# Patient Record
Sex: FEMALE | Race: WHITE | NOT HISPANIC OR LATINO | Employment: OTHER | URBAN - NONMETROPOLITAN AREA
[De-identification: names, ages, dates, MRNs, and addresses within clinical notes are randomized per-mention and may not be internally consistent; named-entity substitution may affect disease eponyms.]

---

## 2017-04-19 ENCOUNTER — TRANSCRIBE ORDERS (OUTPATIENT)
Dept: ADMINISTRATIVE | Facility: HOSPITAL | Age: 72
End: 2017-04-19

## 2017-04-19 DIAGNOSIS — J32.0 CHRONIC MAXILLARY SINUSITIS: Primary | ICD-10-CM

## 2017-04-19 DIAGNOSIS — J30.1 ALLERGIC RHINITIS DUE TO POLLEN, UNSPECIFIED RHINITIS SEASONALITY: ICD-10-CM

## 2017-04-24 ENCOUNTER — HOSPITAL ENCOUNTER (OUTPATIENT)
Dept: CT IMAGING | Facility: HOSPITAL | Age: 72
Discharge: HOME | End: 2017-04-24
Attending: OTOLARYNGOLOGY | Admitting: OTOLARYNGOLOGY

## 2017-04-24 DIAGNOSIS — J30.1 ALLERGIC RHINITIS DUE TO POLLEN, UNSPECIFIED RHINITIS SEASONALITY: ICD-10-CM

## 2017-04-24 DIAGNOSIS — J32.0 CHRONIC MAXILLARY SINUSITIS: ICD-10-CM

## 2017-04-24 PROCEDURE — 70486 CT MAXILLOFACIAL W/O DYE: CPT | Performed by: RADIOLOGY

## 2017-04-24 PROCEDURE — 70486 CT MAXILLOFACIAL W/O DYE: CPT

## 2018-02-01 ENCOUNTER — TRANSCRIBE ORDERS (OUTPATIENT)
Dept: ADMINISTRATIVE | Facility: HOSPITAL | Age: 73
End: 2018-02-01

## 2018-02-01 DIAGNOSIS — M54.5 LOW BACK PAIN, UNSPECIFIED BACK PAIN LATERALITY, UNSPECIFIED CHRONICITY, WITH SCIATICA PRESENCE UNSPECIFIED: Primary | ICD-10-CM

## 2018-02-16 ENCOUNTER — APPOINTMENT (OUTPATIENT)
Dept: MRI IMAGING | Facility: HOSPITAL | Age: 73
End: 2018-02-16

## 2018-02-22 ENCOUNTER — HOSPITAL ENCOUNTER (OUTPATIENT)
Dept: MRI IMAGING | Facility: HOSPITAL | Age: 73
Discharge: HOME | End: 2018-02-22
Admitting: PHYSICIAN ASSISTANT

## 2018-02-22 DIAGNOSIS — M54.5 LOW BACK PAIN, UNSPECIFIED BACK PAIN LATERALITY, UNSPECIFIED CHRONICITY, WITH SCIATICA PRESENCE UNSPECIFIED: ICD-10-CM

## 2018-02-22 PROCEDURE — 72148 MRI LUMBAR SPINE W/O DYE: CPT

## 2018-02-22 PROCEDURE — 72148 MRI LUMBAR SPINE W/O DYE: CPT | Performed by: RADIOLOGY

## 2018-05-17 ENCOUNTER — OFFICE VISIT (OUTPATIENT)
Dept: NEUROSURGERY | Facility: CLINIC | Age: 73
End: 2018-05-17

## 2018-05-17 VITALS
HEART RATE: 82 BPM | TEMPERATURE: 98.5 F | HEIGHT: 66 IN | BODY MASS INDEX: 23.78 KG/M2 | DIASTOLIC BLOOD PRESSURE: 61 MMHG | OXYGEN SATURATION: 98 % | SYSTOLIC BLOOD PRESSURE: 87 MMHG | RESPIRATION RATE: 16 BRPM | WEIGHT: 148 LBS

## 2018-05-17 DIAGNOSIS — M43.16 SPONDYLOLISTHESIS OF LUMBAR REGION: ICD-10-CM

## 2018-05-17 DIAGNOSIS — M50.30 DEGENERATIVE DISC DISEASE, CERVICAL: ICD-10-CM

## 2018-05-17 DIAGNOSIS — M51.36 DEGENERATIVE DISC DISEASE, LUMBAR: Primary | ICD-10-CM

## 2018-05-17 PROCEDURE — 99203 OFFICE O/P NEW LOW 30 MIN: CPT | Performed by: NEUROLOGICAL SURGERY

## 2018-05-17 RX ORDER — LEVOTHYROXINE SODIUM 0.07 MG/1
75 TABLET ORAL DAILY
COMMUNITY

## 2018-05-17 RX ORDER — RANITIDINE 150 MG/1
150 TABLET ORAL 2 TIMES DAILY
COMMUNITY

## 2018-05-17 RX ORDER — METOPROLOL SUCCINATE 25 MG/1
25 TABLET, EXTENDED RELEASE ORAL DAILY
COMMUNITY

## 2018-05-17 RX ORDER — METHOCARBAMOL 750 MG/1
750 TABLET, FILM COATED ORAL NIGHTLY
Qty: 30 TABLET | Refills: 0 | Status: SHIPPED | OUTPATIENT
Start: 2018-05-17 | End: 2018-07-26 | Stop reason: SDUPTHER

## 2018-05-17 RX ORDER — NABUMETONE 750 MG/1
750 TABLET, FILM COATED ORAL 2 TIMES DAILY
Qty: 30 TABLET | Refills: 0 | Status: SHIPPED | OUTPATIENT
Start: 2018-05-17 | End: 2018-07-26 | Stop reason: SDUPTHER

## 2018-05-17 RX ORDER — DULOXETIN HYDROCHLORIDE 60 MG/1
60 CAPSULE, DELAYED RELEASE ORAL DAILY
COMMUNITY

## 2018-05-17 RX ORDER — LEVOCETIRIZINE DIHYDROCHLORIDE 5 MG/1
5 TABLET, FILM COATED ORAL EVERY EVENING
COMMUNITY

## 2018-05-17 RX ORDER — TRAZODONE HYDROCHLORIDE 100 MG/1
100 TABLET ORAL NIGHTLY
COMMUNITY

## 2018-05-17 RX ORDER — MONTELUKAST SODIUM 10 MG/1
10 TABLET ORAL NIGHTLY
COMMUNITY

## 2018-05-17 NOTE — PROGRESS NOTES
Conchita Ross  1945  [id number]      Chief Complaint   Patient presents with   • Back Pain   • Leg Pain       HISTORY OF PRESENT ILLNESS:   This is a 72-year-old female who I saw several years ago with symptoms associated with an acquired spondylolisthesis L4-L5.  She was forwarded to physical therapy and did actually well until recently which time she is had a reoccurrence of her symptoms which occur sporadically yet when they do are quite painful.  She has pain in her back which radiates into both lower extremities.  She has not been to physical therapy recently, however.    Lumbar MRI was performed and she is referred for neurosurgical consideration.    Past Medical History:   Diagnosis Date   • Arthritis    • CHF (congestive heart failure)    • Diabetes mellitus    • Headache    • Hypertension    • Low back pain        Past Surgical History:   Procedure Laterality Date   • CHOLECYSTECTOMY  2012   • HYSTERECTOMY  2003   • SINUS SURGERY  2006       Family History   Problem Relation Age of Onset   • COPD Mother    • Hypertension Father    • Heart disease Father        Social History     Social History   • Marital status:      Spouse name: N/A   • Number of children: N/A   • Years of education: N/A     Occupational History   • Not on file.     Social History Main Topics   • Smoking status: Current Every Day Smoker     Packs/day: 0.25   • Smokeless tobacco: Never Used   • Alcohol use No   • Drug use: No   • Sexual activity: Defer     Other Topics Concern   • Not on file     Social History Narrative   • No narrative on file       No Known Allergies      Current Outpatient Prescriptions:   •  DULoxetine (CYMBALTA) 60 MG capsule, Take 60 mg by mouth Daily., Disp: , Rfl:   •  fluticasone (VERAMYST) 27.5 MCG/SPRAY nasal spray, 2 sprays into each nostril Daily., Disp: , Rfl:   •  levocetirizine (XYZAL) 5 MG tablet, Take 5 mg by mouth Every Evening., Disp: , Rfl:   •  levothyroxine (SYNTHROID, LEVOTHROID) 75  "MCG tablet, Take 75 mcg by mouth Daily., Disp: , Rfl:   •  metFORMIN (GLUCOPHAGE) 500 MG tablet, Take 500 mg by mouth 2 (Two) Times a Day With Meals., Disp: , Rfl:   •  metoprolol succinate XL (TOPROL-XL) 25 MG 24 hr tablet, Take 25 mg by mouth Daily., Disp: , Rfl:   •  montelukast (SINGULAIR) 10 MG tablet, Take 10 mg by mouth Every Night., Disp: , Rfl:   •  raNITIdine (ZANTAC) 150 MG tablet, Take 150 mg by mouth 2 (Two) Times a Day., Disp: , Rfl:   •  traZODone (DESYREL) 100 MG tablet, Take 100 mg by mouth Every Night., Disp: , Rfl:     Review of Systems   Constitutional: Positive for fatigue and unexpected weight change.   HENT: Positive for congestion, facial swelling, hearing loss, sinus pressure, sneezing, tinnitus and voice change.    Eyes: Positive for photophobia.   Respiratory: Positive for shortness of breath and wheezing.    Cardiovascular: Positive for palpitations and leg swelling.   Gastrointestinal: Positive for nausea.   Endocrine: Positive for cold intolerance, heat intolerance, polydipsia and polyuria.   Genitourinary: Positive for flank pain, frequency and pelvic pain.   Musculoskeletal: Positive for arthralgias, back pain, gait problem, myalgias, neck pain and neck stiffness.   Allergic/Immunologic: Positive for environmental allergies.   Neurological: Positive for dizziness, weakness, light-headedness, numbness and headaches.   Hematological: Bruises/bleeds easily.   Psychiatric/Behavioral: Positive for sleep disturbance.   All other systems reviewed and are negative.      Vitals:    05/17/18 0823   BP: (!) 87/61   BP Location: Left arm   Patient Position: Sitting   Pulse: 82   Resp: 16   Temp: 98.5 °F (36.9 °C)   SpO2: 98%   Weight: 67.1 kg (148 lb)   Height: 167.6 cm (66\")       Neurological Examination:  Mental status/speech: The patient is alert and oriented.  Speech is clear without aphysia or dysarthria.  No overt cognitive deficits.    Cranial nerve examination:    Olfaction: Smell is " intact.  Vision: Vision is intact without visual field abnormalities.  Funduscopic examination is normal.  No pupillary irregularity.  Ocular motor examination: The extraocular muscles are intact.  There is no diplopia.  The pupil is round and reactive to both light and accommodation.  There is no nystagmus.  Facial movement/sensation: There is no facial weakness.  Sensation is intact in the first, second, and third divisions of the trigeminal nerve.  The corneal reflex is intact.  Auditory: Hearing is intact to finger rub bilaterally.  Cranial nerves IX, X, XI, XII: Phonation is normal.  No dysphagia.  Tongue is protruded in the midline without atrophy.  The gag reflex is intact.  Shoulder shrug is normal.    Musculoligamentous ligamentous examination: She demonstrates limitation of the range of motion of the lumbar spine without weakness, sensory loss or reflex asymmetry.  Her gait is normal.  There is no Babinski, Christi or clonus.  There is no straight leg raising positive.  Her gait is normal.             Medical Decision Making:     Diagnostic Data Set:  The lumbar MRI data set shows an acquired spondylolisthesis at L4-L5 of grade 1.      Assessment:  Grade 1 spondylolisthesis L4-L5          Recommendations:  I would hope we can manage this conservatively with physical therapy.  I've also provided a prescription of Relafen 750 mg twice a day and Robaxin 750 at night.  She will return to see me in 4 weeks.  At that time, if she is not better PLIF at L4-L5 would be warranted.        I greatly appreciate the opportunity to see and evaluate this individual.  If you have questions or concerns regarding issues that I may have overlooked please call me at any time: [phone].  Jabier Mace M.D.  [facility]  [address] [place] [address].  Formerly [facility]  [id number]

## 2018-07-26 DIAGNOSIS — M50.30 DEGENERATIVE DISC DISEASE, CERVICAL: ICD-10-CM

## 2018-07-26 DIAGNOSIS — M43.16 SPONDYLOLISTHESIS OF LUMBAR REGION: ICD-10-CM

## 2018-07-26 DIAGNOSIS — M51.36 DEGENERATIVE DISC DISEASE, LUMBAR: ICD-10-CM

## 2018-07-26 RX ORDER — METHOCARBAMOL 750 MG/1
TABLET, FILM COATED ORAL
Qty: 30 TABLET | Refills: 0 | Status: SHIPPED | OUTPATIENT
Start: 2018-07-26

## 2018-07-26 RX ORDER — NABUMETONE 750 MG/1
TABLET, FILM COATED ORAL
Qty: 30 TABLET | Refills: 0 | Status: SHIPPED | OUTPATIENT
Start: 2018-07-26 | End: 2018-08-02 | Stop reason: SDUPTHER

## 2018-07-26 NOTE — TELEPHONE ENCOUNTER
Provider:  Rodri  Pharmacy: [facility]  Last visit:   05/17/18  Next visit: 08/02/18    Reason for call:       Automated refill request from patient's pharmacy

## 2018-08-02 ENCOUNTER — OFFICE VISIT (OUTPATIENT)
Dept: NEUROSURGERY | Facility: CLINIC | Age: 73
End: 2018-08-02

## 2018-08-02 VITALS
OXYGEN SATURATION: 100 % | WEIGHT: 150 LBS | HEART RATE: 72 BPM | RESPIRATION RATE: 18 BRPM | HEIGHT: 66 IN | DIASTOLIC BLOOD PRESSURE: 62 MMHG | SYSTOLIC BLOOD PRESSURE: 89 MMHG | BODY MASS INDEX: 24.11 KG/M2 | TEMPERATURE: 98.6 F

## 2018-08-02 DIAGNOSIS — M51.36 DEGENERATIVE DISC DISEASE, LUMBAR: Primary | ICD-10-CM

## 2018-08-02 DIAGNOSIS — M50.30 DEGENERATIVE DISC DISEASE, CERVICAL: ICD-10-CM

## 2018-08-02 DIAGNOSIS — M43.16 SPONDYLOLISTHESIS OF LUMBAR REGION: ICD-10-CM

## 2018-08-02 PROCEDURE — 99213 OFFICE O/P EST LOW 20 MIN: CPT | Performed by: NEUROLOGICAL SURGERY

## 2018-08-02 RX ORDER — NABUMETONE 750 MG/1
750 TABLET, FILM COATED ORAL 2 TIMES DAILY
Qty: 60 TABLET | Refills: 2 | Status: SHIPPED | OUTPATIENT
Start: 2018-08-02

## 2018-08-02 NOTE — PATIENT INSTRUCTIONS
call Dr. Mace on a Monday or Tuesday with an update.   Ask for  Tiffanie  and leave a message for  Dr. Mace.  He will call you back at the end of the day as soon as he can.     [phone]    Call one month

## 2018-08-02 NOTE — PROGRESS NOTES
"Conchita Ross  1945  [id number]                       CURRENT WORKING DIAGNOSIS:  Acquired spondylolisthesis L4-L5          MEDICAL HISTORY SINCE LAST ENCOUNTER:  She has shown some improvement since her last encounter yet not completely asymptomatic.  Physical therapy and her medications have been of benefit.  She has less claudication yet still has back pain.  She continues to have \"good and bad days\".  She is seen for follow-up.            Past Medical History:   Diagnosis Date   • Arthritis    • CHF (congestive heart failure) (CMS/HCC)    • Diabetes mellitus (CMS/HCC)    • Headache    • Hypertension    • Low back pain               Past Surgical History:   Procedure Laterality Date   • CHOLECYSTECTOMY  2012   • HYSTERECTOMY  2003   • SINUS SURGERY  2006              Family History   Problem Relation Age of Onset   • COPD Mother    • Hypertension Father    • Heart disease Father               Social History     Social History   • Marital status:      Spouse name: N/A   • Number of children: N/A   • Years of education: N/A     Occupational History   • Not on file.     Social History Main Topics   • Smoking status: Current Every Day Smoker     Packs/day: 0.25   • Smokeless tobacco: Never Used   • Alcohol use No   • Drug use: No   • Sexual activity: Defer     Other Topics Concern   • Not on file     Social History Narrative   • No narrative on file            No Known Allergies           Current Outpatient Prescriptions:   •  DULoxetine (CYMBALTA) 60 MG capsule, Take 60 mg by mouth Daily., Disp: , Rfl:   •  fluticasone (VERAMYST) 27.5 MCG/SPRAY nasal spray, 2 sprays into each nostril Daily., Disp: , Rfl:   •  levocetirizine (XYZAL) 5 MG tablet, Take 5 mg by mouth Every Evening., Disp: , Rfl:   •  levothyroxine (SYNTHROID, LEVOTHROID) 75 MCG tablet, Take 75 mcg by mouth Daily., Disp: , Rfl:   •  metFORMIN (GLUCOPHAGE) 500 MG tablet, Take 500 mg by mouth 2 (Two) Times a Day With Meals., Disp: , Rfl:   •  " "methocarbamol (ROBAXIN) 750 MG tablet, TAKE ONE TABLET BY MOUTH EVERY NIGHT, Disp: 30 tablet, Rfl: 0  •  metoprolol succinate XL (TOPROL-XL) 25 MG 24 hr tablet, Take 25 mg by mouth Daily., Disp: , Rfl:   •  montelukast (SINGULAIR) 10 MG tablet, Take 10 mg by mouth Every Night., Disp: , Rfl:   •  nabumetone (RELAFEN) 750 MG tablet, TAKE ONE TABLET BY MOUTH TWO TIMES A DAY, Disp: 30 tablet, Rfl: 0  •  raNITIdine (ZANTAC) 150 MG tablet, Take 150 mg by mouth 2 (Two) Times a Day., Disp: , Rfl:   •  traZODone (DESYREL) 100 MG tablet, Take 100 mg by mouth Every Night., Disp: , Rfl:          Review of Systems   HENT: Positive for congestion, hearing loss, sinus pressure and tinnitus.    Eyes: Positive for photophobia.   Respiratory: Positive for wheezing.    Cardiovascular: Positive for palpitations.   Endocrine: Positive for cold intolerance, heat intolerance, polydipsia and polyuria.   Genitourinary: Positive for frequency.   Musculoskeletal: Positive for arthralgias, back pain, gait problem, myalgias, neck pain and neck stiffness.   Allergic/Immunologic: Positive for environmental allergies.   Neurological: Positive for dizziness, weakness, light-headedness and numbness.   Hematological: Bruises/bleeds easily.   Psychiatric/Behavioral: Positive for sleep disturbance.   All other systems reviewed and are negative.              Vitals:    08/02/18 1212   BP: (!) 89/62   BP Location: Left arm   Patient Position: Sitting   Pulse: 72   Resp: 18   Temp: 98.6 °F (37 °C)   SpO2: 100%   Weight: 68 kg (150 lb)   Height: 167.6 cm (66\")               EXAMINATION: She has limitation in the range of motion of lumbar spine.  However, straight leg raising, [place] and flip test are negative.  I find no evidence of weakness, sensory loss or reflex asymmetry.  Her gait is normal.            MEDICAL DECISION MAKING: She states that she is better and wishes to defer any surgical intervention.           ASSESSMENT/DISPOSITION: I have renewed " "her medicines of Relafen 750 mg twice a day and will ask her to call me in 1 month.  His long as she is doing \"okay\" surgical intervention is not a consideration.  PLIF should be reserved until such time as she is \"ready\".  I will keep you informed.              I APPRECIATE THE OPPORTUNITY OF THIS REFERRAL. PLEASE CALL IF ANY       QUESTIONS [phone]    "

## 2018-12-27 DIAGNOSIS — R06.02 SOB (SHORTNESS OF BREATH): Primary | ICD-10-CM

## 2019-06-12 ENCOUNTER — OFFICE VISIT (OUTPATIENT)
Dept: PULMONOLOGY | Facility: CLINIC | Age: 74
End: 2019-06-12

## 2019-06-12 VITALS
HEART RATE: 76 BPM | DIASTOLIC BLOOD PRESSURE: 54 MMHG | HEIGHT: 65 IN | WEIGHT: 150 LBS | TEMPERATURE: 97.9 F | SYSTOLIC BLOOD PRESSURE: 115 MMHG | BODY MASS INDEX: 24.99 KG/M2 | OXYGEN SATURATION: 95 %

## 2019-06-12 DIAGNOSIS — G47.33 OSA (OBSTRUCTIVE SLEEP APNEA): ICD-10-CM

## 2019-06-12 DIAGNOSIS — R06.02 SOB (SHORTNESS OF BREATH) ON EXERTION: Primary | ICD-10-CM

## 2019-06-12 DIAGNOSIS — F17.200 CURRENT SMOKER: ICD-10-CM

## 2019-06-12 DIAGNOSIS — G47.34 NOCTURNAL OXYGEN DESATURATION: ICD-10-CM

## 2019-06-12 DIAGNOSIS — J43.2 CENTRILOBULAR EMPHYSEMA (HCC): ICD-10-CM

## 2019-06-12 PROCEDURE — 94664 DEMO&/EVAL PT USE INHALER: CPT | Performed by: INTERNAL MEDICINE

## 2019-06-12 PROCEDURE — 99407 BEHAV CHNG SMOKING > 10 MIN: CPT | Performed by: INTERNAL MEDICINE

## 2019-06-12 PROCEDURE — 99204 OFFICE O/P NEW MOD 45 MIN: CPT | Performed by: INTERNAL MEDICINE

## 2019-06-12 PROCEDURE — 94618 PULMONARY STRESS TESTING: CPT | Performed by: INTERNAL MEDICINE

## 2019-06-12 RX ORDER — GLIMEPIRIDE 1 MG/1
1 TABLET ORAL
COMMUNITY

## 2019-06-12 NOTE — PROGRESS NOTES
Subjective    Conchita Ross presents for the following COPD  .    History of Present Illness   Jael is a 73-year-old female with a past medical history of arthritis, congestive heart failure, diabetes, hypertension and low back pain.  She presents to pulmonary outpatient clinic for the evaluation and management of gradual worsening of shortness of breath from last 3 months.  She started feeling shortness of breath from last 3 months.  Her shortness of breath started gradually.  Symptoms of shortness of breath are intermittent in nature.  Shortness of breath is not associated with chest pain.  Shortness of breath is associated with intermittent wheezing and productive cough from last 2 months.  She denies any subjective fever or chills.  Symptoms of shortness of breath increases with activity and decreases on rest.  She denies any discomfort when laying down flat.  She denies any severe attack of shortness of breath and coughing at nighttime.  She denies any night sweats or weight loss.  She denies any history of blood clot in the leg or in the lung.  Her cough is productive.  She produces 3 to 4 tablespoon of sputum over 24 hours.  Sputum color is white and has mucoid consistency.  Patient denies any blood in the sputum.  Wheezing happens mostly during the nighttime.  She is currently an active smoker.  She is currently on Combivent inhaler and Brio Ellipta inhaler.  She does complain of morning headache and excessive daytime sleepiness with unrefreshed sleep.  She was told by family member that she snores while sleeping.    Review of system  General ROS: negative for - chills, fatigue, fever, malaise, night sweats.   Psychological ROS: negative for - anxiety, behavioral disorder, depression or memory difficulties  Ophthalmic ROS: negative for - blurry vision, decreased vision, double vision or dry eyes  ENT ROS: negative for - epistaxis, hearing change or sneezing  Allergy and Immunology ROS: negative for -  hives, itchy/watery eyes or nasal congestion  Hematological and Lymphatic ROS: negative for - bleeding problems, blood clots, blood transfusions, jaundice or night sweats  Endocrine ROS: negative for - malaise/lethargy, mood swings, polydipsia/polyuria or temperature intolerance  Respiratory ROS: positive for shortness of breath on exertion.  positive for cough and wheezing  negative for - orthopnea, pleuritic pain or sputum changes  Cardiovascular ROS: no chest pain.  Positive for dyspnea on exertion  Gastrointestinal ROS: no abdominal pain, change in bowel habits, or black or bloody stools  Musculoskeletal ROS: negative for - joint pain, joint stiffness or joint swelling  Neurological ROS: no TIA or stroke symptoms  Dermatological ROS: negative for acne, dry skin and eczema      Past Medical History:  Past Medical History:   Diagnosis Date   • Arthritis    • CHF (congestive heart failure) (CMS/[facility])    • Diabetes mellitus (CMS/[facility])    • Headache    • Hypertension    • Low back pain        Family History:  Family History   Problem Relation Age of Onset   • COPD Mother    • Hypertension Father    • Heart disease Father        Social History:  Social History     Tobacco Use   • Smoking status: Current Every Day Smoker     Packs/day: 0.25   • Smokeless tobacco: Never Used   Substance Use Topics   • Alcohol use: No       Current Medications:  Current Outpatient Medications   Medication Sig Dispense Refill   • DULoxetine (CYMBALTA) 60 MG capsule Take 60 mg by mouth Daily.     • fluticasone (VERAMYST) 27.5 MCG/SPRAY nasal spray 2 sprays into each nostril Daily.     • Fluticasone Furoate-Vilanterol (BREO ELLIPTA) 100-25 MCG/INH inhaler Inhale 1 puff Daily.     • glimepiride (AMARYL) 1 MG tablet Take 1 mg by mouth Every Morning Before Breakfast.     • ipratropium-albuterol (COMBIVENT RESPIMAT)  MCG/ACT inhaler Inhale 1 puff 4 (Four) Times a Day As Needed for Wheezing.     • levocetirizine (XYZAL) 5 MG tablet Take 5 mg  "by mouth Every Evening.     • levothyroxine (SYNTHROID, LEVOTHROID) 75 MCG tablet Take 75 mcg by mouth Daily.     • metFORMIN (GLUCOPHAGE) 500 MG tablet Take 500 mg by mouth 2 (Two) Times a Day With Meals.     • metoprolol succinate XL (TOPROL-XL) 25 MG 24 hr tablet Take 25 mg by mouth Daily.     • montelukast (SINGULAIR) 10 MG tablet Take 10 mg by mouth Every Night.     • raNITIdine (ZANTAC) 150 MG tablet Take 150 mg by mouth 2 (Two) Times a Day.     • traZODone (DESYREL) 100 MG tablet Take 100 mg by mouth Every Night.     • methocarbamol (ROBAXIN) 750 MG tablet TAKE ONE TABLET BY MOUTH EVERY NIGHT 30 tablet 0   • nabumetone (RELAFEN) 750 MG tablet Take 1 tablet by mouth 2 (Two) Times a Day. 60 tablet 2   • tiotropium (SPIRIVA) 18 MCG per inhalation capsule Place 1 capsule into inhaler and inhale Daily. 30 capsule 11     No current facility-administered medications for this visit.        Allergies:  No Known Allergies    Vitals:  /54   Pulse 76   Temp 97.9 °F (36.6 °C) (Oral)   Ht 165.1 cm (65\")   Wt 68 kg (150 lb)   SpO2 95%   BMI 24.96 kg/m²       Objective   Physical Exam:  Constitutional:  oriented to person, place, and time. appears well-developed and well-nourished. No distress.   HENT:   Head: Normocephalic and atraumatic.   Right Ear: External ear normal.   Left Ear: External ear normal.   Nose: Nose normal.   Eyes: Pupils are equal, round, and reactive to light. Conjunctivae and EOM are normal. Right eye exhibits no discharge. Left eye exhibits no discharge. No scleral icterus.   Neck: Normal range of motion. Neck supple. No thyromegaly present.   Cardiovascular: Normal rate, regular rhythm, normal heart sounds and intact distal pulses.  Exam reveals no friction rub.    No murmur heard.  Pulmonary/Chest: No stridor.   Bilateral air entry equal.  Decreased breath sounds in left and right posterior lower lung zones.  No rhonchi heard.  Wheezing absent.  No crackles appreciated.    Abdominal: " Soft. Bowel sounds are normal.exhibits no distension. There is no tenderness.   Musculoskeletal: Normal range of motion. exhibits no deformity.   no lower extremity edema bilateral.   Neurological: alert and oriented to person, place, and time. No cranial nerve deficit. Coordination normal.   Skin: Skin is warm and dry. Capillary refill takes less than 2 seconds. not diaphoretic. No erythema.   Psychiatric:   normal mood and affect.   behavior is normal.         I have reviewed the past medical history, past surgical history, family history and social history of the patient.     Labs:      Imaging        Assessment/Plan    SOB (shortness of breath) on exertion  - ordered CXR   - ordered PFT   - patient underwent walk oximetry while in clinic.         Nocturnal oxygen desaturation    - continue supplemental oxygen while sleeping to avoid nocturnal desaturation.       Current smoker      - Smoking cessation counseling performed    IGOR (obstructive sleep apnea)   - ordered sleep study referral        Centrilobular emphysema (CMS/HCC)   - On breo-ellipta inhaler  - on albuterol-ipratropium inhaler  - Ordered Spiriva inhalation  - ordered PFT          Walk oximetry during clinic visit  Patient underwent walk oximetry during clinic visit.  The results will be scanned in epic.    Smoking cessation counseling:  I extensively discussed consequences of smoking namely cardiovascular/metabolic, lung cancer, mouth cancer, pulmonary disorder including COPD and reduced quality of life.  At the end of the conversation, patient voices understanding of the risk involved in smoking.  Patient also understands the benefits of quitting.  I provided the patient smoking cessation material. Patient displayed understanding and stated that she will try to quit smoking.  During this visit I spent 11 minutes counseling the patient regarding the smoking cessation.  I advised Conchita of the risks of continuing to use tobacco, and I provided her with  tobacco cessation educational materials in the After Visit Summary.     During this visit, I spent 11 minutes counseling the patient regarding tobacco cessation.      Inhaler technique demonstration/discussion:  I have extensively discussed the steps.  In summary, the steps were discussed in the following order. Patient was advised to rinse the mouth after steroid inhalation to prevent fungal mucositis.  · Remove the cap from the inhaler and shake well.    · Breathe out all the way.    · Place the mouthpiece of the inhaler between your teeth and seal your lips tightly around it.    · As you start to breath in slowly, press down on the canister one time.   · Keep breathing in as slowly and deeply as you can.    · It should take about 5 seconds for you to completely breathe in.    · Hold your breath for 10 seconds(count to 10 slowly) to allow the medication to reach the airways of the lung.    · Repeat the above steps for each puff.    · Wait about 1 minute between the puffs.    · Replace the cap on the inhaler when finished.    Follow up in 6 months and as needed.

## 2019-06-17 ENCOUNTER — HOSPITAL ENCOUNTER (OUTPATIENT)
Dept: GENERAL RADIOLOGY | Facility: HOSPITAL | Age: 74
Discharge: HOME | End: 2019-06-17
Admitting: INTERNAL MEDICINE

## 2019-06-17 DIAGNOSIS — R06.02 SOB (SHORTNESS OF BREATH) ON EXERTION: ICD-10-CM

## 2019-06-17 PROCEDURE — 71046 X-RAY EXAM CHEST 2 VIEWS: CPT | Performed by: RADIOLOGY

## 2019-06-17 PROCEDURE — 71046 X-RAY EXAM CHEST 2 VIEWS: CPT

## 2019-07-23 ENCOUNTER — HOSPITAL ENCOUNTER (OUTPATIENT)
Dept: RESPIRATORY THERAPY | Facility: HOSPITAL | Age: 74
Discharge: HOME | End: 2019-07-23
Admitting: INTERNAL MEDICINE

## 2019-07-23 DIAGNOSIS — F17.200 CURRENT SMOKER: ICD-10-CM

## 2019-07-23 DIAGNOSIS — J43.2 CENTRILOBULAR EMPHYSEMA (HCC): ICD-10-CM

## 2019-07-23 DIAGNOSIS — R06.02 SOB (SHORTNESS OF BREATH) ON EXERTION: ICD-10-CM

## 2019-07-23 PROCEDURE — 94726 PLETHYSMOGRAPHY LUNG VOLUMES: CPT

## 2019-07-23 PROCEDURE — 94729 DIFFUSING CAPACITY: CPT

## 2019-07-23 PROCEDURE — 94729 DIFFUSING CAPACITY: CPT | Performed by: INTERNAL MEDICINE

## 2019-07-23 PROCEDURE — 94640 AIRWAY INHALATION TREATMENT: CPT

## 2019-07-23 PROCEDURE — 94060 EVALUATION OF WHEEZING: CPT | Performed by: INTERNAL MEDICINE

## 2019-07-23 PROCEDURE — 94727 GAS DIL/WSHOT DETER LNG VOL: CPT | Performed by: INTERNAL MEDICINE

## 2019-07-23 PROCEDURE — 94060 EVALUATION OF WHEEZING: CPT

## 2019-07-23 RX ORDER — ALBUTEROL SULFATE 2.5 MG/3ML
2.5 SOLUTION RESPIRATORY (INHALATION) ONCE
Status: COMPLETED | OUTPATIENT
Start: 2019-07-23 | End: 2019-07-23

## 2019-07-23 RX ADMIN — ALBUTEROL SULFATE 2.5 MG: 2.5 SOLUTION RESPIRATORY (INHALATION) at 13:16

## 2019-10-29 ENCOUNTER — TELEPHONE (OUTPATIENT)
Dept: PULMONOLOGY | Facility: CLINIC | Age: 74
End: 2019-10-29

## 2019-10-29 NOTE — TELEPHONE ENCOUNTER
Kev Herman MD [place], Casey Rebekah, MA             She has moderate COPD.    Previous Messages      ----- Message -----   From: Tong Sheldon MA   Sent: 10/23/2019   1:11 PM   To: Kev Herman MD     Pt had pft done back in July and didn't hear anything about results.   Thank you

## 2021-02-17 DIAGNOSIS — Z23 IMMUNIZATION DUE: ICD-10-CM

## 2022-07-12 ENCOUNTER — HOSPITAL ENCOUNTER (OUTPATIENT)
Dept: HOSPITAL 79 - OR | Age: 77
Discharge: HOME | End: 2022-07-12
Attending: INTERNAL MEDICINE
Payer: MEDICARE

## 2022-07-12 DIAGNOSIS — K72.90: ICD-10-CM

## 2022-07-12 DIAGNOSIS — I10: ICD-10-CM

## 2022-07-12 DIAGNOSIS — K76.6: ICD-10-CM

## 2022-07-12 DIAGNOSIS — Z98.890: ICD-10-CM

## 2022-07-12 DIAGNOSIS — K31.89: ICD-10-CM

## 2022-07-12 DIAGNOSIS — I85.10: ICD-10-CM

## 2022-07-12 DIAGNOSIS — E11.9: ICD-10-CM

## 2022-07-12 DIAGNOSIS — Z88.1: ICD-10-CM

## 2022-07-12 DIAGNOSIS — K74.69: Primary | ICD-10-CM

## 2024-07-22 ENCOUNTER — OFFICE VISIT (OUTPATIENT)
Dept: SURGERY | Facility: CLINIC | Age: 79
End: 2024-07-22
Payer: MEDICARE

## 2024-07-22 ENCOUNTER — CONSULT (OUTPATIENT)
Dept: ONCOLOGY | Facility: CLINIC | Age: 79
End: 2024-07-22
Payer: MEDICARE

## 2024-07-22 ENCOUNTER — LAB (OUTPATIENT)
Dept: ONCOLOGY | Facility: CLINIC | Age: 79
End: 2024-07-22
Payer: MEDICARE

## 2024-07-22 VITALS
RESPIRATION RATE: 20 BRPM | DIASTOLIC BLOOD PRESSURE: 76 MMHG | TEMPERATURE: 98 F | OXYGEN SATURATION: 93 % | SYSTOLIC BLOOD PRESSURE: 111 MMHG | HEART RATE: 112 BPM | HEIGHT: 66 IN | BODY MASS INDEX: 20.18 KG/M2 | WEIGHT: 125.6 LBS

## 2024-07-22 VITALS
HEIGHT: 66 IN | SYSTOLIC BLOOD PRESSURE: 111 MMHG | BODY MASS INDEX: 20.09 KG/M2 | DIASTOLIC BLOOD PRESSURE: 76 MMHG | WEIGHT: 125 LBS

## 2024-07-22 DIAGNOSIS — C34.90 MALIGNANT NEOPLASM OF UNSPECIFIED PART OF UNSPECIFIED BRONCHUS OR LUNG: Primary | ICD-10-CM

## 2024-07-22 DIAGNOSIS — K75.81 NASH (NONALCOHOLIC STEATOHEPATITIS): ICD-10-CM

## 2024-07-22 DIAGNOSIS — C34.90 MALIGNANT NEOPLASM OF UNSPECIFIED PART OF UNSPECIFIED BRONCHUS OR LUNG: ICD-10-CM

## 2024-07-22 DIAGNOSIS — C34.92 NON-SMALL CELL CANCER OF LEFT LUNG: ICD-10-CM

## 2024-07-22 DIAGNOSIS — C34.92 NON-SMALL CELL CANCER OF LEFT LUNG: Primary | ICD-10-CM

## 2024-07-22 DIAGNOSIS — R42 DIZZINESS: ICD-10-CM

## 2024-07-22 LAB
ALBUMIN SERPL-MCNC: 3.3 G/DL (ref 3.5–5.2)
ALBUMIN/GLOB SERPL: 0.8 G/DL
ALP SERPL-CCNC: 201 U/L (ref 39–117)
ALT SERPL W P-5'-P-CCNC: 14 U/L (ref 1–33)
ANION GAP SERPL CALCULATED.3IONS-SCNC: 11.9 MMOL/L (ref 5–15)
AST SERPL-CCNC: 32 U/L (ref 1–32)
BASOPHILS # BLD AUTO: 0.04 10*3/MM3 (ref 0–0.2)
BASOPHILS NFR BLD AUTO: 0.6 % (ref 0–1.5)
BILIRUB SERPL-MCNC: 1.6 MG/DL (ref 0–1.2)
BUN SERPL-MCNC: 11 MG/DL (ref 8–23)
BUN/CREAT SERPL: 17.5 (ref 7–25)
CALCIUM SPEC-SCNC: 8.9 MG/DL (ref 8.6–10.5)
CHLORIDE SERPL-SCNC: 98 MMOL/L (ref 98–107)
CO2 SERPL-SCNC: 26.1 MMOL/L (ref 22–29)
CREAT SERPL-MCNC: 0.63 MG/DL (ref 0.57–1)
DEPRECATED RDW RBC AUTO: 50.4 FL (ref 37–54)
EGFRCR SERPLBLD CKD-EPI 2021: 91.5 ML/MIN/1.73
EOSINOPHIL # BLD AUTO: 0.14 10*3/MM3 (ref 0–0.4)
EOSINOPHIL NFR BLD AUTO: 2.2 % (ref 0.3–6.2)
ERYTHROCYTE [DISTWIDTH] IN BLOOD BY AUTOMATED COUNT: 15.4 % (ref 12.3–15.4)
GLOBULIN UR ELPH-MCNC: 4 GM/DL
GLUCOSE SERPL-MCNC: 129 MG/DL (ref 65–99)
HCT VFR BLD AUTO: 33.3 % (ref 34–46.6)
HGB BLD-MCNC: 10.2 G/DL (ref 12–15.9)
IMM GRANULOCYTES # BLD AUTO: 0.02 10*3/MM3 (ref 0–0.05)
IMM GRANULOCYTES NFR BLD AUTO: 0.3 % (ref 0–0.5)
INR PPP: 1.19 (ref 0.9–1.1)
LYMPHOCYTES # BLD AUTO: 0.78 10*3/MM3 (ref 0.7–3.1)
LYMPHOCYTES NFR BLD AUTO: 12.2 % (ref 19.6–45.3)
MCH RBC QN AUTO: 27.5 PG (ref 26.6–33)
MCHC RBC AUTO-ENTMCNC: 30.6 G/DL (ref 31.5–35.7)
MCV RBC AUTO: 89.8 FL (ref 79–97)
MONOCYTES # BLD AUTO: 0.7 10*3/MM3 (ref 0.1–0.9)
MONOCYTES NFR BLD AUTO: 11 % (ref 5–12)
NEUTROPHILS NFR BLD AUTO: 4.7 10*3/MM3 (ref 1.7–7)
NEUTROPHILS NFR BLD AUTO: 73.7 % (ref 42.7–76)
NRBC BLD AUTO-RTO: 0 /100 WBC (ref 0–0.2)
PLATELET # BLD AUTO: 187 10*3/MM3 (ref 140–450)
PMV BLD AUTO: 10.1 FL (ref 6–12)
POTASSIUM SERPL-SCNC: 3.3 MMOL/L (ref 3.5–5.2)
PROT SERPL-MCNC: 7.3 G/DL (ref 6–8.5)
PROTHROMBIN TIME: 15.3 SECONDS (ref 12.1–14.7)
RBC # BLD AUTO: 3.71 10*6/MM3 (ref 3.77–5.28)
SODIUM SERPL-SCNC: 136 MMOL/L (ref 136–145)
WBC NRBC COR # BLD AUTO: 6.38 10*3/MM3 (ref 3.4–10.8)

## 2024-07-22 PROCEDURE — 85610 PROTHROMBIN TIME: CPT

## 2024-07-22 PROCEDURE — 1159F MED LIST DOCD IN RCRD: CPT

## 2024-07-22 PROCEDURE — 99203 OFFICE O/P NEW LOW 30 MIN: CPT

## 2024-07-22 PROCEDURE — 1126F AMNT PAIN NOTED NONE PRSNT: CPT | Performed by: INTERNAL MEDICINE

## 2024-07-22 PROCEDURE — 85025 COMPLETE CBC W/AUTO DIFF WBC: CPT | Performed by: INTERNAL MEDICINE

## 2024-07-22 PROCEDURE — 80053 COMPREHEN METABOLIC PANEL: CPT | Performed by: INTERNAL MEDICINE

## 2024-07-22 PROCEDURE — 99205 OFFICE O/P NEW HI 60 MIN: CPT | Performed by: INTERNAL MEDICINE

## 2024-07-22 PROCEDURE — 1160F RVW MEDS BY RX/DR IN RCRD: CPT

## 2024-07-22 RX ORDER — INSULIN DEGLUDEC 100 U/ML
15 INJECTION, SOLUTION SUBCUTANEOUS ONCE
COMMUNITY
Start: 2024-06-24

## 2024-07-22 RX ORDER — DOXEPIN HYDROCHLORIDE 50 MG/1
50 CAPSULE ORAL DAILY
COMMUNITY
Start: 2024-04-29

## 2024-07-22 RX ORDER — LACTULOSE 10 G/15ML
30 SOLUTION ORAL 3 TIMES DAILY
COMMUNITY
Start: 2024-06-24

## 2024-07-22 RX ORDER — FUROSEMIDE 20 MG/1
20 TABLET ORAL DAILY
COMMUNITY
Start: 2024-06-24

## 2024-07-22 NOTE — PROGRESS NOTES
Subjective     Date: 7/21/2024    Referring Provider  Zafar Rodriguez MD    Chief Complaint  No chief complaint on file.    Subjective     {Problem List  Visit Diagnosis   Encounters  Notes  Medications  Labs  Result Review Imaging  Media :23}     Conchita Ross is a 78 y.o. female who presents today to [facility] HEMATOLOGY & ONCOLOGY for {Blank multiple:70492}.    HPI:   History of Present Illness    The following portions of the patient's history were reviewed and updated as appropriate: allergies, current medications, past family history, past medical history, past social history, past surgical history and problem list.    Objective     Objective     Allergy:   No Known Allergies     Current Medications:   Current Outpatient Medications   Medication Sig Dispense Refill    DULoxetine (CYMBALTA) 60 MG capsule Take 60 mg by mouth Daily.      fluticasone (VERAMYST) 27.5 MCG/SPRAY nasal spray 2 sprays into each nostril Daily.      Fluticasone Furoate-Vilanterol (BREO ELLIPTA) 100-25 MCG/INH inhaler Inhale 1 puff Daily.      glimepiride (AMARYL) 1 MG tablet Take 1 mg by mouth Every Morning Before Breakfast.      ipratropium-albuterol (COMBIVENT RESPIMAT)  MCG/ACT inhaler Inhale 1 puff 4 (Four) Times a Day As Needed for Wheezing.      levocetirizine (XYZAL) 5 MG tablet Take 5 mg by mouth Every Evening.      levothyroxine (SYNTHROID, LEVOTHROID) 75 MCG tablet Take 75 mcg by mouth Daily.      metFORMIN (GLUCOPHAGE) 500 MG tablet Take 500 mg by mouth 2 (Two) Times a Day With Meals.      methocarbamol (ROBAXIN) 750 MG tablet TAKE ONE TABLET BY MOUTH EVERY NIGHT 30 tablet 0    metoprolol succinate XL (TOPROL-XL) 25 MG 24 hr tablet Take 25 mg by mouth Daily.      montelukast (SINGULAIR) 10 MG tablet Take 10 mg by mouth Every Night.      nabumetone (RELAFEN) 750 MG tablet Take 1 tablet by mouth 2 (Two) Times a Day. 60 tablet 2    raNITIdine (ZANTAC) 150 MG tablet Take 150 mg by mouth  "2 (Two) Times a Day.      tiotropium (SPIRIVA) 18 MCG per inhalation capsule Place 1 capsule into inhaler and inhale Daily. 30 capsule 11    traZODone (DESYREL) 100 MG tablet Take 100 mg by mouth Every Night.       No current facility-administered medications for this visit.       Past Medical History:  Past Medical History:   Diagnosis Date    Arthritis     CHF (congestive heart failure)     Diabetes mellitus     Headache     Hypertension     Low back pain        Past Surgical History:  Past Surgical History:   Procedure Laterality Date    CHOLECYSTECTOMY  2012    HYSTERECTOMY  2003    SINUS SURGERY  2006       Social History:  Social History     Socioeconomic History    Marital status:    Tobacco Use    Smoking status: Every Day     Current packs/day: 0.25     Types: Cigarettes    Smokeless tobacco: Never   Substance and Sexual Activity    Alcohol use: No    Drug use: No    Sexual activity: Defer     {Cessation (Optional):28707}    Family History:  Family History   Problem Relation Age of Onset    COPD Mother     Hypertension Father     Heart disease Father        Review of Systems:  Review of Systems    Vital Signs:   There were no vitals taken for this visit.     Physical Exam:  Physical Exam    PHQ-9 Score  PHQ-9 Total Score:       Pain Score  There were no vitals filed for this visit.                {PAINSCOREQUALITYMETRIC (Optional):93237}    Lab Review  No results found for: \"WBC\", \"HGB\", \"HCT\", \"MCV\", \"RDW\", \"PLT\", \"NEUTRORELPCT\", \"LYMPHORELPCT\", \"MONORELPCT\", \"EOSRELPCT\", \"BASORELPCT\", \"NEUTROABS\", \"LYMPHSABS\"    No results found for: \"NA\", \"K\", \"CO2\", \"CL\", \"BUN\", \"CREATININE\", \"EGFRIFNONA\", \"EGFRIFAFRI\", \"GLUCOSE\", \"CALCIUM\", \"ALKPHOS\", \"AST\", \"ALT\", \"BILITOT\", \"ALBUMIN\", \"PROTEINTOT\", \"MG\", \"PHOS\"    Radiology Results  No Images in the past 120 days found..       Pathology: ***    Endoscopy: ***    Assessment / Plan         Assessment and Plan   Conchita Ross is a 78 y.o. year old with history " of *** presents for ***        Discussed possible differential diagnoses, testing, treatment, recommended non-pharmacological interventions, risks, warning signs to monitor for that would indicate need for follow-up in clinic or ER. If no improvement with these regimens or you have new or worsening symptoms follow-up. Patient verbalizes understanding and agreement with plan of care. Denies further needs or concerns.     Patient was given instructions and counseling regarding her condition and for health maintenance advised.    {2021TIMESPENTOPTIONAL (Optional):94836}   All questions were answered to her satisfaction.    BMI cannot be calculated due to outdated height or weight values.  Please input a current height/weight in Vitals and re-renter BMIFOLLOWUP in Note to pull in correct documentation based on BMI range.         Meds ordered during this visit  No orders of the defined types were placed in this encounter.      Visit Diagnoses  No diagnosis found.    Follow Up   No follow-ups on file.        This document has been electronically signed by Rosenda Cassidy MD   July 21, 2024 21:22 EDT    Dictated Utilizing Dragon Dictation: Part of this note may be an electronic transcription/translation of spoken language to printed text using the Dragon Dictation System.

## 2024-07-22 NOTE — PROGRESS NOTES
"      Subjective     Date: 7/22/2024    Referring Provider  Zafar Rodriguez MD    Chief Complaint  Denovo metastatic non small cell lung cancer, squamous     Subjective          Conchita Ross is a 78 y.o. female who presents today to [facility] HEMATOLOGY & ONCOLOGY for initial evaluation .    HPI:   78 y.o. female with past medical history of cirrhosis complicated by non bleeding esophageal varices, diabetes mellitus type 2, COPD on night time oxygen who presents due to new diagnosis of metastatic non small cell lung cancer.     Oncology history:  12/2023: Reports cough and weakness. Treated multiple times with antibiotics and ora steroids with brief relief.   5/2024: Worsened cough   6/2024: Presented to [facility] due to hemoptysis. CT chest with noted L hilar mass. Underwent bronchoscopy inpatient 6/12/2024 by Dr. Rodriguez. Bronchial wash left lobe with blood and debris, fragments of squamous carcinoma. Biopsy of left mainstem bronchus negative for malignancy.   7/10/2024: PET/CT with 5 x 3 cm left lower lobe mass abutting the hilum, and 8 mm right upper lobe spiculated nodule likely representing second primary malignancy vs metastatic.     Ms. Ross presents today with her son, Andrés. She reports fatigue, weakness, cough since November 2023. Has associated ~25 lbs weight loss. She lives alone, son lives 10 minutes away. Able to perform ADLs, reports persistent cough with deep breaths and intermittent \"mild\" hemoptysis.    She is a previous smoker, quit 10  years ago, smoked 1 ppd X 40 years. Denies alcohol or drug use.      The following portions of the patient's history were reviewed and updated as appropriate: allergies, current medications, past family history, past medical history, past social history, past surgical history and problem list.    Objective     Objective     Allergy:   Allergies   Allergen Reactions    Ciprofloxacin Nausea Only        Current Medications: "   Current Outpatient Medications   Medication Sig Dispense Refill    doxepin (SINEquan) 50 MG capsule Take 1 capsule by mouth Daily.      furosemide (LASIX) 20 MG tablet Take 1 tablet by mouth Daily.      lactulose (CHRONULAC) 10 GM/15ML solution Take 45 mL by mouth 3 (Three) Times a Day.      levocetirizine (XYZAL) 5 MG tablet Take 1 tablet by mouth Every Evening.      levothyroxine (SYNTHROID, LEVOTHROID) 75 MCG tablet Take 1 tablet by mouth Daily.      tiotropium (SPIRIVA) 18 MCG per inhalation capsule Place 1 capsule into inhaler and inhale Daily. 30 capsule 11    Tresiba FlexTouch 100 UNIT/ML solution pen-injector injection Inject 1,500 Units under the skin into the appropriate area as directed 1 (One) Time.      fluticasone (VERAMYST) 27.5 MCG/SPRAY nasal spray 2 sprays into the nostril(s) as directed by provider Daily.      Fluticasone Furoate-Vilanterol (BREO ELLIPTA) 100-25 MCG/INH inhaler Inhale 1 puff Daily.      glimepiride (AMARYL) 1 MG tablet Take 1 tablet by mouth Every Morning Before Breakfast.      ipratropium-albuterol (COMBIVENT RESPIMAT)  MCG/ACT inhaler Inhale 1 puff 4 (Four) Times a Day As Needed for Wheezing.       No current facility-administered medications for this visit.       Past Medical History:  Past Medical History:   Diagnosis Date    Anemia     Arthritis     Asthma     CHF (congestive heart failure)     Cirrhosis     COPD (chronic obstructive pulmonary disease)     Diabetes mellitus     Disease of thyroid gland     Diverticulitis     Encounter for blood transfusion     Fibromyalgia     Headache     Hypertension     Low back pain     Osteoporosis        Past Surgical History:  Past Surgical History:   Procedure Laterality Date    CHOLECYSTECTOMY  2012    HYSTERECTOMY  2003    SINUS SURGERY  2006       Social History:  Social History     Socioeconomic History    Marital status:    Tobacco Use    Smoking status: Every Day     Current packs/day: 0.25     Average packs/day:  "0.3 packs/day for 10.6 years (2.6 ttl pk-yrs)     Types: Cigarettes     Start date: 2014     Passive exposure: Past    Smokeless tobacco: Never   Vaping Use    Vaping status: Never Used   Substance and Sexual Activity    Alcohol use: No    Drug use: No    Sexual activity: Defer         Family History:  Family History   Problem Relation Age of Onset    COPD Mother     Hypertension Father     Heart disease Father        Review of Systems:  Review of Systems   Constitutional:  Positive for unexpected weight change.   Respiratory:  Positive for cough and shortness of breath.        Vital Signs:   /76   Pulse 112   Temp 98 °F (36.7 °C) (Temporal)   Resp 20   Ht 167.6 cm (66\")   Wt 57 kg (125 lb 9.6 oz)   SpO2 93%   BMI 20.27 kg/m²      Physical Exam:  Physical Exam  Vitals reviewed.   Constitutional:       General: She is not in acute distress.     Appearance: Normal appearance. She is not ill-appearing.      Comments: Uses a cane for ambulation. Currently in a wheelchair   HENT:      Head: Normocephalic and atraumatic.      Ears:      Comments: +hard of hearing     Mouth/Throat:      Mouth: Mucous membranes are moist.      Pharynx: Oropharynx is clear.   Eyes:      Conjunctiva/sclera: Conjunctivae normal.      Pupils: Pupils are equal, round, and reactive to light.   Cardiovascular:      Rate and Rhythm: Normal rate and regular rhythm.      Heart sounds: No murmur heard.  Pulmonary:      Effort: Pulmonary effort is normal. No respiratory distress.      Breath sounds: Normal breath sounds. No wheezing.   Abdominal:      General: Abdomen is flat. Bowel sounds are normal. There is no distension.      Palpations: Abdomen is soft. There is no mass.      Tenderness: There is no abdominal tenderness. There is no guarding.   Musculoskeletal:         General: No swelling. Normal range of motion.      Cervical back: Normal range of motion.   Lymphadenopathy:      Cervical: No cervical adenopathy.   Skin:     General: " "Skin is warm and dry.   Neurological:      General: No focal deficit present.      Mental Status: She is alert and oriented to person, place, and time. Mental status is at baseline.   Psychiatric:         Mood and Affect: Mood normal.         PHQ-9 Score  PHQ-9 Total Score: 0     Pain Score  Vitals:    07/22/24 1110   BP: 111/76   Pulse: 112   Resp: 20   Temp: 98 °F (36.7 °C)   TempSrc: Temporal   SpO2: 93%   Weight: 57 kg (125 lb 9.6 oz)   Height: 167.6 cm (66\")   PainSc: 0-No pain       ECOG score: 1           PAINSCOREQUALITYMETRIC:   Vitals:    07/22/24 1110   PainSc: 0-No pain          Lab Review  Lab Results   Component Value Date    WBC 6.38 07/22/2024    HGB 10.2 (L) 07/22/2024    HCT 33.3 (L) 07/22/2024    MCV 89.8 07/22/2024    RDW 15.4 07/22/2024     07/22/2024    NEUTRORELPCT 73.7 07/22/2024    LYMPHORELPCT 12.2 (L) 07/22/2024    MONORELPCT 11.0 07/22/2024    EOSRELPCT 2.2 07/22/2024    BASORELPCT 0.6 07/22/2024    NEUTROABS 4.70 07/22/2024    LYMPHSABS 0.78 07/22/2024       Lab Results   Component Value Date     07/22/2024    K 3.3 (L) 07/22/2024    CO2 26.1 07/22/2024    CL 98 07/22/2024    BUN 11 07/22/2024    CREATININE 0.63 07/22/2024    GLUCOSE 129 (H) 07/22/2024    CALCIUM 8.9 07/22/2024    ALKPHOS 201 (H) 07/22/2024    AST 32 07/22/2024    ALT 14 07/22/2024    BILITOT 1.6 (H) 07/22/2024    ALBUMIN 3.3 (L) 07/22/2024    PROTEINTOT 7.3 07/22/2024       Radiology Results  Scanned Imaging   PET/CT Scan (07/10/2024)         Chest XR (06/12/2024)          Pathology:   06/14/2024            Assessment / Plan         Assessment and Plan   Conchita Ross is a 78 y.o. presents for     Non small cell lung carcinoma, squamous cell carcinoma.   -Patient presents with cough ~6 months, hemoptysis, and weight loss (25 lbs). CT chest with L hilar mass. Underwent bronchoalveolar lavage at [facility] with pathology showing BAL L lobe with fragments of squamous cell carcinoma " (6/12/2024)  -PET/CT (7/10/2024) with 5 x 3 cm left lower lobe mass abutting the hilum, and 8 mm right upper lobe spiculated nodule likely representing second primary malignancy vs metastatic. (This read was clarified by the reading radiologist, who will make an addendum on the PET/CT).   -We discussed the RUL 8 mm nodule is concerning for either a new primary or metastatic disease from LLL/L hilar lobe, we would need further diagnostics to be certain. Patient is not interested in further diagnostics/bronchoscopy or CT guided needle biopsy, would like to proceed with assumption that it is metastatic. She is aware that pathology from LLL/L hilar mass showed squamous cell carcinoma.   -We discussed palliative treatment with carboplatin, paclitaxel, and pembrolizumab q21d x 4 cycles followed by pembrolizumab 200 mg maintenance. She is worried about the chemotherapy given she lives alone, and we decided to reduce the dose of carboplatin/taxol by 50%, carboplatin AUC 3 and paclitaxel 100 mg/m2, with probably increase if she tolerates, she is agreeable to the therapy above. Adverse effects were discussed such as fatigue, nausea, vomiting, diarrhea, pneumonitis, colitis, thyroiditis. She is agreeable.  -Would perform NGS panel, given we have minimal tissue and she is not interested in further tissue biopsy, would only do NGS on peripheral blood.   -MRI brain ordered to complete staging studies   -Referred to general surgery for port placement     2. Malignancy associated pain  - Currently denies     3. Nutrition  - We stressed the importance of maintaining diet and supplementing with boost/ensure 2-3 each day    4. Cirrhosis   - CTM    5. COPD  - Ordered for portable oxygen 2L NC. Currently uses nightly oxygen but feels it is necessary throughout the day         Discussed possible differential diagnoses, testing, treatment, recommended non-pharmacological interventions, risks, warning signs to monitor for that would  indicate need for follow-up in clinic or ER. If no improvement with these regimens or you have new or worsening symptoms follow-up. Patient verbalizes understanding and agreement with plan of care. Denies further needs or concerns.     Patient was given instructions and counseling regarding her condition and for health maintenance advised.    I spent 60 minutes caring for Conchita on this date of service. This time includes time spent by me in the following activities: preparing for the visit, reviewing tests, performing a medically appropriate examination and/or evaluation, counseling and educating the patient/family/caregiver, referring and communicating with other health care professionals, and documenting information in the medical record.    All questions were answered to her satisfaction.    BMI cannot be calculated due to outdated height or weight values.  Please input a current height/weight in Vitals and re-renter BMIFOLLOWUP in Note to pull in correct documentation based on BMI range.        ACO / KENDALL/Other  Quality measures  -  Conchita Ross did not receive 2023 flu vaccine.  This was recommended.  -  Conchita Ross reports a pain score of 0.  Given her pain assessment as noted, treatment options were discussed and the following options were decided upon as a follow-up plan to address the patient's pain: continuation of current treatment plan for pain.  -  Current outpatient and discharge medications have been reconciled for the patient.  Reviewed by: Rosenda Cassidy MD        Meds ordered during this visit  No orders of the defined types were placed in this encounter.      Visit Diagnoses    ICD-10-CM ICD-9-CM   1. Non-small cell cancer of left lung  C34.92 162.9       Follow Up   Prior to cycle 2 of treatment        This document has been electronically signed by Rosenda Cassidy MD   July 22, 2024 14:10 EDT    Dictated Utilizing Dragon Dictation: Part of this note may be an electronic  transcription/translation of spoken language to printed text using the Dragon Dictation System.

## 2024-07-22 NOTE — PROGRESS NOTES
Venipuncture Blood Specimen Collection  Venipuncture performed in left arm by Melina Marcus MA with good hemostasis. Patient tolerated the procedure well without complications.   07/22/24   Melina Marcus MA

## 2024-07-22 NOTE — H&P (VIEW-ONLY)
Subjective   Conchita Ross is a 78 y.o. female who presents today for Initial Evaluation    Chief Complaint:    Chief Complaint   Patient presents with    port placement        History of Present Illness:    History of Present Illness Conchita is a 78-year-old female who presents for an evaluation for port placement.  Patient has a new diagnosis of non-small cell lung cancer of the left lung.  She has a personal history of Rees.  States that she developed hemoptysis and was evaluated at [facility] in which she recently underwent biopsy that confirmed Non-small cell lung cancer of the left lung. She has never had a port in the past. She does not take any blood thinning medications.     The following portions of the patient's history were reviewed and updated as appropriate: allergies, current medications, past family history, past medical history, past social history, past surgical history and problem list.    Past Medical History:  Past Medical History:   Diagnosis Date    Anemia     Arthritis     Asthma     CHF (congestive heart failure)     Cirrhosis     COPD (chronic obstructive pulmonary disease)     Diabetes mellitus     Disease of thyroid gland     Diverticulitis     Encounter for blood transfusion     Fibromyalgia     Headache     Hypertension     Low back pain     Osteoporosis        Social History:  Social History     Socioeconomic History    Marital status:    Tobacco Use    Smoking status: Every Day     Current packs/day: 0.25     Average packs/day: 0.3 packs/day for 10.6 years (2.6 ttl pk-yrs)     Types: Cigarettes     Start date: 2014     Passive exposure: Past    Smokeless tobacco: Never   Vaping Use    Vaping status: Never Used   Substance and Sexual Activity    Alcohol use: No    Drug use: No    Sexual activity: Defer       Family History:  Family History   Problem Relation Age of Onset    COPD Mother     Hypertension Father     Heart disease Father        Past Surgical  History:  Past Surgical History:   Procedure Laterality Date    CHOLECYSTECTOMY  2012    HYSTERECTOMY  2003    SINUS SURGERY  2006       Problem List:  Patient Active Problem List   Diagnosis    Non-small cell cancer of left lung       Allergy:   Allergies   Allergen Reactions    Ciprofloxacin Nausea Only        Current Medications:   Current Outpatient Medications   Medication Sig Dispense Refill    doxepin (SINEquan) 50 MG capsule Take 1 capsule by mouth Daily.      fluticasone (VERAMYST) 27.5 MCG/SPRAY nasal spray 2 sprays into the nostril(s) as directed by provider Daily.      Fluticasone Furoate-Vilanterol (BREO ELLIPTA) 100-25 MCG/INH inhaler Inhale 1 puff Daily.      furosemide (LASIX) 20 MG tablet Take 1 tablet by mouth Daily.      glimepiride (AMARYL) 1 MG tablet Take 1 tablet by mouth Every Morning Before Breakfast.      ipratropium-albuterol (COMBIVENT RESPIMAT)  MCG/ACT inhaler Inhale 1 puff 4 (Four) Times a Day As Needed for Wheezing.      lactulose (CHRONULAC) 10 GM/15ML solution Take 45 mL by mouth 3 (Three) Times a Day.      levocetirizine (XYZAL) 5 MG tablet Take 1 tablet by mouth Every Evening.      levothyroxine (SYNTHROID, LEVOTHROID) 75 MCG tablet Take 1 tablet by mouth Daily.      tiotropium (SPIRIVA) 18 MCG per inhalation capsule Place 1 capsule into inhaler and inhale Daily. 30 capsule 11    Tresiba FlexTouch 100 UNIT/ML solution pen-injector injection Inject 1,500 Units under the skin into the appropriate area as directed 1 (One) Time.       No current facility-administered medications for this visit.       Review of Systems:    Review of Systems   Respiratory:  Positive for shortness of breath.    Neurological:  Positive for dizziness.       Physical Exam:   Physical Exam  Constitutional:       Appearance: Normal appearance.   HENT:      Head: Normocephalic and atraumatic.      Right Ear: External ear normal.      Left Ear: External ear normal.   Eyes:      Conjunctiva/sclera:  "Conjunctivae normal.   Pulmonary:      Effort: Pulmonary effort is normal.   Abdominal:      General: Abdomen is flat.   Musculoskeletal:         General: Normal range of motion.      Cervical back: Normal range of motion and neck supple.      Comments: Presents in wheelchair   Skin:     General: Skin is warm and dry.      Capillary Refill: Capillary refill takes less than 2 seconds.   Neurological:      General: No focal deficit present.      Mental Status: She is alert and oriented to person, place, and time.   Psychiatric:         Mood and Affect: Mood normal.         Behavior: Behavior normal.       Labs: I have reviewed, CBC, CMP and PT/INR that were drawn today.     Vitals:  Blood pressure 111/76, height 167.6 cm (65.98\"), weight 56.7 kg (125 lb).   Body mass index is 20.19 kg/m².     Assessment & Plan   Diagnoses and all orders for this visit:    1. Non-small cell cancer of left lung (Primary)  -     Protime-INR; Future    2. INGRAM (nonalcoholic steatohepatitis)  -     Protime-INR; Future    3. Dizziness  -     Ambulatory Referral to Cardiology    Conchita is a 78 year-old female who presents with Non-small cell ling cancer who is in need of Port Placement. She will undergo port placement with Dr. Mendez this Thursday. I have ordered a PT-INR to further evaluate due to hx of INGRAM. Platelets are normal on CBC that was done today, LFT's normal.  She verbalized understanding of prep instructions and procedure and wishes to proceed.    Patient has been referred to cardiology.  Patient has received appointment.  I have also discussed with STEVE Malone about patient stating that she needed portable oxygen.  She has already faxed order to save at home care.    Visit Diagnoses:    ICD-10-CM ICD-9-CM   1. Non-small cell cancer of left lung  C34.92 162.9   2. INGRAM (nonalcoholic steatohepatitis)  K75.81 571.8   3. Dizziness  R42 780.4         MEDS ORDERED DURING VISIT:  No orders of the defined types were placed in this " encounter.      Return if symptoms worsen or fail to improve.             This document has been electronically signed by KESHAV Mckenzie  July 22, 2024 14:28 EDT    Please note that portions of this note were completed with a voice recognition program.

## 2024-07-23 ENCOUNTER — PATIENT OUTREACH (OUTPATIENT)
Dept: CASE MANAGEMENT | Facility: OTHER | Age: 79
End: 2024-07-23
Payer: MEDICARE

## 2024-07-23 NOTE — OUTREACH NOTE
AMBULATORY CASE MANAGEMENT NOTE    Names and Relationships of Patient/Support Persons: Contact: Conchita Ross; Relationship: Self -     Patient Outreach    Patient reports she experienced episode of pain in the middle of her chest last HS. Resolved without intervention.  Complains of shortness of breath with exertion, relieved with rest. Patient states she is eating several small meals throughout the day and trying to drink adequate fluids. Denies problems with bowel or bladder.  Patient was seen by surgery yesterday for port placement. Unsure of when to be at the hospital.  [facility] surgery office contacted, no surgery appointment at this time. [facility]  to call patient today and update her on her findings.  Medications reviewed with patient. Role of oncology CM explained to patient, agreeable to service.    Education Documentation  Symptom Management - shortness of breath, taught by Britt Enriquez, RN at 7/23/2024 12:15 PM.  Learner: Patient  Readiness: Acceptance  Method: Explanation  Response: Needs Reinforcement          Britt SIMEON  Ambulatory Case Management    7/23/2024, 12:16 EDT

## 2024-07-25 ENCOUNTER — ANESTHESIA (OUTPATIENT)
Dept: PERIOP | Facility: HOSPITAL | Age: 79
End: 2024-07-25
Payer: MEDICARE

## 2024-07-25 ENCOUNTER — ANESTHESIA EVENT (OUTPATIENT)
Dept: PERIOP | Facility: HOSPITAL | Age: 79
End: 2024-07-25
Payer: MEDICARE

## 2024-07-25 ENCOUNTER — APPOINTMENT (OUTPATIENT)
Dept: GENERAL RADIOLOGY | Facility: HOSPITAL | Age: 79
End: 2024-07-25
Payer: MEDICARE

## 2024-07-25 ENCOUNTER — HOSPITAL ENCOUNTER (OUTPATIENT)
Facility: HOSPITAL | Age: 79
Setting detail: HOSPITAL OUTPATIENT SURGERY
Discharge: HOME | End: 2024-07-25
Attending: SURGERY | Admitting: SURGERY
Payer: MEDICARE

## 2024-07-25 VITALS
DIASTOLIC BLOOD PRESSURE: 63 MMHG | OXYGEN SATURATION: 96 % | RESPIRATION RATE: 18 BRPM | SYSTOLIC BLOOD PRESSURE: 115 MMHG | TEMPERATURE: 97.7 F | HEART RATE: 104 BPM

## 2024-07-25 DIAGNOSIS — C34.92 NON-SMALL CELL CANCER OF LEFT LUNG: Primary | ICD-10-CM

## 2024-07-25 LAB — GLUCOSE BLDC GLUCOMTR-MCNC: 132 MG/DL (ref 70–130)

## 2024-07-25 PROCEDURE — 25010000002 HEPARIN LOCK FLUSH PER 10 UNITS: Performed by: SURGERY

## 2024-07-25 PROCEDURE — 25010000002 ONDANSETRON PER 1 MG: Performed by: NURSE ANESTHETIST, CERTIFIED REGISTERED

## 2024-07-25 PROCEDURE — 77001 FLUOROGUIDE FOR VEIN DEVICE: CPT | Performed by: SURGERY

## 2024-07-25 PROCEDURE — 76000 FLUOROSCOPY <1 HR PHYS/QHP: CPT | Performed by: RADIOLOGY

## 2024-07-25 PROCEDURE — 36561 INSERT TUNNELED CV CATH: CPT | Performed by: SURGERY

## 2024-07-25 PROCEDURE — 25010000002 CEFAZOLIN PER 500 MG: Performed by: SURGERY

## 2024-07-25 PROCEDURE — 82948 REAGENT STRIP/BLOOD GLUCOSE: CPT

## 2024-07-25 PROCEDURE — C1788 PORT, INDWELLING, IMP: HCPCS | Performed by: SURGERY

## 2024-07-25 PROCEDURE — 25010000002 FENTANYL CITRATE (PF) 50 MCG/ML SOLUTION: Performed by: NURSE ANESTHETIST, CERTIFIED REGISTERED

## 2024-07-25 PROCEDURE — 76000 FLUOROSCOPY <1 HR PHYS/QHP: CPT

## 2024-07-25 PROCEDURE — 25010000002 DEXAMETHASONE PER 1 MG: Performed by: NURSE ANESTHETIST, CERTIFIED REGISTERED

## 2024-07-25 PROCEDURE — 25010000002 LIDOCAINE 1 % SOLUTION: Performed by: SURGERY

## 2024-07-25 PROCEDURE — 71045 X-RAY EXAM CHEST 1 VIEW: CPT

## 2024-07-25 PROCEDURE — 71045 X-RAY EXAM CHEST 1 VIEW: CPT | Performed by: RADIOLOGY

## 2024-07-25 PROCEDURE — 76937 US GUIDE VASCULAR ACCESS: CPT | Performed by: SURGERY

## 2024-07-25 PROCEDURE — 25010000002 PROPOFOL 200 MG/20ML EMULSION: Performed by: NURSE ANESTHETIST, CERTIFIED REGISTERED

## 2024-07-25 PROCEDURE — 25810000003 LACTATED RINGERS PER 1000 ML: Performed by: ANESTHESIOLOGY

## 2024-07-25 DEVICE — POWERPORT CLEARVUE ISP IMPLANTABLE PORT WITH ATTACHABLE 8F POLYURETHANE OPEN-ENDED SINGLE-LUMEN VENOUS CATHETER PROCEDURAL KIT
Type: IMPLANTABLE DEVICE | Site: SUBCLAVIAN | Status: FUNCTIONAL
Brand: POWERPORT CLEARVUE

## 2024-07-25 RX ORDER — SODIUM CHLORIDE 9 MG/ML
40 INJECTION, SOLUTION INTRAVENOUS AS NEEDED
Status: DISCONTINUED | OUTPATIENT
Start: 2024-07-25 | End: 2024-07-25 | Stop reason: HOSPADM

## 2024-07-25 RX ORDER — HEPARIN SODIUM (PORCINE) LOCK FLUSH IV SOLN 100 UNIT/ML 100 UNIT/ML
SOLUTION INTRAVENOUS AS NEEDED
Status: DISCONTINUED | OUTPATIENT
Start: 2024-07-25 | End: 2024-07-25 | Stop reason: HOSPADM

## 2024-07-25 RX ORDER — FENTANYL CITRATE 50 UG/ML
50 INJECTION, SOLUTION INTRAMUSCULAR; INTRAVENOUS
Status: DISCONTINUED | OUTPATIENT
Start: 2024-07-25 | End: 2024-07-25 | Stop reason: HOSPADM

## 2024-07-25 RX ORDER — MIDAZOLAM HYDROCHLORIDE 1 MG/ML
0.5 INJECTION INTRAMUSCULAR; INTRAVENOUS
Status: DISCONTINUED | OUTPATIENT
Start: 2024-07-25 | End: 2024-07-25 | Stop reason: HOSPADM

## 2024-07-25 RX ORDER — SODIUM CHLORIDE 0.9 % (FLUSH) 0.9 %
10 SYRINGE (ML) INJECTION AS NEEDED
Status: DISCONTINUED | OUTPATIENT
Start: 2024-07-25 | End: 2024-07-25 | Stop reason: HOSPADM

## 2024-07-25 RX ORDER — MAGNESIUM HYDROXIDE 1200 MG/15ML
LIQUID ORAL AS NEEDED
Status: DISCONTINUED | OUTPATIENT
Start: 2024-07-25 | End: 2024-07-25 | Stop reason: HOSPADM

## 2024-07-25 RX ORDER — SODIUM CHLORIDE 0.9 % (FLUSH) 0.9 %
10 SYRINGE (ML) INJECTION EVERY 12 HOURS SCHEDULED
Status: DISCONTINUED | OUTPATIENT
Start: 2024-07-25 | End: 2024-07-25 | Stop reason: HOSPADM

## 2024-07-25 RX ORDER — FAMOTIDINE 10 MG/ML
INJECTION, SOLUTION INTRAVENOUS AS NEEDED
Status: DISCONTINUED | OUTPATIENT
Start: 2024-07-25 | End: 2024-07-25 | Stop reason: SURG

## 2024-07-25 RX ORDER — DEXAMETHASONE SODIUM PHOSPHATE 4 MG/ML
INJECTION, SOLUTION INTRA-ARTICULAR; INTRALESIONAL; INTRAMUSCULAR; INTRAVENOUS; SOFT TISSUE AS NEEDED
Status: DISCONTINUED | OUTPATIENT
Start: 2024-07-25 | End: 2024-07-25 | Stop reason: SURG

## 2024-07-25 RX ORDER — ONDANSETRON 2 MG/ML
INJECTION INTRAMUSCULAR; INTRAVENOUS AS NEEDED
Status: DISCONTINUED | OUTPATIENT
Start: 2024-07-25 | End: 2024-07-25 | Stop reason: SURG

## 2024-07-25 RX ORDER — PROPOFOL 10 MG/ML
INJECTION, EMULSION INTRAVENOUS AS NEEDED
Status: DISCONTINUED | OUTPATIENT
Start: 2024-07-25 | End: 2024-07-25 | Stop reason: SURG

## 2024-07-25 RX ORDER — FENTANYL CITRATE 50 UG/ML
INJECTION, SOLUTION INTRAMUSCULAR; INTRAVENOUS AS NEEDED
Status: DISCONTINUED | OUTPATIENT
Start: 2024-07-25 | End: 2024-07-25 | Stop reason: SURG

## 2024-07-25 RX ORDER — SODIUM CHLORIDE, SODIUM LACTATE, POTASSIUM CHLORIDE, CALCIUM CHLORIDE 600; 310; 30; 20 MG/100ML; MG/100ML; MG/100ML; MG/100ML
125 INJECTION, SOLUTION INTRAVENOUS ONCE
Status: DISCONTINUED | OUTPATIENT
Start: 2024-07-25 | End: 2024-07-25 | Stop reason: HOSPADM

## 2024-07-25 RX ORDER — IPRATROPIUM BROMIDE AND ALBUTEROL SULFATE 2.5; .5 MG/3ML; MG/3ML
3 SOLUTION RESPIRATORY (INHALATION) ONCE AS NEEDED
Status: DISCONTINUED | OUTPATIENT
Start: 2024-07-25 | End: 2024-07-25 | Stop reason: HOSPADM

## 2024-07-25 RX ORDER — LIDOCAINE HYDROCHLORIDE 10 MG/ML
INJECTION, SOLUTION INFILTRATION; PERINEURAL AS NEEDED
Status: DISCONTINUED | OUTPATIENT
Start: 2024-07-25 | End: 2024-07-25 | Stop reason: HOSPADM

## 2024-07-25 RX ORDER — SODIUM CHLORIDE, SODIUM LACTATE, POTASSIUM CHLORIDE, CALCIUM CHLORIDE 600; 310; 30; 20 MG/100ML; MG/100ML; MG/100ML; MG/100ML
125 INJECTION, SOLUTION INTRAVENOUS ONCE
Status: COMPLETED | OUTPATIENT
Start: 2024-07-25 | End: 2024-07-25

## 2024-07-25 RX ORDER — PHENYLEPHRINE HCL IN 0.9% NACL 1 MG/10 ML
SYRINGE (ML) INTRAVENOUS AS NEEDED
Status: DISCONTINUED | OUTPATIENT
Start: 2024-07-25 | End: 2024-07-25 | Stop reason: SURG

## 2024-07-25 RX ORDER — ONDANSETRON 2 MG/ML
4 INJECTION INTRAMUSCULAR; INTRAVENOUS AS NEEDED
Status: DISCONTINUED | OUTPATIENT
Start: 2024-07-25 | End: 2024-07-25 | Stop reason: HOSPADM

## 2024-07-25 RX ORDER — ACETAMINOPHEN 325 MG/1
650 TABLET ORAL EVERY 4 HOURS PRN
Qty: 30 TABLET | Refills: 0 | Status: SHIPPED | OUTPATIENT
Start: 2024-07-25 | End: 2024-08-02

## 2024-07-25 RX ORDER — OXYCODONE HYDROCHLORIDE AND ACETAMINOPHEN 5; 325 MG/1; MG/1
1 TABLET ORAL ONCE AS NEEDED
Status: DISCONTINUED | OUTPATIENT
Start: 2024-07-25 | End: 2024-07-25 | Stop reason: HOSPADM

## 2024-07-25 RX ORDER — MEPERIDINE HYDROCHLORIDE 25 MG/ML
12.5 INJECTION INTRAMUSCULAR; INTRAVENOUS; SUBCUTANEOUS
Status: DISCONTINUED | OUTPATIENT
Start: 2024-07-25 | End: 2024-07-25 | Stop reason: HOSPADM

## 2024-07-25 RX ORDER — SODIUM CHLORIDE, SODIUM LACTATE, POTASSIUM CHLORIDE, CALCIUM CHLORIDE 600; 310; 30; 20 MG/100ML; MG/100ML; MG/100ML; MG/100ML
100 INJECTION, SOLUTION INTRAVENOUS ONCE AS NEEDED
Status: DISCONTINUED | OUTPATIENT
Start: 2024-07-25 | End: 2024-07-25 | Stop reason: HOSPADM

## 2024-07-25 RX ADMIN — Medication 100 MCG: at 11:47

## 2024-07-25 RX ADMIN — FENTANYL CITRATE 100 MCG: 50 INJECTION INTRAMUSCULAR; INTRAVENOUS at 11:33

## 2024-07-25 RX ADMIN — FAMOTIDINE 20 MG: 10 INJECTION, SOLUTION INTRAVENOUS at 11:31

## 2024-07-25 RX ADMIN — PROPOFOL 80 MG: 10 INJECTION, EMULSION INTRAVENOUS at 11:33

## 2024-07-25 RX ADMIN — Medication 100 MCG: at 11:40

## 2024-07-25 RX ADMIN — DEXAMETHASONE SODIUM PHOSPHATE 4 MG: 4 INJECTION, SOLUTION INTRA-ARTICULAR; INTRALESIONAL; INTRAMUSCULAR; INTRAVENOUS; SOFT TISSUE at 11:47

## 2024-07-25 RX ADMIN — Medication 100 MCG: at 11:52

## 2024-07-25 RX ADMIN — ONDANSETRON 4 MG: 2 INJECTION INTRAMUSCULAR; INTRAVENOUS at 11:31

## 2024-07-25 RX ADMIN — SODIUM CHLORIDE, POTASSIUM CHLORIDE, SODIUM LACTATE AND CALCIUM CHLORIDE: 600; 310; 30; 20 INJECTION, SOLUTION INTRAVENOUS at 11:28

## 2024-07-25 RX ADMIN — CEFAZOLIN 2000 MG: 2 INJECTION, POWDER, FOR SOLUTION INTRAMUSCULAR; INTRAVENOUS at 11:30

## 2024-07-25 NOTE — ANESTHESIA PREPROCEDURE EVALUATION
Anesthesia Evaluation     no history of anesthetic complications:   NPO Solid Status: > 8 hours  NPO Liquid Status: > 8 hours           Airway   Mallampati: II  TM distance: >3 FB  Neck ROM: full  No difficulty expected  Dental    (+) upper dentures and lower dentures    Pulmonary - normal exam   (+) lung cancer, COPD, asthma,  Cardiovascular - normal exam    (+) CHF       Neuro/Psych  (+) headaches  GI/Hepatic/Renal/Endo    (+) liver disease, diabetes mellitus, thyroid problem     Musculoskeletal     Abdominal  - normal exam   Substance History      OB/GYN          Other      history of cancer active    ROS/Med Hx Other: Lung Cancer              Anesthesia Plan    ASA 3     general     intravenous induction     Anesthetic plan, risks, benefits, and alternatives have been provided, discussed and informed consent has been obtained with: patient.    CODE STATUS:

## 2024-07-25 NOTE — ANESTHESIA PROCEDURE NOTES
Airway  Urgency: elective    Date/Time: 7/25/2024 11:33 AM    General Information and Staff    Patient location during procedure: OR  CRNA/CAA: Britni Calle CRNA    Indications and Patient Condition    Preoxygenated: yes      Final Airway Details  Final airway type: supraglottic airway      Successful airway: unique  Size 4     Number of attempts at approach: 1  Assessment: lips, teeth, and gum same as pre-op and atraumatic intubation

## 2024-07-25 NOTE — OP NOTE
INSERTION OF PORTACATH  Procedure Note    Conchita Ross  7/25/2024    Pre-op Diagnosis:   Non-small cell cancer of left lung [C34.92]    Post-op Diagnosis:     Post-Op Diagnosis Codes:     * Non-small cell cancer of left lung [C34.92]    Procedure(s):  INSERTION OF PORTACATH    Surgeon(s):  Austen Mendez MD    Indication:  see above    Anesthesia: Choice    Staff:   Circulator: Britni Hager RN  Radiology Technologist: Guzman Pinedo RT  Scrub Person: Ruth Quezada  Assistant: Aiden Hernandez    Operative Description: The patient was taken to the operating suite and placed supine on operating table.  Bilateral sequential compression devices were applied and anesthesia was administered.  The right neck and chest were prepped and draped in sterile fashion.  Timeout procedure was performed after antibiotics had been confirmed.   The right neck and chest were then infiltrated with local anesthetic.  Under ultrasound visualization an 18-gauge access needle was inserted into the right internal jugular vein under direct visualization.  Venous blood was aspirated.  Through the access needle guidewire was placed without resistance.  Ultrasound and fluoroscopy confirmed guidewire in appropriate position and course.  A stab incision at the guidewire entry site was made.  Two fingerbreadths below the right clavicle, a transverse incision was then made and with blunt and cautery dissection a subcutaneous pocket was created.  The catheter was then tunneled subcutaneously over the clavicle through the guidewire entry site in the right neck.  The catheter was cut to appropriate length based on patient height and the port was assembled and placed in the subcutaneous pocket.  Under fluoroscopic visualization the dilator introducer sheath was inserted via Seldinger technique over the guidewire into the right internal jugular vein.  The guidewire and sheath were removed.  The catheter was inserted into the  removable sheath and the sheath was removed.  Fluoroscopy showed the catheter tip in appropriate position with no evidence of kinking.  The port was accessed and withdrew venous blood easily and flushed with heparinized saline solution easily.  The port was secured to the pectoralis fascia with Prolene sutures.  The port site was closed with subcutaneous Vicryl and Monocryl subcuticular suture.  The right neck incision was closed with a subcuticular Monocryl suture.  The incisions were dressed with SureClose and the patient was awakened from anesthesia after all counts were correct and taken to recovery where stat chest x-ray was ordered confirming placement.    Estimated Blood Loss: 10mL    Specimens:   none                 Drains: none    Grafts/Implants: R IJ port    Findings: Port withdrew and flushed easily    Complications: none      Austen Mendez MD     Date: 7/25/2024  Time: 12:05 EDT

## 2024-07-25 NOTE — ANESTHESIA POSTPROCEDURE EVALUATION
Patient: Conchita Ross    Procedure Summary       Date: 07/25/24 Room / Location:  [facility]  /  [facility]    Anesthesia Start: 1129 Anesthesia Stop: 1200    Procedure: INSERTION OF PORTACATH Diagnosis:       Non-small cell cancer of left lung      (Non-small cell cancer of left lung [C34.92])    Surgeons: Austen Mendez MD Provider: Barrington Ibrahim MD    Anesthesia Type: general ASA Status: 3            Anesthesia Type: general    Vitals  Vitals Value Taken Time   /58 07/25/24 1222   Temp 97.6 °F (36.4 °C) 07/25/24 1201   Pulse 105 07/25/24 1226   Resp 16 07/25/24 1216   SpO2 97 % 07/25/24 1226   Vitals shown include unfiled device data.        Post Anesthesia Care and Evaluation    Patient location during evaluation: PHASE II  Patient participation: complete - patient participated  Level of consciousness: awake and alert  Pain score: 1  Pain management: adequate    Airway patency: patent  Anesthetic complications: No anesthetic complications  PONV Status: controlled  Cardiovascular status: acceptable  Respiratory status: acceptable  Hydration status: acceptable

## 2024-07-30 DIAGNOSIS — C34.92 NON-SMALL CELL CANCER OF LEFT LUNG: Primary | ICD-10-CM

## 2024-07-30 RX ORDER — FAMOTIDINE 10 MG/ML
20 INJECTION, SOLUTION INTRAVENOUS AS NEEDED
Status: CANCELLED | OUTPATIENT
Start: 2024-08-01

## 2024-07-30 RX ORDER — DIPHENHYDRAMINE HYDROCHLORIDE 50 MG/ML
50 INJECTION INTRAMUSCULAR; INTRAVENOUS AS NEEDED
OUTPATIENT
Start: 2024-08-22

## 2024-07-30 RX ORDER — DIPHENHYDRAMINE HYDROCHLORIDE 50 MG/ML
50 INJECTION INTRAMUSCULAR; INTRAVENOUS AS NEEDED
Status: CANCELLED | OUTPATIENT
Start: 2024-08-01

## 2024-07-30 RX ORDER — FAMOTIDINE 10 MG/ML
20 INJECTION, SOLUTION INTRAVENOUS AS NEEDED
OUTPATIENT
Start: 2024-08-22

## 2024-07-30 RX ORDER — FAMOTIDINE 10 MG/ML
20 INJECTION, SOLUTION INTRAVENOUS AS NEEDED
OUTPATIENT
Start: 2024-09-12

## 2024-07-30 RX ORDER — FAMOTIDINE 10 MG/ML
20 INJECTION, SOLUTION INTRAVENOUS ONCE
OUTPATIENT
Start: 2024-08-22

## 2024-07-30 RX ORDER — DIPHENHYDRAMINE HYDROCHLORIDE 50 MG/ML
50 INJECTION INTRAMUSCULAR; INTRAVENOUS AS NEEDED
OUTPATIENT
Start: 2024-09-12

## 2024-07-30 RX ORDER — FAMOTIDINE 10 MG/ML
20 INJECTION, SOLUTION INTRAVENOUS ONCE
Status: CANCELLED | OUTPATIENT
Start: 2024-08-01

## 2024-07-30 RX ORDER — FAMOTIDINE 10 MG/ML
20 INJECTION, SOLUTION INTRAVENOUS ONCE
OUTPATIENT
Start: 2024-09-12

## 2024-07-30 RX ORDER — SODIUM CHLORIDE 9 MG/ML
20 INJECTION, SOLUTION INTRAVENOUS ONCE
OUTPATIENT
Start: 2024-08-22

## 2024-07-30 RX ORDER — SODIUM CHLORIDE 9 MG/ML
20 INJECTION, SOLUTION INTRAVENOUS ONCE
Status: CANCELLED | OUTPATIENT
Start: 2024-08-01

## 2024-07-30 RX ORDER — PALONOSETRON 0.05 MG/ML
0.25 INJECTION, SOLUTION INTRAVENOUS ONCE
OUTPATIENT
Start: 2024-09-12

## 2024-07-30 RX ORDER — SODIUM CHLORIDE 9 MG/ML
20 INJECTION, SOLUTION INTRAVENOUS ONCE
OUTPATIENT
Start: 2024-09-12

## 2024-07-30 RX ORDER — PALONOSETRON 0.05 MG/ML
0.25 INJECTION, SOLUTION INTRAVENOUS ONCE
OUTPATIENT
Start: 2024-08-22

## 2024-07-30 RX ORDER — PALONOSETRON 0.05 MG/ML
0.25 INJECTION, SOLUTION INTRAVENOUS ONCE
Status: CANCELLED | OUTPATIENT
Start: 2024-08-01

## 2024-07-31 ENCOUNTER — INFUSION (OUTPATIENT)
Dept: ONCOLOGY | Facility: HOSPITAL | Age: 79
End: 2024-07-31
Payer: MEDICARE

## 2024-07-31 ENCOUNTER — LAB (OUTPATIENT)
Dept: ONCOLOGY | Facility: CLINIC | Age: 79
End: 2024-07-31
Payer: MEDICARE

## 2024-07-31 ENCOUNTER — OFFICE VISIT (OUTPATIENT)
Dept: ONCOLOGY | Facility: CLINIC | Age: 79
End: 2024-07-31
Payer: MEDICARE

## 2024-07-31 VITALS
BODY MASS INDEX: 20.12 KG/M2 | DIASTOLIC BLOOD PRESSURE: 68 MMHG | HEART RATE: 110 BPM | TEMPERATURE: 97.7 F | RESPIRATION RATE: 18 BRPM | SYSTOLIC BLOOD PRESSURE: 108 MMHG | WEIGHT: 124.6 LBS | OXYGEN SATURATION: 97 %

## 2024-07-31 VITALS
HEART RATE: 114 BPM | TEMPERATURE: 97.3 F | SYSTOLIC BLOOD PRESSURE: 103 MMHG | RESPIRATION RATE: 18 BRPM | DIASTOLIC BLOOD PRESSURE: 67 MMHG | OXYGEN SATURATION: 93 %

## 2024-07-31 DIAGNOSIS — E87.6 HYPOKALEMIA: Primary | ICD-10-CM

## 2024-07-31 DIAGNOSIS — C34.92 NON-SMALL CELL CANCER OF LEFT LUNG: ICD-10-CM

## 2024-07-31 DIAGNOSIS — C34.92 NON-SMALL CELL CANCER OF LEFT LUNG: Primary | ICD-10-CM

## 2024-07-31 LAB
ALBUMIN SERPL-MCNC: 3.2 G/DL (ref 3.5–5.2)
ALBUMIN/GLOB SERPL: 0.8 G/DL
ALP SERPL-CCNC: 189 U/L (ref 39–117)
ALT SERPL W P-5'-P-CCNC: 17 U/L (ref 1–33)
ANION GAP SERPL CALCULATED.3IONS-SCNC: 10 MMOL/L (ref 5–15)
AST SERPL-CCNC: 34 U/L (ref 1–32)
BASOPHILS # BLD AUTO: 0.07 10*3/MM3 (ref 0–0.2)
BASOPHILS NFR BLD AUTO: 0.9 % (ref 0–1.5)
BILIRUB SERPL-MCNC: 1.8 MG/DL (ref 0–1.2)
BUN SERPL-MCNC: 8 MG/DL (ref 8–23)
BUN/CREAT SERPL: 12.9 (ref 7–25)
CALCIUM SPEC-SCNC: 8.7 MG/DL (ref 8.6–10.5)
CHLORIDE SERPL-SCNC: 97 MMOL/L (ref 98–107)
CO2 SERPL-SCNC: 29 MMOL/L (ref 22–29)
CREAT SERPL-MCNC: 0.62 MG/DL (ref 0.57–1)
DEPRECATED RDW RBC AUTO: 48.8 FL (ref 37–54)
EGFRCR SERPLBLD CKD-EPI 2021: 91.9 ML/MIN/1.73
EOSINOPHIL # BLD AUTO: 0.18 10*3/MM3 (ref 0–0.4)
EOSINOPHIL NFR BLD AUTO: 2.4 % (ref 0.3–6.2)
ERYTHROCYTE [DISTWIDTH] IN BLOOD BY AUTOMATED COUNT: 15.1 % (ref 12.3–15.4)
GLOBULIN UR ELPH-MCNC: 3.9 GM/DL
GLUCOSE SERPL-MCNC: 110 MG/DL (ref 65–99)
HCT VFR BLD AUTO: 34.2 % (ref 34–46.6)
HGB BLD-MCNC: 10.8 G/DL (ref 12–15.9)
IMM GRANULOCYTES # BLD AUTO: 0.03 10*3/MM3 (ref 0–0.05)
IMM GRANULOCYTES NFR BLD AUTO: 0.4 % (ref 0–0.5)
LYMPHOCYTES # BLD AUTO: 0.98 10*3/MM3 (ref 0.7–3.1)
LYMPHOCYTES NFR BLD AUTO: 13.3 % (ref 19.6–45.3)
MAGNESIUM SERPL-MCNC: 1.9 MG/DL (ref 1.6–2.4)
MCH RBC QN AUTO: 27.5 PG (ref 26.6–33)
MCHC RBC AUTO-ENTMCNC: 31.6 G/DL (ref 31.5–35.7)
MCV RBC AUTO: 87 FL (ref 79–97)
MONOCYTES # BLD AUTO: 0.74 10*3/MM3 (ref 0.1–0.9)
MONOCYTES NFR BLD AUTO: 10 % (ref 5–12)
NEUTROPHILS NFR BLD AUTO: 5.37 10*3/MM3 (ref 1.7–7)
NEUTROPHILS NFR BLD AUTO: 73 % (ref 42.7–76)
NRBC BLD AUTO-RTO: 0 /100 WBC (ref 0–0.2)
PLATELET # BLD AUTO: 209 10*3/MM3 (ref 140–450)
PMV BLD AUTO: 10 FL (ref 6–12)
POTASSIUM SERPL-SCNC: 2.4 MMOL/L (ref 3.5–5.2)
PROT SERPL-MCNC: 7.1 G/DL (ref 6–8.5)
RBC # BLD AUTO: 3.93 10*6/MM3 (ref 3.77–5.28)
SODIUM SERPL-SCNC: 136 MMOL/L (ref 136–145)
T4 FREE SERPL-MCNC: 1.5 NG/DL (ref 0.93–1.7)
TSH SERPL DL<=0.05 MIU/L-ACNC: 3.17 UIU/ML (ref 0.27–4.2)
WBC NRBC COR # BLD AUTO: 7.37 10*3/MM3 (ref 3.4–10.8)

## 2024-07-31 PROCEDURE — 96365 THER/PROPH/DIAG IV INF INIT: CPT

## 2024-07-31 PROCEDURE — 1125F AMNT PAIN NOTED PAIN PRSNT: CPT | Performed by: NURSE PRACTITIONER

## 2024-07-31 PROCEDURE — 96366 THER/PROPH/DIAG IV INF ADDON: CPT

## 2024-07-31 PROCEDURE — 80053 COMPREHEN METABOLIC PANEL: CPT | Performed by: INTERNAL MEDICINE

## 2024-07-31 PROCEDURE — 25010000002 POTASSIUM CHLORIDE 10 MEQ/100ML SOLUTION: Performed by: NURSE PRACTITIONER

## 2024-07-31 PROCEDURE — 1159F MED LIST DOCD IN RCRD: CPT | Performed by: NURSE PRACTITIONER

## 2024-07-31 PROCEDURE — 25010000002 HEPARIN LOCK FLUSH PER 10 UNITS: Performed by: NURSE PRACTITIONER

## 2024-07-31 PROCEDURE — 83735 ASSAY OF MAGNESIUM: CPT | Performed by: INTERNAL MEDICINE

## 2024-07-31 PROCEDURE — 85025 COMPLETE CBC W/AUTO DIFF WBC: CPT | Performed by: INTERNAL MEDICINE

## 2024-07-31 PROCEDURE — 84443 ASSAY THYROID STIM HORMONE: CPT | Performed by: INTERNAL MEDICINE

## 2024-07-31 PROCEDURE — 99214 OFFICE O/P EST MOD 30 MIN: CPT | Performed by: NURSE PRACTITIONER

## 2024-07-31 PROCEDURE — 1160F RVW MEDS BY RX/DR IN RCRD: CPT | Performed by: NURSE PRACTITIONER

## 2024-07-31 PROCEDURE — 84439 ASSAY OF FREE THYROXINE: CPT | Performed by: INTERNAL MEDICINE

## 2024-07-31 RX ORDER — SODIUM CHLORIDE 0.9 % (FLUSH) 0.9 %
10 SYRINGE (ML) INJECTION AS NEEDED
Status: DISCONTINUED | OUTPATIENT
Start: 2024-07-31 | End: 2024-07-31 | Stop reason: HOSPADM

## 2024-07-31 RX ORDER — SODIUM CHLORIDE 0.9 % (FLUSH) 0.9 %
10 SYRINGE (ML) INJECTION AS NEEDED
Status: CANCELLED | OUTPATIENT
Start: 2024-08-01

## 2024-07-31 RX ORDER — POTASSIUM CHLORIDE 20 MEQ/1
20 TABLET, EXTENDED RELEASE ORAL 2 TIMES DAILY
Qty: 6 TABLET | Refills: 0 | Status: ON HOLD | OUTPATIENT
Start: 2024-07-31 | End: 2024-08-03

## 2024-07-31 RX ORDER — LIDOCAINE AND PRILOCAINE 25; 25 MG/G; MG/G
CREAM TOPICAL
Qty: 30 G | Refills: 1 | Status: ON HOLD | OUTPATIENT
Start: 2024-07-31

## 2024-07-31 RX ORDER — OLANZAPINE 5 MG/1
5 TABLET ORAL NIGHTLY
Qty: 4 TABLET | Refills: 3 | Status: ON HOLD | OUTPATIENT
Start: 2024-07-31

## 2024-07-31 RX ORDER — HEPARIN SODIUM (PORCINE) LOCK FLUSH IV SOLN 100 UNIT/ML 100 UNIT/ML
500 SOLUTION INTRAVENOUS AS NEEDED
Status: DISCONTINUED | OUTPATIENT
Start: 2024-07-31 | End: 2024-07-31 | Stop reason: HOSPADM

## 2024-07-31 RX ORDER — HEPARIN SODIUM (PORCINE) LOCK FLUSH IV SOLN 100 UNIT/ML 100 UNIT/ML
500 SOLUTION INTRAVENOUS AS NEEDED
Status: CANCELLED | OUTPATIENT
Start: 2024-08-01

## 2024-07-31 RX ORDER — ONDANSETRON HYDROCHLORIDE 8 MG/1
8 TABLET, FILM COATED ORAL 3 TIMES DAILY PRN
Qty: 30 TABLET | Refills: 5 | Status: ON HOLD | OUTPATIENT
Start: 2024-07-31

## 2024-07-31 RX ORDER — POTASSIUM CHLORIDE 7.45 MG/ML
10 INJECTION INTRAVENOUS
Status: COMPLETED | OUTPATIENT
Start: 2024-07-31 | End: 2024-07-31

## 2024-07-31 RX ORDER — PROCHLORPERAZINE MALEATE 10 MG
10 TABLET ORAL EVERY 6 HOURS PRN
Qty: 30 TABLET | Refills: 1 | Status: ON HOLD | OUTPATIENT
Start: 2024-07-31

## 2024-07-31 RX ADMIN — POTASSIUM CHLORIDE 10 MEQ: 7.46 INJECTION, SOLUTION INTRAVENOUS at 12:32

## 2024-07-31 RX ADMIN — POTASSIUM CHLORIDE 10 MEQ: 7.46 INJECTION, SOLUTION INTRAVENOUS at 13:28

## 2024-07-31 RX ADMIN — HEPARIN 500 UNITS: 100 SYRINGE at 16:50

## 2024-07-31 RX ADMIN — POTASSIUM CHLORIDE 10 MEQ: 7.46 INJECTION, SOLUTION INTRAVENOUS at 14:33

## 2024-07-31 RX ADMIN — POTASSIUM CHLORIDE 10 MEQ: 7.46 INJECTION, SOLUTION INTRAVENOUS at 15:39

## 2024-07-31 NOTE — PROGRESS NOTES
Venipuncture Blood Specimen Collection  Venipuncture performed in left arm by Danica Gonzales MA with good hemostasis. Patient tolerated the procedure well without complications.   07/31/24   Danica Gonzales MA

## 2024-07-31 NOTE — PROGRESS NOTES
CHEMOTHERAPY PREPARATION    Conchita Ross  [id number]  1945    Chief Complaint: Chemotherapy Education    History of present illness:  Conchita Ross is a 78 y.o. year old female who is here today for chemotherapy preparation and needs assessment. The patient has been diagnosed with non-small cell lung cancer and is scheduled to begin treatment with Carboplatin/Taxol/Pembrolizumab on 08/01/2024. She is anxious regarding treatment and its side effects. She is otherwise without specific complaints today.     Oncology History:    Oncology/Hematology History   Non-small cell cancer of left lung   7/22/2024 Initial Diagnosis    Non-small cell cancer of left lung     8/1/2024 -  Chemotherapy    OP LUNG Pembrolizumab 200 mg / PACLitaxel / CARBOplatin AUC=6       Past Medical History:   Diagnosis Date    Anemia     Arthritis     Asthma     Cancer     squamous cell carinoma lung Left    CHF (congestive heart failure)     Cirrhosis     COPD (chronic obstructive pulmonary disease)     Diabetes mellitus     Disease of thyroid gland     Diverticulitis     Encounter for blood transfusion     Fibromyalgia     Headache     Low back pain     Osteoporosis      Past Surgical History:   Procedure Laterality Date    CHOLECYSTECTOMY  2012    HYSTERECTOMY  2003    PORTACATH PLACEMENT N/A 7/25/2024    Procedure: INSERTION OF PORTACATH;  Surgeon: Austen Mendez MD;  Location: [facility];  Service: General;  Laterality: N/A;    SINUS SURGERY  2006     Family History   Problem Relation Age of Onset    COPD Mother     Hypertension Father     Heart disease Father        Medications: The current medication list was reviewed and reconciled.     Allergies:  is allergic to ciprofloxacin.  Review of Systems:  A comprehensive 14 point review of systems was conducted with patient and positive as per HPI otherwise negative.    Physical Exam:    Vitals:    07/31/24 0939   BP: 108/68   Pulse: 110   Resp: 18   Temp: 97.7 °F (36.5 °C)   SpO2:  97%     Vitals:    07/31/24 0939   PainSc:   2        ECOG: (0) Fully Active - Able to Carry On All Pre-disease Performance Without Restriction    General/Constitutional: Awake, alert and oriented. No apparent acute distress is noted.  HEENT: Normocephalic, atraumatic. PERRLA, conjunctiva normal. Extraocular movements are intact.  Neck: No JVD, thyromegaly or cervical lymphadenopathy.  Cardiovascular: S1, S2. Regular rate and rhythm, no murmurs, rubs or gallops.  Respiratory: Pulmonary effort is normal, lungs are clear to auscultation bilaterally. No wheezing, rhonchi or rales. No use of accessory muscles, no retractions.  Abdomen: Soft, non-tender, non-distended with normoactive bowel sounds x 4 quadrants. No palpable hepatosplenomegaly.  Lymph: No cervical, supraclavicular, axillary, inguinal or femoral adenopathy.  Integumentary: Skin warm and dry. No bruising, ecchymosis.  Extremities: No clubbing, cyanosis or edema.  Neurological: Alert and oriented x 3. Grossly non-focal examination.            NEEDS ASSESSMENTS    Genetics  The patient's new diagnosis and family history have been reviewed for genetic counseling needs. A genetic referral is not recommended.     Barriers to care  A barriers form was also completed by the patient today. We discussed services offered by our facility to help her have adequate access to care. The patient was given the name and card for our Oncology Social Worker, Alexandra Andrade. Based upon barriers assessment today, the patient will not require a follow-up call from the  to further discuss needs.     VAD Assessment  The patient and I discussed planned intervenous chemotherapy as well as other IV treatments that are often needed throughout the course of treatment. These may include, but are not limited to blood transfusions, antibiotics, and IV hydration. The vasculature does not appear to be adequate for multiple peripheral IVs throughout their treatment course. Discussed  "risks and benefits of VADs. The patient would like to pursue Port-A-Cath insertion prior to initiation of treatment. Port-a-cath was placed on 07/25/2024 per Dr. Mendez.    Advanced Care Planning  The patient and I discussed advanced care planning, \"Conversations that Matter\".   This service was offered, free of charge, for development of advance directives with a certified ACP facilitator.  The patient does not have an up-to-date advanced directive. This document is not on file with our office. The patient is not interested in an appointment with one of our facilitators to create or update their advanced directives.      Palliative Care  The patient and I discussed palliative care services. Palliative care is not the same as Hospice care. This is specialized medical care for people living with serious illness with the goal of improving quality of life for the patient and their family. [facility] has partnered with [facility] to offer our patients outpatient palliative care early along with their treatment to assist in coordination of care, symptom management, pain management, and medical decision making.  Oncology criteria for palliative care referral is not met at this time. The patient is not interested in a palliative care consultation.     Additional Referral needs  none      CHEMOTHERAPY EDUCATION    Booklets Given: Chemotherapy and You [x]  Eating Hints [x]    Sexuality/Fertility Books []      Chemotherapy/Biotherapy Education Sheets: (list all that apply)  nausea management, acid reflux management, diarrhea management, Cancer resourse contacts information, skin and mouth care, and vaccination information                                                                                                                                                                 Chemotherapy Regimen:   Treatment Plans       Name Type Plan Dates Plan Provider         Active    OP LUNG Pembrolizumab 200 mg / " PACLitaxel / CARBOplatin AUC=6 ONCOLOGY TREATMENT  7/21/2024 - Present Rosenda Cassidy MD                      TOPICS EDUCATION PROVIDED COMMENTS   ANEMIA:  role of RBC, cause, s/s, ways to manage, role of transfusion [x]    THROMBOCYTOPENIA:  role of platelet, cause, s/s, ways to prevent bleeding, things to avoid, when to seek help [x]    NEUTROPENIA:  role of WBC, cause, infection precautions, s/s of infection, when to call MD [x]    NUTRITION & APPETITE CHANGES:  importance of maintaining healthy diet & weight, ways to manage to improve intake, dietary consult, exercise regimen [x]    DIARRHEA:  causes, s/s of dehydration, ways to manage, dietary changes, when to call MD [x]    CONSTIPATION:  causes, ways to manage, dietary changes, when to call MD [x]    NAUSEA & VOMITING:  cause, use of antiemetics, dietary changes, when to call MD [x]    MOUTH SORES:  causes, oral care, ways to manage [x]    ALOPECIA:  cause, ways to manage, resources [x]    INFERTILITY & SEXUALITY:  causes, fertility preservation options, sexuality changes, ways to manage, importance of birth control [x]    NERVOUS SYSTEM CHANGES:  causes, s/s, neuropathies, cognitive changes, ways to manage [x]    PAIN:  causes, ways to manage [x]    SKIN & NAIL CHANGES:  cause, s/s, ways to manage [x]    ORGAN TOXICITIES:  cause, s/s, need for diagnostic tests, labs, when to notify MD [x]    SURVIVORSHIP:  distress, distress assessment, secondary malignancies, early/late effects, follow-up, social issues, social support [x]    HOME CARE:  use of spill kits, storing of PO chemo, how to manage bodily fluids [x]    MISCELLANEOUS:  drug interactions, administration, vesicant, et [x]      Assessment and Plan:    Diagnoses and all orders for this visit:    1. Hypokalemia (Primary)  -     Magnesium    2. Non-small cell cancer of left lung  -     OLANZapine (zyPREXA) 5 MG tablet; Take 1 tablet by mouth Every Night. Take nightly x 4 starting night of chemotherapy.   Dispense: 4 tablet; Refill: 3  -     ondansetron (ZOFRAN) 8 MG tablet; Take 1 tablet by mouth 3 (Three) Times a Day As Needed for Nausea or Vomiting.  Dispense: 30 tablet; Refill: 5    Other orders  -     prochlorperazine (COMPAZINE) 10 MG tablet; Take 1 tablet by mouth Every 6 (Six) Hours As Needed for Nausea or Vomiting.  Dispense: 30 tablet; Refill: 1  -     lidocaine-prilocaine (EMLA) 2.5-2.5 % cream; Apply to port-a-cath site 30 minutes prior to arrival at infusion center. Cover with plastic wrap.  Dispense: 30 g; Refill: 1      - Chemotherapy teaching was also completed today as documented above. Adequate time was given to answer all questions to her satisfaction. Patient and family are aware of their care team members and contact information if they have questions or problems throughout the treatment course. Needs assessments and education has been completed. The patient is adequately prepared to begin treatment as scheduled.   - Reviewed with patient education regarding EMLA cream, Compazine, and Zofran and prescriptions were sent to the pharmacy of patient's choice.  - I advised the patient that she can take Tylenol or Ibuprofen as needed for aches/pains related to cancer/treatment. I also advised patient that she could use Senakot or Miralax as needed for constipation or Imodium as needed for diarrhea.    - I reviewed with the patient the care team members. I also reviewed the option of the urgent care clinic through our oncology office for evaluation and management of symptoms related to treatment. Patient was provided with phone number to call during regular office hours [phone] press #1 and for treatment related questions call [phone] to speak to a nurse. If after hours or on the weekend call [phone] and ask to page the MD on call for [facility].   - Consent for treatment with Carboplatin/Paclitaxel/Pembrolizumab as recommended by Dr. Cassidy for the  treatment of non-small cell lung cancer was signed by the patient today.         I spent 60 minutes with Conchita Ross today.  In the office today, more than 50% of this time was spent face-to-face with her  in counseling / coordination of care, reviewing her interim medical history and counseling on the current treatment plan.  All questions were answered to her satisfaction.           Electronically Signed by: KESHAV Gallego , July 31, 2024 11:57 EDT

## 2024-08-01 ENCOUNTER — INFUSION (OUTPATIENT)
Dept: ONCOLOGY | Facility: HOSPITAL | Age: 79
End: 2024-08-01
Payer: MEDICARE

## 2024-08-01 ENCOUNTER — DOCUMENTATION (OUTPATIENT)
Dept: ONCOLOGY | Facility: CLINIC | Age: 79
End: 2024-08-01
Payer: MEDICARE

## 2024-08-01 VITALS
DIASTOLIC BLOOD PRESSURE: 66 MMHG | BODY MASS INDEX: 20.77 KG/M2 | OXYGEN SATURATION: 90 % | SYSTOLIC BLOOD PRESSURE: 103 MMHG | WEIGHT: 128.6 LBS | HEART RATE: 109 BPM | TEMPERATURE: 97.3 F | RESPIRATION RATE: 18 BRPM

## 2024-08-01 DIAGNOSIS — C34.92 NON-SMALL CELL CANCER OF LEFT LUNG: Primary | ICD-10-CM

## 2024-08-01 LAB
ALBUMIN SERPL-MCNC: 3.1 G/DL (ref 3.5–5.2)
ALBUMIN/GLOB SERPL: 0.8 G/DL
ALP SERPL-CCNC: 178 U/L (ref 39–117)
ALT SERPL W P-5'-P-CCNC: 16 U/L (ref 1–33)
ANION GAP SERPL CALCULATED.3IONS-SCNC: 9.2 MMOL/L (ref 5–15)
AST SERPL-CCNC: 30 U/L (ref 1–32)
BILIRUB SERPL-MCNC: 1.7 MG/DL (ref 0–1.2)
BUN SERPL-MCNC: 8 MG/DL (ref 8–23)
BUN/CREAT SERPL: 13.3 (ref 7–25)
CALCIUM SPEC-SCNC: 8.7 MG/DL (ref 8.6–10.5)
CHLORIDE SERPL-SCNC: 101 MMOL/L (ref 98–107)
CO2 SERPL-SCNC: 28.8 MMOL/L (ref 22–29)
CREAT SERPL-MCNC: 0.6 MG/DL (ref 0.57–1)
EGFRCR SERPLBLD CKD-EPI 2021: 92.6 ML/MIN/1.73
GLOBULIN UR ELPH-MCNC: 3.9 GM/DL
GLUCOSE BLDC GLUCOMTR-MCNC: 206 MG/DL (ref 70–130)
GLUCOSE SERPL-MCNC: 130 MG/DL (ref 65–99)
MAGNESIUM SERPL-MCNC: 2 MG/DL (ref 1.6–2.4)
POTASSIUM SERPL-SCNC: 3.1 MMOL/L (ref 3.5–5.2)
PROT SERPL-MCNC: 7 G/DL (ref 6–8.5)
SODIUM SERPL-SCNC: 139 MMOL/L (ref 136–145)

## 2024-08-01 PROCEDURE — 63710000001 POTASSIUM CHLORIDE 20 MEQ PACK

## 2024-08-01 PROCEDURE — 96413 CHEMO IV INFUSION 1 HR: CPT

## 2024-08-01 PROCEDURE — 25010000002 HEPARIN LOCK FLUSH PER 10 UNITS: Performed by: NURSE PRACTITIONER

## 2024-08-01 PROCEDURE — 25010000002 FOSAPREPITANT PER 1 MG: Performed by: INTERNAL MEDICINE

## 2024-08-01 PROCEDURE — 25010000002 PEMBROLIZUMAB 100 MG/4ML SOLUTION 4 ML VIAL: Performed by: INTERNAL MEDICINE

## 2024-08-01 PROCEDURE — 82948 REAGENT STRIP/BLOOD GLUCOSE: CPT | Performed by: PHYSICIAN ASSISTANT

## 2024-08-01 PROCEDURE — 25010000002 DEXAMETHASONE SODIUM PHOSPHATE 20 MG/5ML SOLUTION: Performed by: INTERNAL MEDICINE

## 2024-08-01 PROCEDURE — 96417 CHEMO IV INFUS EACH ADDL SEQ: CPT

## 2024-08-01 PROCEDURE — 25010000002 CARBOPLATIN PER 50 MG: Performed by: INTERNAL MEDICINE

## 2024-08-01 PROCEDURE — 25810000003 SODIUM CHLORIDE 0.9 % SOLUTION: Performed by: INTERNAL MEDICINE

## 2024-08-01 PROCEDURE — A9270 NON-COVERED ITEM OR SERVICE: HCPCS

## 2024-08-01 PROCEDURE — 25010000002 LORAZEPAM PER 2 MG

## 2024-08-01 PROCEDURE — 96415 CHEMO IV INFUSION ADDL HR: CPT

## 2024-08-01 PROCEDURE — 25010000002 PACLITAXEL PER 1 MG: Performed by: INTERNAL MEDICINE

## 2024-08-01 PROCEDURE — 25810000003 SODIUM CHLORIDE 0.9 % SOLUTION 500 ML FLEX CONT: Performed by: INTERNAL MEDICINE

## 2024-08-01 PROCEDURE — 83735 ASSAY OF MAGNESIUM: CPT

## 2024-08-01 PROCEDURE — 96375 TX/PRO/DX INJ NEW DRUG ADDON: CPT

## 2024-08-01 PROCEDURE — 25810000003 SODIUM CHLORIDE 0.9 % SOLUTION 250 ML FLEX CONT: Performed by: INTERNAL MEDICINE

## 2024-08-01 PROCEDURE — 25010000002 PALONOSETRON PER 25 MCG: Performed by: INTERNAL MEDICINE

## 2024-08-01 PROCEDURE — 80053 COMPREHEN METABOLIC PANEL: CPT

## 2024-08-01 PROCEDURE — 96367 TX/PROPH/DG ADDL SEQ IV INF: CPT

## 2024-08-01 PROCEDURE — 25010000002 DIPHENHYDRAMINE PER 50 MG: Performed by: INTERNAL MEDICINE

## 2024-08-01 RX ORDER — HEPARIN SODIUM (PORCINE) LOCK FLUSH IV SOLN 100 UNIT/ML 100 UNIT/ML
500 SOLUTION INTRAVENOUS AS NEEDED
OUTPATIENT
Start: 2024-08-01

## 2024-08-01 RX ORDER — SODIUM CHLORIDE 0.9 % (FLUSH) 0.9 %
10 SYRINGE (ML) INJECTION AS NEEDED
OUTPATIENT
Start: 2024-08-01

## 2024-08-01 RX ORDER — FAMOTIDINE 10 MG/ML
20 INJECTION, SOLUTION INTRAVENOUS ONCE
Status: COMPLETED | OUTPATIENT
Start: 2024-08-01 | End: 2024-08-01

## 2024-08-01 RX ORDER — POTASSIUM CHLORIDE 20 MEQ/1
40 TABLET, EXTENDED RELEASE ORAL ONCE
Status: DISCONTINUED | OUTPATIENT
Start: 2024-08-01 | End: 2024-08-01

## 2024-08-01 RX ORDER — HEPARIN SODIUM (PORCINE) LOCK FLUSH IV SOLN 100 UNIT/ML 100 UNIT/ML
500 SOLUTION INTRAVENOUS AS NEEDED
Status: DISCONTINUED | OUTPATIENT
Start: 2024-08-01 | End: 2024-08-01 | Stop reason: HOSPADM

## 2024-08-01 RX ORDER — LORAZEPAM 2 MG/ML
0.5 INJECTION INTRAMUSCULAR ONCE
Status: COMPLETED | OUTPATIENT
Start: 2024-08-01 | End: 2024-08-01

## 2024-08-01 RX ORDER — SODIUM CHLORIDE 9 MG/ML
20 INJECTION, SOLUTION INTRAVENOUS ONCE
Status: COMPLETED | OUTPATIENT
Start: 2024-08-01 | End: 2024-08-01

## 2024-08-01 RX ORDER — PALONOSETRON 0.05 MG/ML
0.25 INJECTION, SOLUTION INTRAVENOUS ONCE
Status: COMPLETED | OUTPATIENT
Start: 2024-08-01 | End: 2024-08-01

## 2024-08-01 RX ORDER — POTASSIUM CHLORIDE 1.5 G/1.58G
40 POWDER, FOR SOLUTION ORAL ONCE
Status: COMPLETED | OUTPATIENT
Start: 2024-08-01 | End: 2024-08-01

## 2024-08-01 RX ORDER — SODIUM CHLORIDE 0.9 % (FLUSH) 0.9 %
10 SYRINGE (ML) INJECTION AS NEEDED
Status: DISCONTINUED | OUTPATIENT
Start: 2024-08-01 | End: 2024-08-01 | Stop reason: HOSPADM

## 2024-08-01 RX ADMIN — CARBOPLATIN 260 MG: 10 INJECTION, SOLUTION INTRAVENOUS at 15:09

## 2024-08-01 RX ADMIN — Medication 500 UNITS: at 16:00

## 2024-08-01 RX ADMIN — SODIUM CHLORIDE 200 MG: 9 INJECTION, SOLUTION INTRAVENOUS at 09:50

## 2024-08-01 RX ADMIN — FAMOTIDINE 20 MG: 10 INJECTION, SOLUTION INTRAVENOUS at 09:18

## 2024-08-01 RX ADMIN — DIPHENHYDRAMINE HYDROCHLORIDE 50 MG: 50 INJECTION, SOLUTION INTRAMUSCULAR; INTRAVENOUS at 10:36

## 2024-08-01 RX ADMIN — POTASSIUM CHLORIDE 40 MEQ: 1.5 POWDER, FOR SOLUTION ORAL at 09:28

## 2024-08-01 RX ADMIN — SODIUM CHLORIDE 20 ML/HR: 9 INJECTION, SOLUTION INTRAVENOUS at 09:18

## 2024-08-01 RX ADMIN — PALONOSETRON HYDROCHLORIDE 0.25 MG: 0.25 INJECTION INTRAVENOUS at 09:20

## 2024-08-01 RX ADMIN — Medication 10 ML: at 16:00

## 2024-08-01 RX ADMIN — PACLITAXEL 165 MG: 6 INJECTION, SOLUTION INTRAVENOUS at 11:47

## 2024-08-01 RX ADMIN — SODIUM CHLORIDE 150 MG: 9 INJECTION, SOLUTION INTRAVENOUS at 10:52

## 2024-08-01 RX ADMIN — DEXAMETHASONE SODIUM PHOSPHATE 20 MG: 4 INJECTION, SOLUTION INTRA-ARTICULAR; INTRALESIONAL; INTRAMUSCULAR; INTRAVENOUS; SOFT TISSUE at 10:19

## 2024-08-01 RX ADMIN — LORAZEPAM 0.5 MG: 2 INJECTION INTRAMUSCULAR; INTRAVENOUS at 11:36

## 2024-08-01 NOTE — PROGRESS NOTES
Patient in clinic today for first chemo. SS received referral for psychosocial assessment. Patient request to be contacted at home.     Please refer to the Social Work flowsheet for assessment details.    SS provided patient with contact information and encouraged patient to call with any questions, concerns, or needs.     Patient verbalized understanding and agreed with resource assistance and plan.    SS will follow and assist as needed.

## 2024-08-02 ENCOUNTER — TELEPHONE (OUTPATIENT)
Dept: ONCOLOGY | Facility: CLINIC | Age: 79
End: 2024-08-02
Payer: MEDICARE

## 2024-08-02 ENCOUNTER — APPOINTMENT (OUTPATIENT)
Dept: GENERAL RADIOLOGY | Facility: HOSPITAL | Age: 79
DRG: 881 | End: 2024-08-02
Payer: MEDICARE

## 2024-08-02 ENCOUNTER — HOSPITAL ENCOUNTER (EMERGENCY)
Facility: HOSPITAL | Age: 79
Discharge: OTHER INSTITUTION | DRG: 881 | End: 2024-08-02
Attending: EMERGENCY MEDICINE
Payer: MEDICARE

## 2024-08-02 ENCOUNTER — HOSPITAL ENCOUNTER (INPATIENT)
Facility: HOSPITAL | Age: 79
LOS: 4 days | Discharge: HOME | DRG: 881 | End: 2024-08-06
Attending: PSYCHIATRY & NEUROLOGY | Admitting: PSYCHIATRY & NEUROLOGY
Payer: MEDICARE

## 2024-08-02 VITALS
RESPIRATION RATE: 20 BRPM | HEIGHT: 66 IN | TEMPERATURE: 97.8 F | DIASTOLIC BLOOD PRESSURE: 84 MMHG | WEIGHT: 128 LBS | BODY MASS INDEX: 20.57 KG/M2 | HEART RATE: 113 BPM | SYSTOLIC BLOOD PRESSURE: 155 MMHG | OXYGEN SATURATION: 96 %

## 2024-08-02 DIAGNOSIS — F41.9 ANXIETY: ICD-10-CM

## 2024-08-02 DIAGNOSIS — F32.A DEPRESSION, UNSPECIFIED DEPRESSION TYPE: Primary | ICD-10-CM

## 2024-08-02 PROBLEM — F32.9 MDD (MAJOR DEPRESSIVE DISORDER): Status: ACTIVE | Noted: 2024-08-02

## 2024-08-02 LAB
ALBUMIN SERPL-MCNC: 3.1 G/DL (ref 3.5–5.2)
ALBUMIN/GLOB SERPL: 0.8 G/DL
ALP SERPL-CCNC: 175 U/L (ref 39–117)
ALT SERPL W P-5'-P-CCNC: 19 U/L (ref 1–33)
AMMONIA BLD-SCNC: 33 UMOL/L (ref 11–51)
AMPHET+METHAMPHET UR QL: NEGATIVE
AMPHETAMINES UR QL: NEGATIVE
ANION GAP SERPL CALCULATED.3IONS-SCNC: 13.8 MMOL/L (ref 5–15)
AST SERPL-CCNC: 41 U/L (ref 1–32)
BARBITURATES UR QL SCN: NEGATIVE
BASOPHILS # BLD AUTO: 0.01 10*3/MM3 (ref 0–0.2)
BASOPHILS NFR BLD AUTO: 0.1 % (ref 0–1.5)
BENZODIAZ UR QL SCN: NEGATIVE
BILIRUB SERPL-MCNC: 2.4 MG/DL (ref 0–1.2)
BILIRUB UR QL STRIP: NEGATIVE
BUN SERPL-MCNC: 11 MG/DL (ref 8–23)
BUN/CREAT SERPL: 17.5 (ref 7–25)
BUPRENORPHINE SERPL-MCNC: NEGATIVE NG/ML
CALCIUM SPEC-SCNC: 8.3 MG/DL (ref 8.6–10.5)
CANNABINOIDS SERPL QL: NEGATIVE
CHLORIDE SERPL-SCNC: 105 MMOL/L (ref 98–107)
CLARITY UR: CLEAR
CO2 SERPL-SCNC: 22.2 MMOL/L (ref 22–29)
COCAINE UR QL: NEGATIVE
COLOR UR: YELLOW
CREAT SERPL-MCNC: 0.63 MG/DL (ref 0.57–1)
CRP SERPL-MCNC: 2.87 MG/DL (ref 0–0.5)
DEPRECATED RDW RBC AUTO: 48.6 FL (ref 37–54)
EGFRCR SERPLBLD CKD-EPI 2021: 91.5 ML/MIN/1.73
EOSINOPHIL # BLD AUTO: 0 10*3/MM3 (ref 0–0.4)
EOSINOPHIL NFR BLD AUTO: 0 % (ref 0.3–6.2)
ERYTHROCYTE [DISTWIDTH] IN BLOOD BY AUTOMATED COUNT: 15 % (ref 12.3–15.4)
FENTANYL UR-MCNC: NEGATIVE NG/ML
GEN 5 2HR TROPONIN T REFLEX: <6 NG/L
GLOBULIN UR ELPH-MCNC: 3.7 GM/DL
GLUCOSE BLDC GLUCOMTR-MCNC: 258 MG/DL (ref 70–130)
GLUCOSE BLDC GLUCOMTR-MCNC: 294 MG/DL (ref 70–130)
GLUCOSE SERPL-MCNC: 248 MG/DL (ref 65–99)
GLUCOSE UR STRIP-MCNC: ABNORMAL MG/DL
HAV IGM SERPL QL IA: NORMAL
HBV CORE IGM SERPL QL IA: NORMAL
HBV SURFACE AG SERPL QL IA: NORMAL
HCT VFR BLD AUTO: 32.1 % (ref 34–46.6)
HCV AB SER QL: NORMAL
HGB BLD-MCNC: 9.7 G/DL (ref 12–15.9)
HGB UR QL STRIP.AUTO: NEGATIVE
HOLD SPECIMEN: NORMAL
HOLD SPECIMEN: NORMAL
IMM GRANULOCYTES # BLD AUTO: 0.12 10*3/MM3 (ref 0–0.05)
IMM GRANULOCYTES NFR BLD AUTO: 1.7 % (ref 0–0.5)
KETONES UR QL STRIP: NEGATIVE
LEUKOCYTE ESTERASE UR QL STRIP.AUTO: NEGATIVE
LIPASE SERPL-CCNC: 11 U/L (ref 13–60)
LYMPHOCYTES # BLD AUTO: 0.25 10*3/MM3 (ref 0.7–3.1)
LYMPHOCYTES NFR BLD AUTO: 3.5 % (ref 19.6–45.3)
MCH RBC QN AUTO: 26.8 PG (ref 26.6–33)
MCHC RBC AUTO-ENTMCNC: 30.2 G/DL (ref 31.5–35.7)
MCV RBC AUTO: 88.7 FL (ref 79–97)
METHADONE UR QL SCN: NEGATIVE
MONOCYTES # BLD AUTO: 0.35 10*3/MM3 (ref 0.1–0.9)
MONOCYTES NFR BLD AUTO: 4.9 % (ref 5–12)
NEUTROPHILS NFR BLD AUTO: 6.35 10*3/MM3 (ref 1.7–7)
NEUTROPHILS NFR BLD AUTO: 89.8 % (ref 42.7–76)
NITRITE UR QL STRIP: NEGATIVE
NRBC BLD AUTO-RTO: 0 /100 WBC (ref 0–0.2)
NT-PROBNP SERPL-MCNC: 284.3 PG/ML (ref 0–1800)
OPIATES UR QL: NEGATIVE
OXYCODONE UR QL SCN: NEGATIVE
PCP UR QL SCN: NEGATIVE
PH UR STRIP.AUTO: 7 [PH] (ref 5–8)
PLATELET # BLD AUTO: 170 10*3/MM3 (ref 140–450)
PMV BLD AUTO: 10.8 FL (ref 6–12)
POTASSIUM SERPL-SCNC: 3.9 MMOL/L (ref 3.5–5.2)
PROT SERPL-MCNC: 6.8 G/DL (ref 6–8.5)
PROT UR QL STRIP: NEGATIVE
QT INTERVAL: 434 MS
QTC INTERVAL: 610 MS
RBC # BLD AUTO: 3.62 10*6/MM3 (ref 3.77–5.28)
SODIUM SERPL-SCNC: 141 MMOL/L (ref 136–145)
SP GR UR STRIP: 1.01 (ref 1–1.03)
TRICYCLICS UR QL SCN: POSITIVE
TROPONIN T DELTA: NORMAL
TROPONIN T SERPL HS-MCNC: <6 NG/L
UROBILINOGEN UR QL STRIP: ABNORMAL
WBC NRBC COR # BLD AUTO: 7.08 10*3/MM3 (ref 3.4–10.8)
WHOLE BLOOD HOLD COAG: NORMAL
WHOLE BLOOD HOLD SPECIMEN: NORMAL

## 2024-08-02 PROCEDURE — 80074 ACUTE HEPATITIS PANEL: CPT | Performed by: PSYCHIATRY & NEUROLOGY

## 2024-08-02 PROCEDURE — 80053 COMPREHEN METABOLIC PANEL: CPT | Performed by: NURSE PRACTITIONER

## 2024-08-02 PROCEDURE — 63710000001 INSULIN GLARGINE PER 5 UNITS: Performed by: PSYCHIATRY & NEUROLOGY

## 2024-08-02 PROCEDURE — 83690 ASSAY OF LIPASE: CPT | Performed by: NURSE PRACTITIONER

## 2024-08-02 PROCEDURE — 84484 ASSAY OF TROPONIN QUANT: CPT | Performed by: EMERGENCY MEDICINE

## 2024-08-02 PROCEDURE — 63710000001 INSULIN LISPRO (HUMAN) PER 5 UNITS: Performed by: PSYCHIATRY & NEUROLOGY

## 2024-08-02 PROCEDURE — 82948 REAGENT STRIP/BLOOD GLUCOSE: CPT

## 2024-08-02 PROCEDURE — 36415 COLL VENOUS BLD VENIPUNCTURE: CPT

## 2024-08-02 PROCEDURE — 71045 X-RAY EXAM CHEST 1 VIEW: CPT | Performed by: RADIOLOGY

## 2024-08-02 PROCEDURE — 71045 X-RAY EXAM CHEST 1 VIEW: CPT

## 2024-08-02 PROCEDURE — 99285 EMERGENCY DEPT VISIT HI MDM: CPT

## 2024-08-02 PROCEDURE — 86140 C-REACTIVE PROTEIN: CPT | Performed by: NURSE PRACTITIONER

## 2024-08-02 PROCEDURE — 93010 ELECTROCARDIOGRAM REPORT: CPT | Performed by: INTERNAL MEDICINE

## 2024-08-02 PROCEDURE — 81003 URINALYSIS AUTO W/O SCOPE: CPT | Performed by: NURSE PRACTITIONER

## 2024-08-02 PROCEDURE — 80307 DRUG TEST PRSMV CHEM ANLYZR: CPT | Performed by: NURSE PRACTITIONER

## 2024-08-02 PROCEDURE — 93005 ELECTROCARDIOGRAM TRACING: CPT | Performed by: EMERGENCY MEDICINE

## 2024-08-02 PROCEDURE — 82140 ASSAY OF AMMONIA: CPT | Performed by: NURSE PRACTITIONER

## 2024-08-02 PROCEDURE — 85025 COMPLETE CBC W/AUTO DIFF WBC: CPT | Performed by: NURSE PRACTITIONER

## 2024-08-02 PROCEDURE — 83880 ASSAY OF NATRIURETIC PEPTIDE: CPT | Performed by: NURSE PRACTITIONER

## 2024-08-02 RX ORDER — HYDROXYZINE HYDROCHLORIDE 25 MG/1
25 TABLET, FILM COATED ORAL ONCE
Status: COMPLETED | OUTPATIENT
Start: 2024-08-02 | End: 2024-08-02

## 2024-08-02 RX ORDER — ONDANSETRON 4 MG/1
4 TABLET, ORALLY DISINTEGRATING ORAL EVERY 6 HOURS PRN
Status: DISCONTINUED | OUTPATIENT
Start: 2024-08-02 | End: 2024-08-03

## 2024-08-02 RX ORDER — SODIUM CHLORIDE 0.9 % (FLUSH) 0.9 %
10 SYRINGE (ML) INJECTION AS NEEDED
Status: DISCONTINUED | OUTPATIENT
Start: 2024-08-02 | End: 2024-08-02 | Stop reason: HOSPADM

## 2024-08-02 RX ORDER — LACTULOSE 10 G/15ML
20 SOLUTION ORAL 3 TIMES DAILY
Status: DISCONTINUED | OUTPATIENT
Start: 2024-08-02 | End: 2024-08-06 | Stop reason: HOSPADM

## 2024-08-02 RX ORDER — ALUMINA, MAGNESIA, AND SIMETHICONE 2400; 2400; 240 MG/30ML; MG/30ML; MG/30ML
15 SUSPENSION ORAL EVERY 6 HOURS PRN
Status: DISCONTINUED | OUTPATIENT
Start: 2024-08-02 | End: 2024-08-06 | Stop reason: HOSPADM

## 2024-08-02 RX ORDER — BISACODYL 5 MG/1
5 TABLET, DELAYED RELEASE ORAL DAILY PRN
Status: DISCONTINUED | OUTPATIENT
Start: 2024-08-02 | End: 2024-08-06 | Stop reason: HOSPADM

## 2024-08-02 RX ORDER — ALBUTEROL SULFATE 2.5 MG/3ML
2.5 SOLUTION RESPIRATORY (INHALATION) EVERY 4 HOURS PRN
Status: ON HOLD | COMMUNITY

## 2024-08-02 RX ORDER — NICOTINE POLACRILEX 4 MG
15 LOZENGE BUCCAL
Status: DISCONTINUED | OUTPATIENT
Start: 2024-08-02 | End: 2024-08-06 | Stop reason: HOSPADM

## 2024-08-02 RX ORDER — LACTULOSE 10 G/15ML
20 SOLUTION ORAL 3 TIMES DAILY
Status: CANCELLED | OUTPATIENT
Start: 2024-08-02

## 2024-08-02 RX ORDER — ALBUTEROL SULFATE 2.5 MG/3ML
2.5 SOLUTION RESPIRATORY (INHALATION) EVERY 4 HOURS PRN
Status: CANCELLED | OUTPATIENT
Start: 2024-08-02

## 2024-08-02 RX ORDER — IBUPROFEN 400 MG/1
400 TABLET ORAL EVERY 6 HOURS PRN
Status: DISCONTINUED | OUTPATIENT
Start: 2024-08-02 | End: 2024-08-06 | Stop reason: HOSPADM

## 2024-08-02 RX ORDER — SPIRONOLACTONE 25 MG/1
25 TABLET ORAL 2 TIMES DAILY
Status: ON HOLD | COMMUNITY

## 2024-08-02 RX ORDER — ALBUTEROL SULFATE 90 UG/1
2 AEROSOL, METERED RESPIRATORY (INHALATION) EVERY 4 HOURS PRN
Status: ON HOLD | COMMUNITY

## 2024-08-02 RX ORDER — TRAMADOL HYDROCHLORIDE 50 MG/1
50 TABLET ORAL EVERY 12 HOURS PRN
Status: DISCONTINUED | OUTPATIENT
Start: 2024-08-02 | End: 2024-08-06 | Stop reason: HOSPADM

## 2024-08-02 RX ORDER — MONTELUKAST SODIUM 10 MG/1
10 TABLET ORAL NIGHTLY
Status: ON HOLD | COMMUNITY

## 2024-08-02 RX ORDER — CETIRIZINE HYDROCHLORIDE 10 MG/1
10 TABLET ORAL DAILY
Status: CANCELLED | OUTPATIENT
Start: 2024-08-02

## 2024-08-02 RX ORDER — ACETAMINOPHEN 325 MG/1
650 TABLET ORAL EVERY 6 HOURS PRN
Status: DISCONTINUED | OUTPATIENT
Start: 2024-08-02 | End: 2024-08-06 | Stop reason: HOSPADM

## 2024-08-02 RX ORDER — PROCHLORPERAZINE MALEATE 5 MG/1
5 TABLET ORAL EVERY 6 HOURS PRN
Status: DISCONTINUED | OUTPATIENT
Start: 2024-08-02 | End: 2024-08-03

## 2024-08-02 RX ORDER — ONDANSETRON 4 MG/1
8 TABLET, FILM COATED ORAL 3 TIMES DAILY PRN
Status: CANCELLED | OUTPATIENT
Start: 2024-08-02

## 2024-08-02 RX ORDER — GLUCAGON 1 MG/ML
1 KIT INJECTION
Status: DISCONTINUED | OUTPATIENT
Start: 2024-08-02 | End: 2024-08-06 | Stop reason: HOSPADM

## 2024-08-02 RX ORDER — CETIRIZINE HYDROCHLORIDE 10 MG/1
5 TABLET ORAL DAILY
Status: DISCONTINUED | OUTPATIENT
Start: 2024-08-02 | End: 2024-08-06 | Stop reason: HOSPADM

## 2024-08-02 RX ORDER — OLANZAPINE 5 MG/1
5 TABLET ORAL NIGHTLY
Status: CANCELLED | OUTPATIENT
Start: 2024-08-02 | End: 2024-08-05

## 2024-08-02 RX ORDER — TRAZODONE HYDROCHLORIDE 50 MG/1
12.5 TABLET ORAL NIGHTLY PRN
Status: ACTIVE | OUTPATIENT
Start: 2024-08-02 | End: 2024-08-04

## 2024-08-02 RX ORDER — INSULIN LISPRO 100 [IU]/ML
3-14 INJECTION, SOLUTION INTRAVENOUS; SUBCUTANEOUS
Status: DISCONTINUED | OUTPATIENT
Start: 2024-08-02 | End: 2024-08-06 | Stop reason: HOSPADM

## 2024-08-02 RX ORDER — PANTOPRAZOLE SODIUM 40 MG/1
40 TABLET, DELAYED RELEASE ORAL DAILY
Status: CANCELLED | OUTPATIENT
Start: 2024-08-02

## 2024-08-02 RX ORDER — FUROSEMIDE 40 MG/1
20 TABLET ORAL DAILY
Status: CANCELLED | OUTPATIENT
Start: 2024-08-02

## 2024-08-02 RX ORDER — LOPERAMIDE HYDROCHLORIDE 2 MG/1
2 CAPSULE ORAL
Status: DISCONTINUED | OUTPATIENT
Start: 2024-08-02 | End: 2024-08-06 | Stop reason: HOSPADM

## 2024-08-02 RX ORDER — TRAMADOL HYDROCHLORIDE 50 MG/1
50 TABLET ORAL DAILY PRN
Status: CANCELLED | OUTPATIENT
Start: 2024-08-02

## 2024-08-02 RX ORDER — POTASSIUM CHLORIDE 20 MEQ/1
20 TABLET, EXTENDED RELEASE ORAL 2 TIMES DAILY
Status: CANCELLED | OUTPATIENT
Start: 2024-08-02 | End: 2024-08-04

## 2024-08-02 RX ORDER — PROCHLORPERAZINE MALEATE 10 MG
10 TABLET ORAL EVERY 6 HOURS PRN
Status: CANCELLED | OUTPATIENT
Start: 2024-08-02

## 2024-08-02 RX ORDER — FAMOTIDINE 40 MG/1
40 TABLET, FILM COATED ORAL NIGHTLY
Status: ON HOLD | COMMUNITY

## 2024-08-02 RX ORDER — ECHINACEA PURPUREA EXTRACT 125 MG
2 TABLET ORAL AS NEEDED
Status: DISCONTINUED | OUTPATIENT
Start: 2024-08-02 | End: 2024-08-06 | Stop reason: HOSPADM

## 2024-08-02 RX ORDER — DEXTROSE MONOHYDRATE 25 G/50ML
25 INJECTION, SOLUTION INTRAVENOUS
Status: DISCONTINUED | OUTPATIENT
Start: 2024-08-02 | End: 2024-08-06 | Stop reason: HOSPADM

## 2024-08-02 RX ORDER — DOXEPIN HYDROCHLORIDE 25 MG/1
50 CAPSULE ORAL NIGHTLY
Status: CANCELLED | OUTPATIENT
Start: 2024-08-02

## 2024-08-02 RX ORDER — LEVOTHYROXINE SODIUM 0.07 MG/1
75 TABLET ORAL
Status: CANCELLED | OUTPATIENT
Start: 2024-08-03

## 2024-08-02 RX ORDER — TRAMADOL HYDROCHLORIDE 50 MG/1
50 TABLET ORAL DAILY PRN
Status: ON HOLD | COMMUNITY

## 2024-08-02 RX ORDER — POTASSIUM CHLORIDE 20 MEQ/1
20 TABLET, EXTENDED RELEASE ORAL 2 TIMES DAILY
Status: COMPLETED | OUTPATIENT
Start: 2024-08-02 | End: 2024-08-03

## 2024-08-02 RX ORDER — PANTOPRAZOLE SODIUM 40 MG/1
40 TABLET, DELAYED RELEASE ORAL DAILY
Status: ON HOLD | COMMUNITY

## 2024-08-02 RX ORDER — INSULIN DEGLUDEC 100 U/ML
8 INJECTION, SOLUTION SUBCUTANEOUS NIGHTLY
Status: ON HOLD | COMMUNITY

## 2024-08-02 RX ORDER — BUDESONIDE, GLYCOPYRROLATE, AND FORMOTEROL FUMARATE 160; 9; 4.8 UG/1; UG/1; UG/1
2 AEROSOL, METERED RESPIRATORY (INHALATION) 2 TIMES DAILY
Status: ON HOLD | COMMUNITY

## 2024-08-02 RX ORDER — DOXEPIN HYDROCHLORIDE 50 MG/1
50 CAPSULE ORAL NIGHTLY
Status: ON HOLD | COMMUNITY

## 2024-08-02 RX ORDER — FAMOTIDINE 20 MG/1
40 TABLET, FILM COATED ORAL NIGHTLY
Status: CANCELLED | OUTPATIENT
Start: 2024-08-02

## 2024-08-02 RX ORDER — DOXEPIN HYDROCHLORIDE 25 MG/1
50 CAPSULE ORAL NIGHTLY
Status: DISCONTINUED | OUTPATIENT
Start: 2024-08-02 | End: 2024-08-06 | Stop reason: HOSPADM

## 2024-08-02 RX ORDER — BENZONATATE 100 MG/1
100 CAPSULE ORAL 3 TIMES DAILY PRN
Status: DISCONTINUED | OUTPATIENT
Start: 2024-08-02 | End: 2024-08-06 | Stop reason: HOSPADM

## 2024-08-02 RX ORDER — LEVOTHYROXINE SODIUM 0.07 MG/1
75 TABLET ORAL DAILY
Status: DISCONTINUED | OUTPATIENT
Start: 2024-08-02 | End: 2024-08-06 | Stop reason: HOSPADM

## 2024-08-02 RX ADMIN — LACTULOSE 20 G: 20 SOLUTION ORAL at 21:15

## 2024-08-02 RX ADMIN — LEVOTHYROXINE SODIUM 75 MCG: 0.07 TABLET ORAL at 21:11

## 2024-08-02 RX ADMIN — CETIRIZINE HYDROCHLORIDE 5 MG: 10 TABLET, FILM COATED ORAL at 21:12

## 2024-08-02 RX ADMIN — POTASSIUM CHLORIDE 20 MEQ: 1500 TABLET, EXTENDED RELEASE ORAL at 21:12

## 2024-08-02 RX ADMIN — INSULIN LISPRO 8 UNITS: 100 INJECTION, SOLUTION INTRAVENOUS; SUBCUTANEOUS at 21:09

## 2024-08-02 RX ADMIN — HYDROXYZINE HYDROCHLORIDE 25 MG: 25 TABLET, FILM COATED ORAL at 16:23

## 2024-08-02 RX ADMIN — DOXEPIN HYDROCHLORIDE 50 MG: 25 CAPSULE ORAL at 21:12

## 2024-08-02 RX ADMIN — TRAMADOL HYDROCHLORIDE 50 MG: 50 TABLET, COATED ORAL at 22:08

## 2024-08-02 RX ADMIN — INSULIN GLARGINE 8 UNITS: 100 INJECTION, SOLUTION SUBCUTANEOUS at 21:15

## 2024-08-02 NOTE — ED PROVIDER NOTES
Subjective   History of Present Illness  Patient is a 78-year-old female with past medical history significant for squamous cell carcinoma of the lung, asthma, arthritis, CHF, cirrhosis, COPD, diabetes, thyroid disease, and fibromyalgia.  She presents to the ED today with complaints of shortness of breath, chest discomfort, increase in anxiety and agitation. The patient has been recently diagnosed with non-small cell lung cancer and began treatment with Carboplatin/Taxol/Pembrolizumab yesterday.  She states since coming home after the treatment yesterday she has been very upset and has had a lot of ups and downs with her emotions.  She states that she feels like something is wrong with her and states that she is afraid that if she goes home today she may end up killing herself.  Her son states that she has had a lot of agitation.  She states that she is not sleeping well and not eating well.        Review of Systems   Constitutional:  Positive for activity change, appetite change and fatigue. Negative for fever.   HENT: Negative.     Eyes: Negative.    Respiratory: Negative.     Cardiovascular:  Positive for palpitations. Negative for chest pain.   Gastrointestinal: Negative.  Negative for abdominal pain.   Endocrine: Negative.    Genitourinary: Negative.  Negative for dysuria.   Musculoskeletal: Negative.    Skin: Negative.    Allergic/Immunologic: Negative.    Neurological: Negative.    Hematological: Negative.    Psychiatric/Behavioral:  Positive for agitation, sleep disturbance and suicidal ideas. The patient is nervous/anxious.    All other systems reviewed and are negative.      Past Medical History:   Diagnosis Date    Anemia     Arthritis     Asthma     Cancer     squamous cell carinoma lung Left    CHF (congestive heart failure)     Cirrhosis     COPD (chronic obstructive pulmonary disease)     Diabetes mellitus     Disease of thyroid gland     Diverticulitis     Encounter for blood transfusion      Fibromyalgia     Headache     Low back pain     Osteoporosis        Allergies   Allergen Reactions    Ciprofloxacin Nausea Only, Rash and Other (See Comments)     Unknown-flu symptoms    Sertraline Other (See Comments)       Past Surgical History:   Procedure Laterality Date    CHOLECYSTECTOMY  2012    HYSTERECTOMY  2003    PORTACATH PLACEMENT N/A 7/25/2024    Procedure: INSERTION OF PORTACATH;  Surgeon: Austen Mendez MD;  Location: [facility];  Service: General;  Laterality: N/A;    SINUS SURGERY  2006       Family History   Problem Relation Age of Onset    COPD Mother     Hypertension Father     Heart disease Father        Social History     Socioeconomic History    Marital status:    Tobacco Use    Smoking status: Former     Current packs/day: 0.25     Average packs/day: 0.3 packs/day for 10.6 years (2.6 ttl pk-yrs)     Types: Cigarettes     Start date: 2014     Passive exposure: Past    Smokeless tobacco: Never   Vaping Use    Vaping status: Never Used   Substance and Sexual Activity    Alcohol use: No    Drug use: No    Sexual activity: Defer           Objective   Physical Exam  Vitals and nursing note reviewed.   Constitutional:       General: She is not in acute distress.     Appearance: She is well-developed. She is not diaphoretic.   HENT:      Head: Normocephalic and atraumatic.      Right Ear: External ear normal.      Left Ear: External ear normal.      Nose: Nose normal.   Eyes:      Conjunctiva/sclera: Conjunctivae normal.      Pupils: Pupils are equal, round, and reactive to light.   Neck:      Vascular: No JVD.      Trachea: No tracheal deviation.   Cardiovascular:      Rate and Rhythm: Normal rate and regular rhythm.      Heart sounds: Normal heart sounds. No murmur heard.  Pulmonary:      Effort: Pulmonary effort is normal. No respiratory distress.      Breath sounds: Normal breath sounds. No wheezing.   Abdominal:      General: Bowel sounds are normal.      Palpations: Abdomen is  soft.      Tenderness: There is no abdominal tenderness.   Musculoskeletal:         General: No deformity. Normal range of motion.      Cervical back: Normal range of motion and neck supple.   Skin:     General: Skin is warm and dry.      Coloration: Skin is not pale.      Findings: No erythema or rash.   Neurological:      General: No focal deficit present.      Mental Status: She is alert and oriented to person, place, and time.      Cranial Nerves: No cranial nerve deficit.   Psychiatric:         Mood and Affect: Mood is anxious.         Behavior: Behavior normal. Behavior is agitated.         Thought Content: Thought content normal.         Procedures       Results for orders placed or performed during the hospital encounter of 08/02/24   High Sensitivity Troponin T    Specimen: Arm, Right; Blood   Result Value Ref Range    HS Troponin T <6 <14 ng/L   Comprehensive Metabolic Panel    Specimen: Arm, Left; Blood   Result Value Ref Range    Glucose 248 (H) 65 - 99 mg/dL    BUN 11 8 - 23 mg/dL    Creatinine 0.63 0.57 - 1.00 mg/dL    Sodium 141 136 - 145 mmol/L    Potassium 3.9 3.5 - 5.2 mmol/L    Chloride 105 98 - 107 mmol/L    CO2 22.2 22.0 - 29.0 mmol/L    Calcium 8.3 (L) 8.6 - 10.5 mg/dL    Total Protein 6.8 6.0 - 8.5 g/dL    Albumin 3.1 (L) 3.5 - 5.2 g/dL    ALT (SGPT) 19 1 - 33 U/L    AST (SGOT) 41 (H) 1 - 32 U/L    Alkaline Phosphatase 175 (H) 39 - 117 U/L    Total Bilirubin 2.4 (H) 0.0 - 1.2 mg/dL    Globulin 3.7 gm/dL    A/G Ratio 0.8 g/dL    BUN/Creatinine Ratio 17.5 7.0 - 25.0    Anion Gap 13.8 5.0 - 15.0 mmol/L    eGFR 91.5 >60.0 mL/min/1.73   Lipase    Specimen: Arm, Left; Blood   Result Value Ref Range    Lipase 11 (L) 13 - 60 U/L   BNP    Specimen: Arm, Left; Blood   Result Value Ref Range    proBNP 284.3 0.0 - 1,800.0 pg/mL   C-reactive Protein    Specimen: Arm, Left; Blood   Result Value Ref Range    C-Reactive Protein 2.87 (H) 0.00 - 0.50 mg/dL   Ammonia    Specimen: Arm, Left; Blood   Result Value  Ref Range    Ammonia 33 11 - 51 umol/L   CBC Auto Differential    Specimen: Arm, Left; Blood   Result Value Ref Range    WBC 7.08 3.40 - 10.80 10*3/mm3    RBC 3.62 (L) 3.77 - 5.28 10*6/mm3    Hemoglobin 9.7 (L) 12.0 - 15.9 g/dL    Hematocrit 32.1 (L) 34.0 - 46.6 %    MCV 88.7 79.0 - 97.0 fL    MCH 26.8 26.6 - 33.0 pg    MCHC 30.2 (L) 31.5 - 35.7 g/dL    RDW 15.0 12.3 - 15.4 %    RDW-SD 48.6 37.0 - 54.0 fl    MPV 10.8 6.0 - 12.0 fL    Platelets 170 140 - 450 10*3/mm3    Neutrophil % 89.8 (H) 42.7 - 76.0 %    Lymphocyte % 3.5 (L) 19.6 - 45.3 %    Monocyte % 4.9 (L) 5.0 - 12.0 %    Eosinophil % 0.0 (L) 0.3 - 6.2 %    Basophil % 0.1 0.0 - 1.5 %    Immature Grans % 1.7 (H) 0.0 - 0.5 %    Neutrophils, Absolute 6.35 1.70 - 7.00 10*3/mm3    Lymphocytes, Absolute 0.25 (L) 0.70 - 3.10 10*3/mm3    Monocytes, Absolute 0.35 0.10 - 0.90 10*3/mm3    Eosinophils, Absolute 0.00 0.00 - 0.40 10*3/mm3    Basophils, Absolute 0.01 0.00 - 0.20 10*3/mm3    Immature Grans, Absolute 0.12 (H) 0.00 - 0.05 10*3/mm3    nRBC 0.0 0.0 - 0.2 /100 WBC   High Sensitivity Troponin T 2Hr    Specimen: Arm, Left; Blood   Result Value Ref Range    HS Troponin T <6 <14 ng/L    Troponin T Delta     ECG 12 Lead Dyspnea   Result Value Ref Range    QT Interval 434 ms    QTC Interval 610 ms   Green Top (Gel)   Result Value Ref Range    Extra Tube Hold for add-ons.    Lavender Top   Result Value Ref Range    Extra Tube hold for add-on    Gold Top - SST   Result Value Ref Range    Extra Tube Hold for add-ons.    Light Blue Top   Result Value Ref Range    Extra Tube Hold for add-ons.       XR Chest 1 View   Final Result   No radiographic evidence of acute cardiac or pulmonary disease.               This report was finalized on 8/2/2024 2:08 PM by Dr. Yvon Kam MD.               ED Course  ED Course as of 08/02/24 1744   Fri Aug 02, 2024   1617 ECG 12 Lead Dyspnea  Sinus tachycardia with short MO interval.  Rate 119.  Normal axis.  Prolonged QT interval.   Diffuse low voltage.  No acute ischemic changes.  Abnormal EKG. [BC]   1744 Pt to be admitted to [facility]   [MB]      ED Course User Index  [BC] Pedro Dodge MD  [MB] Maxine De La Vega APRN                                             Medical Decision Making  Patient is a 78-year-old female with past medical history significant for squamous cell carcinoma of the lung, asthma, arthritis, CHF, cirrhosis, COPD, diabetes, thyroid disease, and fibromyalgia.  She presents to the ED today with complaints of shortness of breath, chest discomfort, increase in anxiety and agitation. The patient has been recently diagnosed with non-small cell lung cancer and began treatment with Carboplatin/Taxol/Pembrolizumab yesterday.  She states since coming home after the treatment yesterday she has been very upset and has had a lot of ups and downs with her emotions.  She states that she feels like something is wrong with her and states that she is afraid that if she goes home today she may end up killing herself.  Her son states that she has had a lot of agitation.  She states that she is not sleeping well and not eating well.    Problems Addressed:  Anxiety: complicated acute illness or injury  Depression, unspecified depression type: complicated acute illness or injury    Amount and/or Complexity of Data Reviewed  Labs: ordered.  Radiology: ordered.  ECG/medicine tests: ordered. Decision-making details documented in ED Course.    Risk  Prescription drug management.        Final diagnoses:   Depression, unspecified depression type   Anxiety       ED Disposition  ED Disposition       ED Disposition   DC/Transfer to Behavioral Health Condition   Stable    Comment   --               No follow-up provider specified.       Medication List      No changes were made to your prescriptions during this visit.            Maxine De La Vega APRN  08/02/24 [id number]

## 2024-08-02 NOTE — NURSING NOTE
Presented pt to Dr. Ayala and all abnormal labs. New order to admit patient to [facility] SP[facility]. Order for 3L of 02 PRN, and order for pt to use walker PRN per Dr. Ayala. [id number].

## 2024-08-02 NOTE — TELEPHONE ENCOUNTER
"RN spoke with patient who was referring to KESHAV Meyers in General Surgery when she said \"the man who came in after the doctor\" who referred her to cardiology for dizziness. While she was at port consult after visit with Dr. Cassidy, she had mentioned having some symptoms which warranted KESHAV to refer her to cardiology. With that, the patient voiced that she has not been called with an appointment. She voiced that she was \"having a hard time this morning\" and when RN asked about symptoms she reported that she has been weak and dizzy but her \"blood sugar and blood pressure is good.\" When RN told her she needed to come in to be seen because this may be treatment-related she refused and stated she would only go see her cardiologist, Dr. Avila, today. Patient asked if RN could call their office to see if she could get an appointment today \"because she is on Medicare and cannot make her own appointments.\" With that, RN called Dr. Avila's office who reported the patient has an appointment on 8/22 at 2:00 pm and that they cannot do same day appointment since patient has not been seen since 2021. RN called the patient back and relayed this information to the patient and encouraged her to go to ED for further workup and evaluation. During this conversation, she reported she is having chest discomfort and RN reiterated that she needed to go to ED immediately. She said she would get up and get ready and verbalized she has a ride to safely make it to ED.  She was agreeable to plan of care.     RN, then, called patient's son and informed him of conversation and he is about to go to patient's house to check on her and to bring her to ED.   "

## 2024-08-02 NOTE — TELEPHONE ENCOUNTER
Pt called to find out why the male that came into her exam room after her appointment with Marcella had not made her an appointment with her regular heart doctor yet. She advised she was feeling very dizzy and weak and thought her blood was low but just needed to go to her heart doctor and wanted to talk to someone that would know about her heart doctor appointments. Pt seemed very confused, transferred patient to nurse Malone.

## 2024-08-02 NOTE — NURSING NOTE
"Patient reports having thoughts of wanting to die. She states, \"I just feel so lonely and I just want to go to sleep and not wake up. I'm ready to die.\" She denies a specific plan. She denies hi and avh. Patient is very tearful during assessment. She reports she feels like she has nobody because her family never comes to see her. She also reports her health is in really bad shape and she isn't able to take care of herself like she wants to. Patient reports she was diagnosed with lung cancer and had her first chemotherapy treatment yesterday. She reports she can't sleep and hasn't been able to eat much. She rates anxiety and depression 9/10. Patient states, \"Tete always been told depression was just a weakness and you had to take care of yourself, I guess I have been depressed for a long time but I can't do it anymore on my own. I need help.\"  "

## 2024-08-03 LAB
CHOLEST SERPL-MCNC: 105 MG/DL (ref 0–200)
GLUCOSE BLDC GLUCOMTR-MCNC: 146 MG/DL (ref 70–130)
GLUCOSE BLDC GLUCOMTR-MCNC: 195 MG/DL (ref 70–130)
GLUCOSE BLDC GLUCOMTR-MCNC: 229 MG/DL (ref 70–130)
GLUCOSE BLDC GLUCOMTR-MCNC: 265 MG/DL (ref 70–130)
HBA1C MFR BLD: 6.2 % (ref 4.8–5.6)
HDLC SERPL-MCNC: 60 MG/DL (ref 40–60)
LDLC SERPL CALC-MCNC: 33 MG/DL (ref 0–100)
LDLC/HDLC SERPL: 0.59 {RATIO}
TRIGL SERPL-MCNC: 48 MG/DL (ref 0–150)
VLDLC SERPL-MCNC: 12 MG/DL (ref 5–40)

## 2024-08-03 PROCEDURE — 93010 ELECTROCARDIOGRAM REPORT: CPT | Performed by: INTERNAL MEDICINE

## 2024-08-03 PROCEDURE — 83036 HEMOGLOBIN GLYCOSYLATED A1C: CPT | Performed by: PSYCHIATRY & NEUROLOGY

## 2024-08-03 PROCEDURE — 80061 LIPID PANEL: CPT | Performed by: PSYCHIATRY & NEUROLOGY

## 2024-08-03 PROCEDURE — 63710000001 INSULIN LISPRO (HUMAN) PER 5 UNITS: Performed by: PSYCHIATRY & NEUROLOGY

## 2024-08-03 PROCEDURE — 82948 REAGENT STRIP/BLOOD GLUCOSE: CPT

## 2024-08-03 PROCEDURE — 99223 1ST HOSP IP/OBS HIGH 75: CPT | Performed by: PSYCHIATRY & NEUROLOGY

## 2024-08-03 PROCEDURE — 94664 DEMO&/EVAL PT USE INHALER: CPT

## 2024-08-03 PROCEDURE — 63710000001 INSULIN GLARGINE PER 5 UNITS: Performed by: PSYCHIATRY & NEUROLOGY

## 2024-08-03 PROCEDURE — 93005 ELECTROCARDIOGRAM TRACING: CPT | Performed by: PSYCHIATRY & NEUROLOGY

## 2024-08-03 RX ORDER — ALBUTEROL SULFATE 90 UG/1
2 AEROSOL, METERED RESPIRATORY (INHALATION) EVERY 4 HOURS PRN
Status: DISCONTINUED | OUTPATIENT
Start: 2024-08-03 | End: 2024-08-06 | Stop reason: HOSPADM

## 2024-08-03 RX ORDER — BUDESONIDE AND FORMOTEROL FUMARATE DIHYDRATE 160; 4.5 UG/1; UG/1
2 AEROSOL RESPIRATORY (INHALATION)
Status: DISCONTINUED | OUTPATIENT
Start: 2024-08-03 | End: 2024-08-06 | Stop reason: HOSPADM

## 2024-08-03 RX ADMIN — TRAMADOL HYDROCHLORIDE 50 MG: 50 TABLET, COATED ORAL at 16:06

## 2024-08-03 RX ADMIN — INSULIN GLARGINE 8 UNITS: 100 INJECTION, SOLUTION SUBCUTANEOUS at 22:06

## 2024-08-03 RX ADMIN — LACTULOSE 20 G: 20 SOLUTION ORAL at 22:00

## 2024-08-03 RX ADMIN — DOXEPIN HYDROCHLORIDE 50 MG: 25 CAPSULE ORAL at 21:59

## 2024-08-03 RX ADMIN — LACTULOSE 20 G: 20 SOLUTION ORAL at 16:06

## 2024-08-03 RX ADMIN — CETIRIZINE HYDROCHLORIDE 5 MG: 10 TABLET, FILM COATED ORAL at 09:02

## 2024-08-03 RX ADMIN — INSULIN LISPRO 8 UNITS: 100 INJECTION, SOLUTION INTRAVENOUS; SUBCUTANEOUS at 22:07

## 2024-08-03 RX ADMIN — LACTULOSE 20 G: 20 SOLUTION ORAL at 09:02

## 2024-08-03 RX ADMIN — BUDESONIDE AND FORMOTEROL FUMARATE DIHYDRATE 2 PUFF: 160; 4.5 AEROSOL RESPIRATORY (INHALATION) at 19:12

## 2024-08-03 RX ADMIN — INSULIN LISPRO 5 UNITS: 100 INJECTION, SOLUTION INTRAVENOUS; SUBCUTANEOUS at 09:01

## 2024-08-03 RX ADMIN — POTASSIUM CHLORIDE 20 MEQ: 1500 TABLET, EXTENDED RELEASE ORAL at 10:12

## 2024-08-03 RX ADMIN — LEVOTHYROXINE SODIUM 75 MCG: 0.07 TABLET ORAL at 09:02

## 2024-08-03 RX ADMIN — METOPROLOL TARTRATE 25 MG: 25 TABLET, FILM COATED ORAL at 21:59

## 2024-08-03 NOTE — PLAN OF CARE
Goal Outcome Evaluation:  Plan of Care Reviewed With: patient  Patient Agreement with Plan of Care: agrees     Progress: no change

## 2024-08-03 NOTE — PLAN OF CARE
"  Problem: Adult Behavioral Health Plan of Care  Goal: Plan of Care Review  8/3/2024 1620 by Yobany Hylton RN  Outcome: Ongoing, Progressing  8/3/2024 1611 by Yobany Hylton RN  Outcome: Ongoing, Progressing   Goal Outcome Evaluation:  Plan of Care Reviewed With: patient  Patient Agreement with Plan of Care: agrees     Progress: no change  Outcome Evaluation: PATIENT VERY NEEDY, SEEKS STAFF OUT FREQUENTLY.  WANTING STAFF TO COME AND COVER HER FEET UP.  NOT WEARING O2 AS NEEDED, PULSE TACHY BUT WHEN LYING DOWN RESTING WITH O2 ON RATE DROPS.  ENDURANCE POOR BECOMES SOA, USING CANE TO STEADY GAIT.  HAD A CHOCKING SPELL AFTER TAKING SOME CHRONULAC AND PATIENT HAD DIFFICULT TIME CATCHING HER BREATH.  COUGHING SPUTUM CLEAR.  EATING GOOD, PAIN MEDS AS ORDERED PRN.  PATIENT CONFUSED AND FORGETFUL AT TIMES, NEEDS CUES AND REMINDERS.  SON VISITED TODAY BUT STATED \"WE ARE NOT CLOSE TO OUR MOM, SHE DIDN'T WANT US AROUND MUCH.\"  PATIENT ELIZABETH SUICIDE ATTEMPTS BECAUSE OF HER Buddhist SHE WOULD NOT DO ANYTHHING LIKE THAT.                               "

## 2024-08-03 NOTE — PLAN OF CARE
Goal Outcome Evaluation:  Plan of Care Reviewed With: patient  Patient Agreement with Plan of Care: agrees     Progress: no change  Outcome Evaluation: Patient new admit shift. Pt frequently seeks out staff. Pt had a difficult time falling asleep and staying asleep. Patient has slept about 3.5 hours this shift.
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
## 2024-08-03 NOTE — PLAN OF CARE
Goal Outcome Evaluation:  Plan of Care Reviewed With: patient, son  Patient Agreement with Plan of Care: agrees  Consent Given to Review Plan with: Andrés Gonzalez.  Progress: improving  Outcome Evaluation: Therapist met with patient to review care plan, social history, aftercare recommendations and disposition plans; patient agreeable.    Problem: Adult Behavioral Health Plan of Care  Goal: Plan of Care Review  Outcome: Ongoing, Progressing  Flowsheets (Taken 8/3/2024 1411)  Consent Given to Review Plan with: Andrés Gonzalez.  Progress: improving  Plan of Care Reviewed With:   patient   son  Patient Agreement with Plan of Care: agrees  Outcome Evaluation:   Therapist met with patient to review care plan, social history, aftercare recommendations and disposition plans   patient agreeable.     Problem: Adult Behavioral Health Plan of Care  Goal: Patient-Specific Goal (Individualization)  Outcome: Ongoing, Progressing  Flowsheets  Taken 8/3/2024 1411  Patient-Specific Goals (Include Timeframe): Patient will identify 2-3 healthy coping skills, complete safety plan, complete aftercare plan and deny SI/HI prior to discharge.  Individualized Care Needs: Therapist will offer 1-4 therapy sessions, safety planning, aftercare planning, family education, daily groups and brief CBT/MI interventions.  Anxieties, Fears or Concerns: None voiced.  Taken 8/3/2024 1406  Patient Personal Strengths:   expressive of emotions   expressive of needs   family/social support   stable living environment   tolerant   resilient   resourceful   self-awareness   self-reliant   motivated for treatment   motivated for recovery   independent living skills   spiritual/Sikh support  Patient Vulnerabilities: (Recent cancer diagnosis)   lacks insight into illness   poor impulse control   other (see comments)     Problem: Adult Behavioral Health Plan of Care  Goal: Optimized Coping Skills in Response to Life Stressors  Outcome: Ongoing,  Progressing  Flowsheets (Taken 8/3/2024 1411)  Optimized Coping Skills in Response to Life Stressors: making progress toward outcome  Intervention: Promote Effective Coping Strategies  Flowsheets (Taken 8/3/2024 1411)  Supportive Measures:   active listening utilized   counseling provided   decision-making supported   positive reinforcement provided   verbalization of feelings encouraged     Problem: Adult Behavioral Health Plan of Care  Goal: Develops/Participates in Therapeutic [place] to Support Successful Transition  Outcome: Ongoing, Progressing  Flowsheets (Taken 8/3/2024 1411)  Develops/Participates in Therapeutic [place] to Support Successful Transition: making progress toward outcome  Intervention: Foster Therapeutic [place]  Flowsheets (Taken 8/3/2024 1411)  Trust Relationship/Rapport:   questions encouraged   reassurance provided   choices provided   care explained   emotional support provided   thoughts/feelings acknowledged   empathic listening provided   questions answered  Intervention: Mutually Develop Transition Plan  Flowsheets (Taken 8/3/2024 1411)  Outpatient/Agency/Support Group Needs:   outpatient counseling   outpatient psychiatric care (specify)   outpatient medication management  Discharge Coordination/Progress:   Therapist met with patient to complete her discharge needs assessment   patient agreeable.  Transition Support:   community resources reviewed   follow-up care coordinated   follow-up care discussed   crisis management plan promoted   crisis management plan verbalized  Transportation Anticipated: family or friend will provide  Anticipated Discharge Disposition: home or self-care  Transportation Concerns: no car  Current Discharge Risk: psychiatric illness  Concerns to be Addressed:   adjustment to diagnosis/illness   coping/stress   suicidal   medication   mental health  Readmission Within the Last 30 Days: no previous admission in last 30 days  Patient/Family Anticipated  Services at Transition:   mental health services   outpatient care  Patient/Family Anticipates Transition to: home    DATA: Therapist met individually with Patient this date for initial evaluation.  Introduced self as Therapist and the role of a positive therapeutic relationship; Patient agreeable.      Therapist encouraged Patient to speak openly and honestly about any issues or stressors during treatment stay. Therapist explained how open communication is significant to providing most effective care.      Therapist completed psychosocial assessment, integrated summary, reviewed care plans, disposition planning and discussed hospitalization expectations and treatment goals this date.     Therapist provided education regarding different levels of care and is recommending outpatient care for most appropriate aftercare. Patient agreeable. Patient signed consent for [facility].     Therapist is recommending family involvement prior to discharge and it's importance. Patient agreeable and signed consent for her son, Andrés Ross.     Therapist spoke with Patient's son, Andrés. He states Patient has been struggling for a few years with several different health diagnoses. He states she has always been very independent and is struggling with losing her independence.  He states he often tries to help her and she just gets aggravated with him.  Andrés states he has offered to let her come to his house and stay and she refuses.    He states he called to check on her yesterday and she told him she was doing fine.  He states he had a funny feeling and went to check on her and found out she had called a friend to bring her to the ER.  He states  she appeared very anxious and nervous, which worried him, and he called an ambulance to come pick her up.  He states he stays worried because she is not honest with him about how she is feeling.    He requested to know how to go about putting her in a nursing home in the future  if that is what is needed.  Explained the process with him.    Completed safety planning and Andrés confirmed he will safeguard patient's house to the best possible extent.  Recommended locking up all firearms, knives, medications, razors and any other sharp objects, Andrés stated understanding.    CLINICAL MANUVERING/INTERVENTIONS:  Assisted Patient in processing session content; acknowledged and normalized Patient’s thoughts, feelings, and concerns by utilizing a person-centered approach in efforts to build appropriate rapport and a positive therapeutic relationship with open and honest communication. Allowed Patient to ventilate regarding current stressors and triggers for negative emotions and thoughts in a safe nonjudgmental environment with unconditional positive regard, active listening skills, and empathy.     ASSESSMENT: Conchita Askew is a 78-year-old  female who presented to the ED with complaints of worsening depression and SI.  Patient was calm and cooperative with assessment.  She states she has been struggling with the diagnosis of lung cancer and has recently started chemotherapy.  Patient states she has always been independent and is struggling with feeling weak and not being able to do things for herself.  She reports feeling like she has been depressed for a while.     PLAN: Patient will receive 24/7 nursing monitoring and daily psychiatrist evaluation by a multidisciplinary team.    Patient will continue stabilization at this time.     Patient is agreeable for outpatient services with [facility].     Public assistance with transportation will not be needed. Family member will provide.

## 2024-08-03 NOTE — NURSING NOTE
Patient requesting dose of Tramadol that she normally takes at home for pain in her chest r/t her lung cancer.  notified of the patient's request and home medication list reviewed. Order for Tramadol 50 mg po BID prn. Order read back and verified x 2.

## 2024-08-03 NOTE — PLAN OF CARE
Goal Outcome Evaluation:  Plan of Care Reviewed With: patient  Patient Agreement with Plan of Care: agrees     Progress: no change  Outcome Evaluation: Patient brought to the ED by her son for depression and suicidal thoughts. Patient very tearful during admission assessment. Patient struggling with her diagnosis of lung cancer and had her first chemo treatment today and appears as thought it was mentally draining. Patient very weak and has shortness of breath. Patient is on O2 @ 2 liters continuously. Patient uses cane to ambulate. Complains of dizziness and unsteadiness at times. Patient states she has been struggling with depression for a while now and can't do things for herself like she use to. Patient states she has had thoughts of going to sleep and not waking up. She rates anxiety and depression 10/10. Denies HI/AVH. Denies drug or alcohol abuse.

## 2024-08-03 NOTE — NURSING NOTE
PATIENT HAD CHOKING SPELL AFTER TAKING CHRONULAC.  PATIENT STATES SHE DOES THIS AT HOME BECAUSE HER CANCER IS PUSHING ON HER WINDPIPE.

## 2024-08-04 LAB
ANION GAP SERPL CALCULATED.3IONS-SCNC: 6.4 MMOL/L (ref 5–15)
ANION GAP SERPL CALCULATED.3IONS-SCNC: 6.7 MMOL/L (ref 5–15)
BUN SERPL-MCNC: 14 MG/DL (ref 8–23)
BUN SERPL-MCNC: 16 MG/DL (ref 8–23)
BUN/CREAT SERPL: 27.5 (ref 7–25)
BUN/CREAT SERPL: 29.1 (ref 7–25)
CALCIUM SPEC-SCNC: 7.6 MG/DL (ref 8.6–10.5)
CALCIUM SPEC-SCNC: 8.3 MG/DL (ref 8.6–10.5)
CHLORIDE SERPL-SCNC: 105 MMOL/L (ref 98–107)
CHLORIDE SERPL-SCNC: 106 MMOL/L (ref 98–107)
CO2 SERPL-SCNC: 24.3 MMOL/L (ref 22–29)
CO2 SERPL-SCNC: 24.6 MMOL/L (ref 22–29)
CREAT SERPL-MCNC: 0.51 MG/DL (ref 0.57–1)
CREAT SERPL-MCNC: 0.55 MG/DL (ref 0.57–1)
EGFRCR SERPLBLD CKD-EPI 2021: 94.5 ML/MIN/1.73
EGFRCR SERPLBLD CKD-EPI 2021: 96.3 ML/MIN/1.73
GLUCOSE BLDC GLUCOMTR-MCNC: 156 MG/DL (ref 70–130)
GLUCOSE BLDC GLUCOMTR-MCNC: 166 MG/DL (ref 70–130)
GLUCOSE BLDC GLUCOMTR-MCNC: 175 MG/DL (ref 70–130)
GLUCOSE BLDC GLUCOMTR-MCNC: 190 MG/DL (ref 70–130)
GLUCOSE SERPL-MCNC: 154 MG/DL (ref 65–99)
GLUCOSE SERPL-MCNC: 190 MG/DL (ref 65–99)
IRON 24H UR-MRATE: 238 MCG/DL (ref 37–145)
IRON SATN MFR SERPL: 80 % (ref 20–50)
MAGNESIUM SERPL-MCNC: 2.1 MG/DL (ref 1.6–2.4)
POTASSIUM SERPL-SCNC: 4 MMOL/L (ref 3.5–5.2)
POTASSIUM SERPL-SCNC: 4 MMOL/L (ref 3.5–5.2)
QT INTERVAL: 308 MS
QT INTERVAL: 376 MS
QTC INTERVAL: 447 MS
QTC INTERVAL: 477 MS
SODIUM SERPL-SCNC: 136 MMOL/L (ref 136–145)
SODIUM SERPL-SCNC: 137 MMOL/L (ref 136–145)
TIBC SERPL-MCNC: 297 MCG/DL (ref 298–536)
TRANSFERRIN SERPL-MCNC: 199 MG/DL (ref 200–360)
TSH SERPL DL<=0.05 MIU/L-ACNC: 0.8 UIU/ML (ref 0.27–4.2)

## 2024-08-04 PROCEDURE — 93010 ELECTROCARDIOGRAM REPORT: CPT | Performed by: INTERNAL MEDICINE

## 2024-08-04 PROCEDURE — 82948 REAGENT STRIP/BLOOD GLUCOSE: CPT

## 2024-08-04 PROCEDURE — 63710000001 INSULIN LISPRO (HUMAN) PER 5 UNITS: Performed by: PSYCHIATRY & NEUROLOGY

## 2024-08-04 PROCEDURE — 84466 ASSAY OF TRANSFERRIN: CPT | Performed by: PSYCHIATRY & NEUROLOGY

## 2024-08-04 PROCEDURE — 84443 ASSAY THYROID STIM HORMONE: CPT | Performed by: PSYCHIATRY & NEUROLOGY

## 2024-08-04 PROCEDURE — 63710000001 INSULIN GLARGINE PER 5 UNITS: Performed by: PSYCHIATRY & NEUROLOGY

## 2024-08-04 PROCEDURE — 94640 AIRWAY INHALATION TREATMENT: CPT

## 2024-08-04 PROCEDURE — 94664 DEMO&/EVAL PT USE INHALER: CPT

## 2024-08-04 PROCEDURE — 93005 ELECTROCARDIOGRAM TRACING: CPT | Performed by: PSYCHIATRY & NEUROLOGY

## 2024-08-04 PROCEDURE — 99232 SBSQ HOSP IP/OBS MODERATE 35: CPT | Performed by: PSYCHIATRY & NEUROLOGY

## 2024-08-04 PROCEDURE — 80048 BASIC METABOLIC PNL TOTAL CA: CPT | Performed by: PSYCHIATRY & NEUROLOGY

## 2024-08-04 PROCEDURE — 94761 N-INVAS EAR/PLS OXIMETRY MLT: CPT

## 2024-08-04 PROCEDURE — 94799 UNLISTED PULMONARY SVC/PX: CPT

## 2024-08-04 PROCEDURE — 83540 ASSAY OF IRON: CPT | Performed by: PSYCHIATRY & NEUROLOGY

## 2024-08-04 PROCEDURE — 83735 ASSAY OF MAGNESIUM: CPT | Performed by: PSYCHIATRY & NEUROLOGY

## 2024-08-04 RX ORDER — CALCIUM CARBONATE 500(1250)
500 TABLET ORAL 2 TIMES DAILY
Status: DISCONTINUED | OUTPATIENT
Start: 2024-08-04 | End: 2024-08-06 | Stop reason: HOSPADM

## 2024-08-04 RX ORDER — CALCIUM CARBONATE 500(1250)
500 TABLET ORAL 3 TIMES DAILY
Status: DISCONTINUED | OUTPATIENT
Start: 2024-08-04 | End: 2024-08-04

## 2024-08-04 RX ADMIN — TRAMADOL HYDROCHLORIDE 50 MG: 50 TABLET, COATED ORAL at 06:03

## 2024-08-04 RX ADMIN — INSULIN LISPRO 3 UNITS: 100 INJECTION, SOLUTION INTRAVENOUS; SUBCUTANEOUS at 21:19

## 2024-08-04 RX ADMIN — BUDESONIDE AND FORMOTEROL FUMARATE DIHYDRATE 2 PUFF: 160; 4.5 AEROSOL RESPIRATORY (INHALATION) at 19:45

## 2024-08-04 RX ADMIN — TRAMADOL HYDROCHLORIDE 50 MG: 50 TABLET, COATED ORAL at 21:24

## 2024-08-04 RX ADMIN — LACTULOSE 20 G: 20 SOLUTION ORAL at 15:48

## 2024-08-04 RX ADMIN — INSULIN GLARGINE 8 UNITS: 100 INJECTION, SOLUTION SUBCUTANEOUS at 21:19

## 2024-08-04 RX ADMIN — LACTULOSE 20 G: 20 SOLUTION ORAL at 21:15

## 2024-08-04 RX ADMIN — DOXEPIN HYDROCHLORIDE 50 MG: 25 CAPSULE ORAL at 21:14

## 2024-08-04 RX ADMIN — LEVOTHYROXINE SODIUM 75 MCG: 0.07 TABLET ORAL at 08:34

## 2024-08-04 RX ADMIN — LACTULOSE 20 G: 20 SOLUTION ORAL at 08:34

## 2024-08-04 RX ADMIN — INSULIN LISPRO 3 UNITS: 100 INJECTION, SOLUTION INTRAVENOUS; SUBCUTANEOUS at 11:23

## 2024-08-04 RX ADMIN — ALUMINUM HYDROXIDE, MAGNESIUM HYDROXIDE, DIMETHICONE 15 ML: 400; 400; 40 SUSPENSION ORAL at 03:39

## 2024-08-04 RX ADMIN — Medication 500 MG: at 11:23

## 2024-08-04 RX ADMIN — BUDESONIDE AND FORMOTEROL FUMARATE DIHYDRATE 2 PUFF: 160; 4.5 AEROSOL RESPIRATORY (INHALATION) at 07:18

## 2024-08-04 RX ADMIN — CETIRIZINE HYDROCHLORIDE 5 MG: 10 TABLET, FILM COATED ORAL at 08:34

## 2024-08-04 RX ADMIN — INSULIN LISPRO 3 UNITS: 100 INJECTION, SOLUTION INTRAVENOUS; SUBCUTANEOUS at 07:38

## 2024-08-04 RX ADMIN — Medication 500 MG: at 21:13

## 2024-08-04 RX ADMIN — INSULIN LISPRO 3 UNITS: 100 INJECTION, SOLUTION INTRAVENOUS; SUBCUTANEOUS at 17:23

## 2024-08-04 NOTE — PLAN OF CARE
Goal Outcome Evaluation:  Plan of Care Reviewed With: patient  Patient Agreement with Plan of Care: agrees     Progress: improving  Outcome Evaluation: Pt often seeks staff out and is very needy. Pt complains because she does not the food here and reports that she needs diet soda around the clock because thats all she drinks at home. Pt reports poor sleep and fair appetite. Pt rates dep and anx 8/8. Pt denies SI/HI/AVH at this time.

## 2024-08-04 NOTE — PROGRESS NOTES
Inpatient Psych Progress Note     Clinician: Vernon Chilel MD  Admission Date: 8/2/2024  07:17 EDT 08/04/24    Behavioral Health Treatment Plan and Problem List: I have reviewed and approved the Behavioral Health Treatment Plan and Problem list.    Allergies  Allergies   Allergen Reactions    Ciprofloxacin Nausea Only, Rash and Other (See Comments)     Unknown-flu symptoms    Sertraline Other (See Comments)       Hospital Day: 2 days      Assessment completed within view of staff    History  CC/clinical focus: depression     Interval HPI: Patient seen and evaluated by me.  Chart reviewed.  Recent diagnosis of lung cancer, feeling more anxious and increase in agitation. She reports poor sleep last night. Feeling very tired this morning. Mood seems some improved, more hopeful. She states that she has always been independent but realizes that she needs more help now. Denies SI/HI/AVH at this time.   Med Compliant.  ROS otherwise as below.    Review of Systems   Constitutional:  Positive for fatigue.   HENT: Negative.     Eyes: Negative.    Respiratory:  Positive for shortness of breath.    Cardiovascular: Negative.    Gastrointestinal: Negative.    Endocrine: Negative.    Genitourinary: Negative.    Musculoskeletal: Negative.    Skin: Negative.    Allergic/Immunologic: Negative.    Neurological: Negative.    Hematological: Negative.    Psychiatric/Behavioral:  Positive for dysphoric mood and sleep disturbance. The patient is nervous/anxious.         /55 (BP Location: Right arm, Patient Position: Standing)   Pulse 83   Temp 97.9 °F (36.6 °C) (Temporal)   Resp 18   SpO2 98%     Mental Status Exam  Mood: depressed  Affect: mood-congruent   Thought Processes:  linear  Thought Content: normal  Hallucinations: no  Suicidal Thoughts: denies  Suicidal Plan/Intent: denies  Hopelesness:Moderate  Homicidal Thoughts:  denies      Medical Decision Making:   Labs:     Lab Results (last 24 hours)       Procedure  Component Value Units Date/Time    TSH [[id number]]  (Normal) Collected: 08/04/24 0542    Specimen: Blood Updated: 08/04/24 0636     TSH 0.795 uIU/mL     Magnesium [[id number]]  (Normal) Collected: 08/04/24 0542    Specimen: Blood Updated: 08/04/24 0622     Magnesium 2.1 mg/dL     Basic Metabolic Panel [[id number]]  (Abnormal) Collected: 08/04/24 0542    Specimen: Blood Updated: 08/04/24 0622     Glucose 154 mg/dL      BUN 14 mg/dL      Creatinine 0.51 mg/dL      Sodium 137 mmol/L      Potassium 4.0 mmol/L      Comment: Slight hemolysis detected by analyzer. Result may be falsely elevated.        Chloride 106 mmol/L      CO2 24.3 mmol/L      Calcium 7.6 mg/dL      BUN/Creatinine Ratio 27.5     Anion Gap 6.7 mmol/L      eGFR 96.3 mL/min/1.73     Narrative:      GFR Normal >60  Chronic Kidney Disease <60  Kidney Failure <15    The GFR formula is only valid for adults with stable renal function between ages 18 and 70.    Iron Profile [[id number]]  (Abnormal) Collected: 08/04/24 0542    Specimen: Blood Updated: 08/04/24 0622     Iron 238 mcg/dL      Iron Saturation (TSAT) 80 %      Transferrin 199 mg/dL      TIBC 297 mcg/dL     POC Glucose Once [[id number]]  (Abnormal) Collected: 08/04/24 0614    Specimen: Blood Updated: 08/04/24 0621     Glucose 156 mg/dL     POC Glucose Once [[id number]]  (Abnormal) Collected: 08/03/24 2129    Specimen: Blood Updated: 08/03/24 2135     Glucose 265 mg/dL     POC Glucose Once [[id number]]  (Abnormal) Collected: 08/03/24 1625    Specimen: Blood Updated: 08/03/24 1633     Glucose 195 mg/dL     POC Glucose Once [[id number]]  (Abnormal) Collected: 08/03/24 1156    Specimen: Blood Updated: 08/03/24 1214     Glucose 146 mg/dL               Radiology:     Imaging Results (Last 24 Hours)       ** No results found for the last 24 hours. **              EKG:     ECG/EMG Results (most recent)       Procedure Component Value Units Date/Time    ECG 12 Lead Tachycardia [[id number]] Collected: 08/03/24 212      Updated: 08/03/24 2124     QT Interval 308 ms      QTC Interval 447 ms     Narrative:      Test Reason : Tachycardia  Blood Pressure :   */*   mmHG  Vent. Rate : 127 BPM     Atrial Rate : 127 BPM     P-R Int : 162 ms          QRS Dur :  82 ms      QT Int : 308 ms       P-R-T Axes :  65   6  36 degrees     QTc Int : 447 ms    Sinus tachycardia  Cannot rule out Anterior infarct , age undetermined  Abnormal ECG  When compared with ECG of 02-AUG-2024 13:11,  No significant change was found    Referred By: VIKI           Confirmed By:     ECG 12 Lead QT Measurement [[id number]] Collected: 08/04/24 0713     Updated: 08/04/24 0715     QT Interval 376 ms      QTC Interval 477 ms     Narrative:      Test Reason : QT Measurement  Blood Pressure :   */*   mmHG  Vent. Rate :  97 BPM     Atrial Rate :  97 BPM     P-R Int : 180 ms          QRS Dur :  84 ms      QT Int : 376 ms       P-R-T Axes :  75   4  45 degrees     QTc Int : 477 ms    Normal sinus rhythm  Low voltage QRS  Borderline ECG  When compared with ECG of 03-AUG-2024 21:23, (Unconfirmed)  No significant change was found    Referred By:            Confirmed By:              Medications:  budesonide-formoterol, 2 puff, Inhalation, BID - RT  cetirizine, 5 mg, Oral, Daily  doxepin, 50 mg, Oral, Nightly  insulin glargine, 8 Units, Subcutaneous, Nightly  insulin lispro, 3-14 Units, Subcutaneous, 4x Daily AC & at Bedtime  lactulose, 20 g, Oral, TID  levothyroxine, 75 mcg, Oral, Daily           All medications reviewed.      Assessment and Plan:      Suicidal Ideation  - Admitted for safety and stabilization  - SP3    Unspecified Depressive Disorder  - We did not start an antidepressant medication on admission due to prolonged QT  - Continue to monitor symptom burden and EKG, may consider antidepressant pending course     Insomnia  - Continue home dose of doxepin 50mg QHS     Squamous cell carcinoma of the lung  - Follow up with oncology     Asthma/COPD  - albuterol PRN  -  substitute home breztri with symbicort     CHF  - outpatient follow up     Cirrhosis  - outpatient follow up     Diabetes  - SSI  - A1c 6.2%     Thyroid Disease  - synthroid  - TSH 0.795     Anemia  - Hb 9.7 on admission  - iron studies reviewed, mildly elevated  - suspect secondary to cirrhosis  - monitor, outpatient follow up     Hypocalemia  - 8.3 on admission  - 7.6 on 08/04  - Start calcium Carbonate 500mg BID  - Monitor BMP  - Patient will need to follow up on outpatient basis to determine etiology of hypocalcemia      Prolonged QT  - QTC interval 610 on admission  - patient currently asymptomatic  - Hold zofran, trazodone, and compazine for now  - repeat EKG improved with QTc 447 yesterday and 477 today  - Mg normal, hypocalcemia as above may be contributing  - continue to monitor, consider cardiology consult if worsens    Tachycardia  - Patient has been experiencing episodes of tachycardia.  EKG during an episode yesterday did not yield evidence of arrhythmia.  Continue to monitor.  Outpatient follow-up.    Iron Overload  - iron studies suggest iron overload of unknown etiology  - Patient will need to follow up on outpatient basis for further evaluation      Continue hospitalization for safety and stabilization.  Continue current level of Special Precautions (q15 minute checks).        This note was generated by a scribe, Lion Jacobs. The work documented in this note was completed, reviewed, and approved by the attending psychiatrist as designated Dr. Vernon Chilel electronic signature.     I, Vernon Chilel MD, personally performed the services described in this documentation as scribed by the above named individual and is both accurate and complete as of 8/4/2024.

## 2024-08-04 NOTE — NURSING NOTE
Pt with elevated heart rate while resting and ambulating. Pt has continuous order for oxygen at 2 lpm and not compliant with order. Pt insisted on walking in the hallway without oxygen. Dr Chilel notified new order for EKG.

## 2024-08-05 ENCOUNTER — DOCUMENTATION (OUTPATIENT)
Dept: ONCOLOGY | Facility: CLINIC | Age: 79
End: 2024-08-05
Payer: MEDICARE

## 2024-08-05 LAB
ANION GAP SERPL CALCULATED.3IONS-SCNC: 5.7 MMOL/L (ref 5–15)
BUN SERPL-MCNC: 16 MG/DL (ref 8–23)
BUN/CREAT SERPL: 27.1 (ref 7–25)
CALCIUM SPEC-SCNC: 8.2 MG/DL (ref 8.6–10.5)
CHLORIDE SERPL-SCNC: 105 MMOL/L (ref 98–107)
CO2 SERPL-SCNC: 25.3 MMOL/L (ref 22–29)
CREAT SERPL-MCNC: 0.59 MG/DL (ref 0.57–1)
EGFRCR SERPLBLD CKD-EPI 2021: 93 ML/MIN/1.73
GLUCOSE BLDC GLUCOMTR-MCNC: 157 MG/DL (ref 70–130)
GLUCOSE BLDC GLUCOMTR-MCNC: 161 MG/DL (ref 70–130)
GLUCOSE BLDC GLUCOMTR-MCNC: 186 MG/DL (ref 70–130)
GLUCOSE BLDC GLUCOMTR-MCNC: 217 MG/DL (ref 70–130)
GLUCOSE SERPL-MCNC: 152 MG/DL (ref 65–99)
POTASSIUM SERPL-SCNC: 4.2 MMOL/L (ref 3.5–5.2)
SODIUM SERPL-SCNC: 136 MMOL/L (ref 136–145)

## 2024-08-05 PROCEDURE — 97165 OT EVAL LOW COMPLEX 30 MIN: CPT

## 2024-08-05 PROCEDURE — 82948 REAGENT STRIP/BLOOD GLUCOSE: CPT

## 2024-08-05 PROCEDURE — 63710000001 INSULIN LISPRO (HUMAN) PER 5 UNITS: Performed by: PSYCHIATRY & NEUROLOGY

## 2024-08-05 PROCEDURE — 94799 UNLISTED PULMONARY SVC/PX: CPT

## 2024-08-05 PROCEDURE — 94664 DEMO&/EVAL PT USE INHALER: CPT

## 2024-08-05 PROCEDURE — 80048 BASIC METABOLIC PNL TOTAL CA: CPT | Performed by: PSYCHIATRY & NEUROLOGY

## 2024-08-05 PROCEDURE — 94761 N-INVAS EAR/PLS OXIMETRY MLT: CPT

## 2024-08-05 PROCEDURE — 99232 SBSQ HOSP IP/OBS MODERATE 35: CPT | Performed by: PSYCHIATRY & NEUROLOGY

## 2024-08-05 PROCEDURE — 63710000001 INSULIN GLARGINE PER 5 UNITS: Performed by: PSYCHIATRY & NEUROLOGY

## 2024-08-05 PROCEDURE — 97162 PT EVAL MOD COMPLEX 30 MIN: CPT

## 2024-08-05 RX ORDER — DOXEPIN HYDROCHLORIDE 10 MG/1
10 CAPSULE ORAL ONCE
Status: COMPLETED | OUTPATIENT
Start: 2024-08-05 | End: 2024-08-05

## 2024-08-05 RX ORDER — BUSPIRONE HYDROCHLORIDE 5 MG/1
5 TABLET ORAL EVERY 12 HOURS SCHEDULED
Status: DISCONTINUED | OUTPATIENT
Start: 2024-08-05 | End: 2024-08-06 | Stop reason: HOSPADM

## 2024-08-05 RX ADMIN — BUSPIRONE HYDROCHLORIDE 5 MG: 5 TABLET ORAL at 21:30

## 2024-08-05 RX ADMIN — LACTULOSE 20 G: 20 SOLUTION ORAL at 15:33

## 2024-08-05 RX ADMIN — INSULIN LISPRO 3 UNITS: 100 INJECTION, SOLUTION INTRAVENOUS; SUBCUTANEOUS at 07:37

## 2024-08-05 RX ADMIN — Medication 500 MG: at 21:30

## 2024-08-05 RX ADMIN — DOXEPIN HYDROCHLORIDE 10 MG: 10 CAPSULE ORAL at 23:49

## 2024-08-05 RX ADMIN — INSULIN LISPRO 5 UNITS: 100 INJECTION, SOLUTION INTRAVENOUS; SUBCUTANEOUS at 17:08

## 2024-08-05 RX ADMIN — CETIRIZINE HYDROCHLORIDE 5 MG: 10 TABLET, FILM COATED ORAL at 08:13

## 2024-08-05 RX ADMIN — INSULIN LISPRO 3 UNITS: 100 INJECTION, SOLUTION INTRAVENOUS; SUBCUTANEOUS at 11:19

## 2024-08-05 RX ADMIN — LACTULOSE 20 G: 20 SOLUTION ORAL at 08:13

## 2024-08-05 RX ADMIN — Medication 500 MG: at 08:13

## 2024-08-05 RX ADMIN — INSULIN GLARGINE 8 UNITS: 100 INJECTION, SOLUTION SUBCUTANEOUS at 21:32

## 2024-08-05 RX ADMIN — BUDESONIDE AND FORMOTEROL FUMARATE DIHYDRATE 2 PUFF: 160; 4.5 AEROSOL RESPIRATORY (INHALATION) at 19:50

## 2024-08-05 RX ADMIN — BUDESONIDE AND FORMOTEROL FUMARATE DIHYDRATE 2 PUFF: 160; 4.5 AEROSOL RESPIRATORY (INHALATION) at 08:28

## 2024-08-05 RX ADMIN — BUSPIRONE HYDROCHLORIDE 5 MG: 5 TABLET ORAL at 15:33

## 2024-08-05 RX ADMIN — INSULIN LISPRO 3 UNITS: 100 INJECTION, SOLUTION INTRAVENOUS; SUBCUTANEOUS at 21:32

## 2024-08-05 RX ADMIN — LEVOTHYROXINE SODIUM 75 MCG: 0.07 TABLET ORAL at 08:13

## 2024-08-05 NOTE — THERAPY EVALUATION
Acute Care - Occupational Therapy Initial Evaluation   [facility]     Patient Name: Conchita Ross  : 1945  MRN: [id number]  Today's Date: 2024             Admit Date: 2024     No diagnosis found.  Patient Active Problem List   Diagnosis    Non-small cell cancer of left lung    MDD (major depressive disorder)     Past Medical History:   Diagnosis Date    Anemia     Arthritis     Asthma     Cancer     squamous cell carinoma lung Left    CHF (congestive heart failure)     Cirrhosis     COPD (chronic obstructive pulmonary disease)     Diabetes mellitus     Disease of thyroid gland     Diverticulitis     Encounter for blood transfusion     Fibromyalgia     Headache     Low back pain     Osteoporosis      Past Surgical History:   Procedure Laterality Date    CHOLECYSTECTOMY  2012    HYSTERECTOMY      PORTACATH PLACEMENT N/A 2024    Procedure: INSERTION OF PORTACATH;  Surgeon: Austen Mendez MD;  Location: [facility];  Service: General;  Laterality: N/A;    SINUS SURGERY           OT ASSESSMENT FLOWSHEET (Last 12 Hours)       OT Evaluation and Treatment       Row Name 24 1348                   OT Time and Intention    Subjective Information no complaints  -LM        Document Type evaluation  -LM        Mode of Treatment occupational therapy  -LM        Patient Effort good  -LM           General Information    Patient Profile Reviewed yes  -LM        Prior Level of Function independent:;ADL's;transfer;all household mobility  modified independent  -LM        Equipment Currently Used at Home none  -LM        Existing Precautions/Restrictions fall  cane  -LM           Living Environment    Current Living Arrangements home  -LM        People in Home alone  -LM           Home Use of Assistive/Adaptive Equipment    Equipment Currently Used at Home cane, straight;oxygen  -LM           Cognition    Affect/Mental Status (Cognition) WNL  -LM        Orientation Status (Cognition) oriented x 4   -LM           Range of Motion (ROM)    Range of Motion ROM is WNL  -LM           Strength (Manual Muscle Testing)    Strength (Manual Muscle Testing) strength is WNL  -LM           Activities of Daily Living    BADL Assessment/Intervention bathing;upper body dressing;lower body dressing;grooming;feeding;toileting  -LM           Bathing Assessment/Intervention    Stark Level (Bathing) modified independence  -LM           Upper Body Dressing Assessment/Training    Stark Level (Upper Body Dressing) modified independence  -LM           Lower Body Dressing Assessment/Training    Stark Level (Lower Body Dressing) modified independence  -LM           Grooming Assessment/Training    Stark Level (Grooming) modified independence  -LM           Self-Feeding Assessment/Training    Stark Level (Feeding) independent  -LM           Toileting Assessment/Training    Stark Level (Toileting) modified independence  -LM           Plan of Care Review    Plan of Care Reviewed With patient  -LM           Positioning and Restraints    Post Treatment Position bed  -LM        In Bed call light within reach;encouraged to call for assist  -LM           Therapy Assessment/Plan (OT)    Criteria for Skilled Therapeutic Interventions Met (OT) no;no problems identified which require skilled intervention;does not meet criteria for skilled intervention  recommend continue to perform BADL while seated for safety  -LM        Therapy Frequency (OT) evaluation only  -LM           Therapy Plan Review/Discharge Plan (OT)    Anticipated Discharge Disposition (OT) home  -LM                  User Key  (r) = Recorded By, (t) = Taken By, (c) = Cosigned By      Initials Name Effective Dates    LM Edda Lentz, OT 06/16/21 -                            OT Recommendation and Plan  Therapy Frequency (OT): evaluation only  Plan of Care Review  Plan of Care Reviewed With: patient  Plan of Care Reviewed With:  patient        Time Calculation:     Therapy Charges for Today       Code Description Service Date Service Provider Modifiers Qty    64436606324  OT EVAL LOW COMPLEXITY 4 8/5/2024 Edda Lentz, OT GO 1                 Edda Lentz OT  8/5/2024

## 2024-08-05 NOTE — PROGRESS NOTES
"INPATIENT PSYCHIATRIC PROGRESS NOTE    Name:  Conchita Ross  :  1945  MRN:  [id number]  Visit Number:  [id number]  Length of stay:  3    SUBJECTIVE    CC/Focus of Exam: depression and anxiety    INTERVAL HISTORY:  The patient reports she is feeling very anxious and also is physically weak and has difficulty getting around and also gets short of breath. She doesn't recall why she was brought to the hospital but denies any active suicidal ideations. She thinks her anxiety may get better when she goes home and be in her familiar environment but feels too weak at this time go home. Has to decide about the chemo for her recently diagnosed lung cancer.     Review of Systems   Constitutional:  Positive for fatigue.   Respiratory:  Positive for shortness of breath.    Neurological:  Positive for weakness.   Psychiatric/Behavioral:  Positive for dysphoric mood. The patient is nervous/anxious.        OBJECTIVE    Temp:  [97.3 °F (36.3 °C)-99.3 °F (37.4 °C)] 97.3 °F (36.3 °C)  Heart Rate:  [110-119] 110  Resp:  [18-20] 18  BP: ()/(46-57) 88/46    MENTAL STATUS EXAM:  Appearance:Casually dressed, good hygeine.   Cooperation:Cooperative  Psychomotor: No psychomotor agitation/retardation, No EPS, No motor tics  Speech-normal rate, amount.  Mood \"anxious\"   Affect-congruent, appropriate, stable  Thought Content-goal directed, no delusional material present  Thought process-linear, organized.  Suicidality: No SI  Homicidality: No HI  Perception: No AH/VH  Insight-fair   Judgement-fair    Lab Results (last 24 hours)       Procedure Component Value Units Date/Time    POC Glucose Once [[id number]]  (Abnormal) Collected: 24 1114    Specimen: Blood Updated: 24 1122     Glucose 186 mg/dL     POC Glucose Once [[id number]]  (Abnormal) Collected: 24 0609    Specimen: Blood Updated: 24 0618     Glucose 157 mg/dL     Basic Metabolic Panel [[id number]]  (Abnormal) Collected: 24    Specimen: " Blood Updated: 08/05/24 0609     Glucose 152 mg/dL      BUN 16 mg/dL      Creatinine 0.59 mg/dL      Sodium 136 mmol/L      Potassium 4.2 mmol/L      Chloride 105 mmol/L      CO2 25.3 mmol/L      Calcium 8.2 mg/dL      BUN/Creatinine Ratio 27.1     Anion Gap 5.7 mmol/L      eGFR 93.0 mL/min/1.73     Narrative:      GFR Normal >60  Chronic Kidney Disease <60  Kidney Failure <15    The GFR formula is only valid for adults with stable renal function between ages 18 and 70.    POC Glucose Once [[id number]]  (Abnormal) Collected: 08/04/24 2103    Specimen: Blood Updated: 08/04/24 2111     Glucose 190 mg/dL     POC Glucose Once [[id number]]  (Abnormal) Collected: 08/04/24 1613    Specimen: Blood Updated: 08/04/24 1627     Glucose 175 mg/dL                Imaging Results (Last 24 Hours)       ** No results found for the last 24 hours. **               ECG/EMG Results (most recent)       Procedure Component Value Units Date/Time    ECG 12 Lead Tachycardia [[id number]] Collected: 08/03/24 2123     Updated: 08/04/24 1501     QT Interval 308 ms      QTC Interval 447 ms     Narrative:      Test Reason : Tachycardia  Blood Pressure :   */*   mmHG  Vent. Rate : 127 BPM     Atrial Rate : 127 BPM     P-R Int : 162 ms          QRS Dur :  82 ms      QT Int : 308 ms       P-R-T Axes :  65   6  36 degrees     QTc Int : 447 ms    Sinus tachycardia  Cannot rule out Anterior infarct , age undetermined  Abnormal ECG  When compared with ECG of 02-AUG-2024 13:11,  No significant change was found  Confirmed by Kane Galindo ([id number]) on 8/4/2024 3:01:01 PM    Referred By: VIKI           Confirmed By: Kane Galindo    ECG 12 Lead QT Measurement [[id number]] Collected: 08/04/24 0713     Updated: 08/04/24 1504     QT Interval 376 ms      QTC Interval 477 ms     Narrative:      Test Reason : QT Measurement  Blood Pressure :   */*   mmHG  Vent. Rate :  97 BPM     Atrial Rate :  97 BPM     P-R Int : 180 ms          QRS Dur :  84 ms      QT Int : 376 ms        P-R-T Axes :  75   4  45 degrees     QTc Int : 477 ms    Normal sinus rhythm  Low voltage QRS  Borderline ECG  When compared with ECG of 03-AUG-2024 21:23, (Unconfirmed)  No significant change was found  Confirmed by Kane Galindo ([id number]) on 8/4/2024 3:02:23 PM    Referred By:            Confirmed By: Kane Galindo             ALLERGIES: Ciprofloxacin and Sertraline    Medication Review:   Scheduled Medications:  budesonide-formoterol, 2 puff, Inhalation, BID - RT  calcium carbonate (oyster shell), 500 mg, Oral, BID  cetirizine, 5 mg, Oral, Daily  doxepin, 50 mg, Oral, Nightly  insulin glargine, 8 Units, Subcutaneous, Nightly  insulin lispro, 3-14 Units, Subcutaneous, 4x Daily AC & at Bedtime  lactulose, 20 g, Oral, TID  levothyroxine, 75 mcg, Oral, Daily         PRN Medications:    acetaminophen    albuterol sulfate HFA    aluminum-magnesium hydroxide-simethicone    benzonatate    bisacodyl    dextrose    dextrose    glucagon (human recombinant)    ibuprofen    loperamide    magnesium hydroxide    melatonin    sodium chloride    traMADol   All medications reviewed.    ASSESSMENT & PLAN:    Suicidal Ideation  - Admitted for safety and stabilization  - SP3     Unspecified Depressive Disorder  - Continue doxepin 50 mg at bedtime    Unspecified anxiety disorder  - Start buspirone 5 mg bid     Insomnia  - Continue home dose of doxepin 50 mg at bedtime     Squamous cell carcinoma of the lung  - Follow up with oncology     Asthma/COPD  - albuterol PRN  - substitute home breztri with symbicort     CHF  - outpatient follow up     Cirrhosis  - outpatient follow up     Diabetes  - SSI  - A1c 6.2%     Thyroid Disease  - synthroid  - TSH 0.795     Anemia  - Hb 9.7 on admission  - iron studies reviewed, mildly elevated  - suspect secondary to cirrhosis  - monitor, outpatient follow up     Hypocalemia  - 8.3 on admission  - 7.6 on 08/04, 8.3 on 08/04 and 8.2 today  - Start calcium Carbonate 500mg BID  - Monitor BMP  - Patient will  need to follow up on outpatient basis to determine etiology of hypocalcemia        Prolonged QT  - QTC interval 610 on admission  - patient currently asymptomatic  - Hold zofran, trazodone, and compazine for now  - repeat EKG improved with QTc 447 8/3 and 477 on 8/4  - Mg normal, hypocalcemia as above may be contributing  - continue to monitor, consider cardiology consult if worsens     Tachycardia  - Patient has been experiencing episodes of tachycardia.  EKG during an episode yesterday did not yield evidence of arrhythmia.  Continue to monitor.  Outpatient follow-up.     Iron Overload  - iron studies suggest iron overload of unknown etiology  - Patient will need to follow up on outpatient basis for further evaluation    Special precautions: Special Precautions Level 3 (q15 min checks) .    Behavioral Health Treatment Plan and Problem List: I have reviewed and approved the Behavioral Health Treatment Plan and Problem list.  The patient has had a chance to review and agrees with the treatment plan.    Copied text in portions of this note has been reviewed and is accurate as of 08/05/24         Clinician:  Harley Burns MD  08/05/24  13:24 EDT

## 2024-08-05 NOTE — PLAN OF CARE
Goal Outcome Evaluation:  Plan of Care Reviewed With: patient  Patient Agreement with Plan of Care: agrees     Progress: improving  Outcome Evaluation: Pt has been calm and cooperative with staff today. Pt reports fair sleep and fair appetite. Pt rates dep and anx 6/6. Pt denies SI/HI/AVH at this time. Pt continues to have weakness and short of breath.

## 2024-08-05 NOTE — DISCHARGE INSTR - APPOINTMENTS
[facility]  [phone]   [address] [place] [address]    August 9 2024 at 9:00am

## 2024-08-05 NOTE — PLAN OF CARE
Goal Outcome Evaluation:  Plan of Care Reviewed With: patient  Patient Agreement with Plan of Care: agrees  Progress: improving  Outcome Evaluation: Therapist met with Patient to review treatment progress; Patient agreeable.      Problem: Adult Behavioral Health Plan of Care  Goal: Plan of Care Review  Outcome: Ongoing, Progressing  Flowsheets  Taken 8/5/2024 1441 by Amparo Solorzano MSW  Progress: improving  Plan of Care Reviewed With: patient  Patient Agreement with Plan of Care: agrees  Outcome Evaluation:   Therapist met with Patient to review treatment progress   Patient agreeable.  Taken 8/3/2024 1411 by Nery Ruiz, W  Consent Given to Review Plan with: Son, Andrés Ross.  Goal: Optimized Coping Skills in Response to Life Stressors  Outcome: Ongoing, Progressing  Flowsheets (Taken 8/5/2024 1441)  Optimized Coping Skills in Response to Life Stressors: making progress toward outcome  Intervention: Promote Effective Coping Strategies  Flowsheets (Taken 8/5/2024 1441)  Supportive Measures:   active listening utilized   counseling provided   goal-setting facilitated   verbalization of feelings encouraged  Goal: Develops/Participates in Therapeutic [place] to Support Successful Transition  Outcome: Ongoing, Progressing  Flowsheets (Taken 8/5/2024 1441)  Develops/Participates in Therapeutic [place] to Support Successful Transition: making progress toward outcome  Intervention: Foster Therapeutic [place]  Flowsheets (Taken 8/5/2024 1441)  Trust Relationship/Rapport:   care explained   thoughts/feelings acknowledged   choices provided   emotional support provided   empathic listening provided   questions answered   questions encouraged   reassurance provided     DATA: Therapist met with Patient individually this date. Patient agreeable to discuss current treatment progress and discharge concerns.     This therapist spoke with Patient's son Andrés. He is agreeable to come in for a family session at  "1pm tomorrow. He expressed frustration as he does not understand how to be supportive of Patient. He states that she has had increased mood swings and agitation since starting chemo and he is unsure of how to be helpful to her when she will not accept his help.     CLINICAL MANUVERING/INTERVENTIONS:  Assisted Patient in processing session content; acknowledged and normalized Patient’s thoughts, feelings, and concerns by utilizing a person-centered approach in efforts to build appropriate rapport and a positive therapeutic relationship with open and honest communication. Allowed Patient to ventilate regarding current stressors and triggers for negative emotions and thoughts in a safe nonjudgmental environment with unconditional positive regard, active listening skills, and empathy.     ASSESSMENT: Patient was seen for a follow up today. She continues to receive treatment for depression. Patient denies SI/HI today. She voices confusion about why she is here. This therapist tried to provide information as to why she Is receiving treatment with us. Patient is hopeful to return home tomorrow. She states that she has just been holding everything in and she thinks that she \"exploded\" on her son. She was encouraged to be more open with her loved ones in order to lean on healthy supports. She states that she and her son have never seen eye to eye and that they always bicker. This therapist offered to hold a family session between the two. She felt that this would be helpful. This therapist tried to reach Patient's son to set this up but I have not been able to reach him today.     Patient was able to identify numerous healthy coping skills including reading, crocheting, and playing with her dog. She states that she does things things daily and that she finds gurvinder in them.       PLAN:   Patient will continue stabilization. Patient will continue to receive services offered by Treatment Team.     Patient will follow-up with [facility] " [place].

## 2024-08-05 NOTE — PAYOR COMM NOTE
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   independent living skills   spiritual/Scientology support  Patient Vulnerabilities: (Recent cancer diagnosis)   lacks insight into illness   poor impulse control   other (see comments)     Problem: Adult Behavioral Health Plan of Care  Goal: Optimized Coping Skills in Response to Life Stressors  Outcome: Ongoing, Progressing  Flowsheets (Taken 8/3/2024 1411)  Optimized Coping Skills in Response to Life Stressors: making progress toward outcome  Intervention: Promote Effective Coping Strategies  Flowsheets (Taken 8/3/2024 1411)  Supportive Measures:   active listening utilized   counseling provided   decision-making supported   positive reinforcement provided   verbalization of feelings encouraged     Problem: Adult Behavioral Health Plan of Care  Goal: Develops/Participates in Therapeutic [place] to Support Successful Transition  Outcome: Ongoing, Progressing  Flowsheets (Taken 8/3/2024 1411)  Develops/Participates in Therapeutic [place] to Support Successful Transition: making progress toward outcome  Intervention: Foster  Therapeutic [place]  Flowsheets (Taken 8/3/2024 1411)  Trust Relationship/Rapport:   questions encouraged   reassurance provided   choices provided   care explained   emotional support provided   thoughts/feelings acknowledged   empathic listening provided   questions answered  Intervention: Mutually Develop Transition Plan  Flowsheets (Taken 8/3/2024 1411)  Outpatient/Agency/Support Group Needs:   outpatient counseling   outpatient psychiatric care (specify)   outpatient medication management  Discharge Coordination/Progress:   Therapist met with patient to complete her discharge needs assessment   patient agreeable.  Transition Support:   community resources reviewed   follow-up care coordinated   follow-up care discussed   crisis management plan promoted   crisis management plan verbalized  Transportation Anticipated: family or friend will provide  Anticipated Discharge Disposition: home or self-care  Transportation Concerns: no car  Current Discharge Risk: psychiatric illness  Concerns to be Addressed:   adjustment to diagnosis/illness   coping/stress   suicidal   medication   mental health  Readmission Within the Last 30 Days: no previous admission in last 30 days  Patient/Family Anticipated Services at Transition:   mental health services   outpatient care  Patient/Family Anticipates Transition to: home     DATA: Therapist met individually with Patient this date for initial evaluation.  Introduced self as Therapist and the role of a positive therapeutic relationship; Patient agreeable.       Therapist encouraged Patient to speak openly and honestly about any issues or stressors during treatment stay. Therapist explained how open communication is significant to providing most effective care.       Therapist completed psychosocial assessment, integrated summary, reviewed care plans, disposition planning and discussed hospitalization expectations and treatment goals this date.      Therapist provided education  regarding different levels of care and is recommending outpatient care for most appropriate aftercare. Patient agreeable. Patient signed consent for CHIRAG Kendrick.      Therapist is recommending family involvement prior to discharge and it's importance. Patient agreeable and signed consent for her son, Andrés Ross.      Therapist spoke with Patient's son, Andrés. He states Patient has been struggling for a few years with several different health diagnoses. He states she has always been very independent and is struggling with losing her independence.  He states he often tries to help her and she just gets aggravated with him.  Andrés states he has offered to let her come to his house and stay and she refuses.     He states he called to check on her yesterday and she told him she was doing fine.  He states he had a funny feeling and went to check on her and found out she had called a friend to bring her to the ER.  He states  she appeared very anxious and nervous, which worried him, and he called an ambulance to come pick her up.  He states he stays worried because she is not honest with him about how she is feeling.     He requested to know how to go about putting her in a nursing home in the future if that is what is needed.  Explained the process with him.     Completed safety planning and Andrés confirmed he will safeguard patient's house to the best possible extent.  Recommended locking up all firearms, knives, medications, razors and any other sharp objects, Andrés stated understanding.     CLINICAL MANUVERING/INTERVENTIONS:  Assisted Patient in processing session content; acknowledged and normalized Patient’s thoughts, feelings, and concerns by utilizing a person-centered approach in efforts to build appropriate rapport and a positive therapeutic relationship with open and honest communication. Allowed Patient to ventilate regarding current stressors and triggers for negative emotions and thoughts in a  safe nonjudgmental environment with unconditional positive regard, active listening skills, and empathy.      ASSESSMENT: Conchita Askew is a 78-year-old  female who presented to the ED with complaints of worsening depression and SI.  Patient was calm and cooperative with assessment.  She states she has been struggling with the diagnosis of lung cancer and has recently started chemotherapy.  Patient states she has always been independent and is struggling with feeling weak and not being able to do things for herself.  She reports feeling like she has been depressed for a while.      PLAN: Patient will receive 24/7 nursing monitoring and daily psychiatrist evaluation by a multidisciplinary team.     Patient will continue stabilization at this time.      Patient is agreeable for outpatient services with [facility].      Public assistance with transportation will not be needed. Family member will provide.

## 2024-08-05 NOTE — THERAPY DISCHARGE NOTE
Acute Care - Physical Therapy Initial Evaluation/Discharge   [place]     Patient Name: Conchita Ross  : 1945  MRN: [id number]  Today's Date: 2024   Onset of Illness/Injury or Date of Surgery: 24     Referring Physician: Ranjana      Admit Date: 2024    Visit Dx:  No diagnosis found.  Patient Active Problem List   Diagnosis    Non-small cell cancer of left lung    MDD (major depressive disorder)     Past Medical History:   Diagnosis Date    Anemia     Arthritis     Asthma     Cancer     squamous cell carinoma lung Left    CHF (congestive heart failure)     Cirrhosis     COPD (chronic obstructive pulmonary disease)     Diabetes mellitus     Disease of thyroid gland     Diverticulitis     Encounter for blood transfusion     Fibromyalgia     Headache     Low back pain     Osteoporosis      Past Surgical History:   Procedure Laterality Date    CHOLECYSTECTOMY      HYSTERECTOMY      PORTACATH PLACEMENT N/A 2024    Procedure: INSERTION OF PORTACATH;  Surgeon: Austen Mendez MD;  Location: [facility];  Service: General;  Laterality: N/A;    SINUS SURGERY         PT Assessment (Last 12 Hours)       PT Evaluation and Treatment       Row Name 24 1457          Physical Therapy Time and Intention    Subjective Information no complaints  -CT     Document Type evaluation;discharge evaluation/summary  -CT     Mode of Treatment physical therapy  -CT     Patient Effort good  -CT     Comment Pt reports she lives at home alone and is independent with use of SC. Pt is ambulating in room with use of SC at time of eval.  -CT       Row Name 24 1457          General Information    Patient Profile Reviewed yes  -CT     Onset of Illness/Injury or Date of Surgery 24  -CT     Referring Physician Ranjana  -CT     Patient Observations alert;cooperative;agree to therapy  -CT     Prior Level of Function independent:;all household mobility;community mobility  -CT     Equipment  Currently Used at Home cane, straight  -CT     Existing Precautions/Restrictions no known precautions/restrictions  -CT     Equipment Issued to Patient gait belt  -CT     Risks Reviewed patient:;LOB;nausea/vomiting;dizziness;increased discomfort;change in vital signs;increased drainage;lines disloged  -CT     Benefits Reviewed patient:;improve function;increase independence;increase strength;increase balance;decrease pain;decrease risk of DVT;improve skin integrity;increase knowledge  -CT     Barriers to Rehab none identified  -CT       Row Name 08/05/24 1457          Living Environment    Current Living Arrangements home  -CT     People in Home alone  -CT     Primary Care Provided by self  -CT       Row Name 08/05/24 1457          Cognition    Affect/Mental Status (Cognition) WNL  -CT     Orientation Status (Cognition) oriented x 4  -CT     Follows Commands (Cognition) WNL  -CT       Row Name 08/05/24 1457          Range of Motion (ROM)    Range of Motion ROM is WFL  -CT       Row Name 08/05/24 1457          Strength (Manual Muscle Testing)    Strength (Manual Muscle Testing) strength is WFL  -CT       Row Name 08/05/24 1457          Bed Mobility    Bed Mobility bed mobility (all) activities  -CT     All Activities, [place] (Bed Mobility) modified independence  -CT     Assistive Device (Bed Mobility) bed rails  -CT       Row Name 08/05/24 1457          Transfers    Transfers sit-stand transfer;stand-sit transfer  -CT       Row Name 08/05/24 1457          Sit-Stand Transfer    Sit-Stand [place] (Transfers) modified independence;standby assist  -CT     Assistive Device (Sit-Stand Transfers) cane, straight  -CT       Row Name 08/05/24 1457          Stand-Sit Transfer    Stand-Sit [place] (Transfers) modified independence;standby assist  -CT     Assistive Device (Stand-Sit Transfers) cane, straight  -CT       Row Name 08/05/24 1458          Gait/Stairs (Locomotion)    Gait/Stairs Locomotion  gait/ambulation independence  -CT     [place] Level (Gait) modified independence;standby assist  -CT     Assistive Device (Gait) cane, straight  -CT     Patient was able to Ambulate yes  -CT     Distance in Feet (Gait) 90  -CT     Pattern (Gait) swing-through  -CT     Deviations/Abnormal Patterns (Gait) gait speed decreased  -CT       Row Name 08/05/24 1457          Balance    Balance Assessment sitting static balance;sitting dynamic balance;standing static balance;standing dynamic balance  -CT     Static Sitting Balance independent  -CT     Dynamic Sitting Balance independent;set-up  -CT     Position, Sitting Balance sitting edge of bed  -CT     Static Standing Balance modified independence;standby assist  -CT     Dynamic Standing Balance contact guard  -CT     Position/Device Used, Standing Balance cane, straight  -CT       Row Name 08/05/24 1457          Coping    Verbalized Emotional State acceptance  -CT       Row Name 08/05/24 1457          Plan of Care Review    Plan of Care Reviewed With patient  -CT       Row Name 08/05/24 1457          Positioning and Restraints    Pre-Treatment Position in bed  -CT     Post Treatment Position bed  -CT     In Bed supine;call light within reach;encouraged to call for assist;exit alarm on;patient within staff view  -CT       Row Name 08/05/24 1457          Therapy Assessment/Plan (PT)    Criteria for Skilled Interventions Met (PT) no;no problems identified which require skilled intervention  -CT     Therapy Frequency (PT) evaluation only  -CT       Row Name 08/05/24 1457          Therapy Plan Review/Discharge Plan (PT)    Therapy Plan Review (PT) evaluation/treatment results reviewed  -CT               User Key  (r) = Recorded By, (t) = Taken By, (c) = Cosigned By      Initials Name Provider Type    Pau Le, PT Physical Therapist                          PT Recommendation and Plan  Therapy Frequency (PT): evaluation only  Plan of Care Reviewed With:  patient         Time Calculation:    PT Charges       Row Name 08/05/24 1502             Time Calculation    PT Received On 08/05/24  -CT      PT Goal Re-Cert Due Date 08/19/24  -CT                User Key  (r) = Recorded By, (t) = Taken By, (c) = Cosigned By      Initials Name Provider Type    CT Pau Aguilar, ТАТЬЯНА Physical Therapist                  Therapy Charges for Today       Code Description Service Date Service Provider Modifiers Qty    15043345127 HC PT EVAL MOD COMPLEXITY 4 8/5/2024 Pau Aguilar, PT GP 1            PT G-Codes  AM-PAC 6 Clicks Score (PT): 21         Pau Aguilar, PT  8/5/2024

## 2024-08-05 NOTE — PLAN OF CARE
Goal Outcome Evaluation:  Plan of Care Reviewed With: patient  Patient Agreement with Plan of Care: agrees     Progress: improving     Pt reports unable to go to sleep and poor appetite. Pt rates anx 5/10 and dep 5/10. Pt denies SI/HI/AVH.    Pt very somatic and restless.

## 2024-08-06 VITALS
HEART RATE: 105 BPM | OXYGEN SATURATION: 92 % | RESPIRATION RATE: 20 BRPM | TEMPERATURE: 98.3 F | SYSTOLIC BLOOD PRESSURE: 115 MMHG | DIASTOLIC BLOOD PRESSURE: 60 MMHG

## 2024-08-06 PROBLEM — F41.9 ANXIETY DISORDER, UNSPECIFIED: Status: ACTIVE | Noted: 2024-08-06

## 2024-08-06 PROBLEM — F32.A DEPRESSION, UNSPECIFIED: Status: ACTIVE | Noted: 2024-08-02

## 2024-08-06 LAB
ANION GAP SERPL CALCULATED.3IONS-SCNC: 10.9 MMOL/L (ref 5–15)
BUN SERPL-MCNC: 14 MG/DL (ref 8–23)
BUN/CREAT SERPL: 31.1 (ref 7–25)
CALCIUM SPEC-SCNC: 8.6 MG/DL (ref 8.6–10.5)
CHLORIDE SERPL-SCNC: 101 MMOL/L (ref 98–107)
CO2 SERPL-SCNC: 23.1 MMOL/L (ref 22–29)
CREAT SERPL-MCNC: 0.45 MG/DL (ref 0.57–1)
EGFRCR SERPLBLD CKD-EPI 2021: 99.2 ML/MIN/1.73
GLUCOSE BLDC GLUCOMTR-MCNC: 112 MG/DL (ref 70–130)
GLUCOSE BLDC GLUCOMTR-MCNC: 168 MG/DL (ref 70–130)
GLUCOSE SERPL-MCNC: 164 MG/DL (ref 65–99)
POTASSIUM SERPL-SCNC: 3.9 MMOL/L (ref 3.5–5.2)
SODIUM SERPL-SCNC: 135 MMOL/L (ref 136–145)

## 2024-08-06 PROCEDURE — 94664 DEMO&/EVAL PT USE INHALER: CPT

## 2024-08-06 PROCEDURE — 94761 N-INVAS EAR/PLS OXIMETRY MLT: CPT

## 2024-08-06 PROCEDURE — 82948 REAGENT STRIP/BLOOD GLUCOSE: CPT

## 2024-08-06 PROCEDURE — 99239 HOSP IP/OBS DSCHRG MGMT >30: CPT | Performed by: PSYCHIATRY & NEUROLOGY

## 2024-08-06 PROCEDURE — 63710000001 INSULIN LISPRO (HUMAN) PER 5 UNITS: Performed by: PSYCHIATRY & NEUROLOGY

## 2024-08-06 PROCEDURE — 80048 BASIC METABOLIC PNL TOTAL CA: CPT | Performed by: PSYCHIATRY & NEUROLOGY

## 2024-08-06 PROCEDURE — 94799 UNLISTED PULMONARY SVC/PX: CPT

## 2024-08-06 RX ORDER — BUSPIRONE HYDROCHLORIDE 5 MG/1
5 TABLET ORAL EVERY 12 HOURS SCHEDULED
Qty: 60 TABLET | Refills: 0 | Status: SHIPPED | OUTPATIENT
Start: 2024-08-06

## 2024-08-06 RX ADMIN — INSULIN LISPRO 3 UNITS: 100 INJECTION, SOLUTION INTRAVENOUS; SUBCUTANEOUS at 07:53

## 2024-08-06 RX ADMIN — Medication 500 MG: at 08:10

## 2024-08-06 RX ADMIN — CETIRIZINE HYDROCHLORIDE 5 MG: 10 TABLET, FILM COATED ORAL at 08:10

## 2024-08-06 RX ADMIN — ALUMINUM HYDROXIDE, MAGNESIUM HYDROXIDE, DIMETHICONE 15 ML: 400; 400; 40 SUSPENSION ORAL at 02:59

## 2024-08-06 RX ADMIN — BUDESONIDE AND FORMOTEROL FUMARATE DIHYDRATE 2 PUFF: 160; 4.5 AEROSOL RESPIRATORY (INHALATION) at 07:36

## 2024-08-06 RX ADMIN — BUSPIRONE HYDROCHLORIDE 5 MG: 5 TABLET ORAL at 08:10

## 2024-08-06 RX ADMIN — LEVOTHYROXINE SODIUM 75 MCG: 0.07 TABLET ORAL at 08:10

## 2024-08-06 NOTE — PLAN OF CARE
Goal Outcome Evaluation:  Plan of Care Reviewed With: patient  Patient Agreement with Plan of Care: agrees         Patient discharged to home.

## 2024-08-06 NOTE — DISCHARGE SUMMARY
:  1945  MRN:  [id number]  Visit Number:  [id number]      Date of Admission:2024   Date of Discharge:  2024    Discharge Diagnosis:  Principal Problem:    Depression, unspecified  Active Problems:    Anxiety disorder, unspecified        Admission Diagnosis:  MDD (major depressive disorder) [F32.9]     KIMMY Ross is a 78 y.o. female who was admitted on 2024 and evaluated on 8-3-2024 with complaints of depression. 78-year-old female  presented to the EDwith complaints of shortness of breath, chest discomfort, increase in anxiety and agitation.   For details please see H&P dated 8/3/24.     Hospital Course  Patient is a 78 y.o. female presented with depression, anxiety, restlessness and agitation. The patient verbalized suicidal ideations in the ED and was admitted to the senior psych unit for safety, further evaluation and treatment. 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 The patient was continued on her home medications. Lasix, spironolactone and potassium was held due to low blood pressure and could be restarted if needed. The patient's QTc was prolonged initially but it improved subsequently. The patient was continued on her home medication doxepin 50 mg to help with depression, anxiety and sleep. Buspirone was added for anxiety and patient reported it helped her rest better. She reported feeling better and denied any thoughts of harming herself. She was not sure if she wanted to continue chemotherapy. She was encouraged to consider all the pros and cons of treatment vs no treatment and make an informed decision. She reported feeling better and had a good family session on the day of discharge and was ready to go home.        Mental Status Exam  "upon discharge:   Mood \"better\"   Affect-congruent, appropriate, stable  Thought Content-goal directed, no delusional material present  Thought process-linear, organized.  Suicidality: No SI  Homicidality: No HI  Perception: No AH/VH    Procedures Performed         Consults:   Consults       No orders found from 7/4/2024 to 8/3/2024.            Pertinent Test Results:   Admission on 08/02/2024   Component Date Value Ref Range Status    Hepatitis B Surface Ag 08/02/2024 Non-Reactive  Non-Reactive Final    Hep A IgM 08/02/2024 Non-Reactive  Non-Reactive Final    Hep B C IgM 08/02/2024 Non-Reactive  Non-Reactive Final    Hepatitis C Ab 08/02/2024 Non-Reactive  Non-Reactive Final    Glucose 08/02/2024 294 (H)  70 - 130 mg/dL Final    Total Cholesterol 08/03/2024 105  0 - 200 mg/dL Final    Triglycerides 08/03/2024 48  0 - 150 mg/dL Final    HDL Cholesterol 08/03/2024 60  40 - 60 mg/dL Final    LDL Cholesterol  08/03/2024 33  0 - 100 mg/dL Final    VLDL Cholesterol 08/03/2024 12  5 - 40 mg/dL Final    LDL/HDL Ratio 08/03/2024 0.59   Final    Hemoglobin A1C 08/03/2024 6.20 (H)  4.80 - 5.60 % Final    Glucose 08/02/2024 258 (H)  70 - 130 mg/dL Final    Glucose 08/03/2024 229 (H)  70 - 130 mg/dL Final    Glucose 08/03/2024 146 (H)  70 - 130 mg/dL Final    Glucose 08/03/2024 195 (H)  70 - 130 mg/dL Final    QT Interval 08/03/2024 308  ms Final    QTC Interval 08/03/2024 447  ms Final    Glucose 08/03/2024 265 (H)  70 - 130 mg/dL Final    TSH 08/04/2024 0.795  0.270 - 4.200 uIU/mL Final    Glucose 08/04/2024 154 (H)  65 - 99 mg/dL Final    BUN 08/04/2024 14  8 - 23 mg/dL Final    Creatinine 08/04/2024 0.51 (L)  0.57 - 1.00 mg/dL Final    Sodium 08/04/2024 137  136 - 145 mmol/L Final    Potassium 08/04/2024 4.0  3.5 - 5.2 mmol/L Final    Slight hemolysis detected by analyzer. Result may be falsely elevated.    Chloride 08/04/2024 106  98 - 107 mmol/L Final    CO2 08/04/2024 24.3  22.0 - 29.0 mmol/L Final    Calcium 08/04/2024 " 7.6 (L)  8.6 - 10.5 mg/dL Final    BUN/Creatinine Ratio 08/04/2024 27.5 (H)  7.0 - 25.0 Final    Anion Gap 08/04/2024 6.7  5.0 - 15.0 mmol/L Final    eGFR 08/04/2024 96.3  >60.0 mL/min/1.73 Final    Magnesium 08/04/2024 2.1  1.6 - 2.4 mg/dL Final    Iron 08/04/2024 238 (H)  37 - 145 mcg/dL Final    Iron Saturation (TSAT) 08/04/2024 80 (H)  20 - 50 % Final    Transferrin 08/04/2024 199 (L)  200 - 360 mg/dL Final    TIBC 08/04/2024 297 (L)  298 - 536 mcg/dL Final    QT Interval 08/04/2024 376  ms Final    QTC Interval 08/04/2024 477  ms Final    Glucose 08/04/2024 156 (H)  70 - 130 mg/dL Final    Glucose 08/04/2024 190 (H)  65 - 99 mg/dL Final    BUN 08/04/2024 16  8 - 23 mg/dL Final    Creatinine 08/04/2024 0.55 (L)  0.57 - 1.00 mg/dL Final    Sodium 08/04/2024 136  136 - 145 mmol/L Final    Potassium 08/04/2024 4.0  3.5 - 5.2 mmol/L Final    Chloride 08/04/2024 105  98 - 107 mmol/L Final    CO2 08/04/2024 24.6  22.0 - 29.0 mmol/L Final    Calcium 08/04/2024 8.3 (L)  8.6 - 10.5 mg/dL Final    BUN/Creatinine Ratio 08/04/2024 29.1 (H)  7.0 - 25.0 Final    Anion Gap 08/04/2024 6.4  5.0 - 15.0 mmol/L Final    eGFR 08/04/2024 94.5  >60.0 mL/min/1.73 Final    Glucose 08/04/2024 166 (H)  70 - 130 mg/dL Final    Glucose 08/04/2024 175 (H)  70 - 130 mg/dL Final    Glucose 08/04/2024 190 (H)  70 - 130 mg/dL Final    Glucose 08/05/2024 152 (H)  65 - 99 mg/dL Final    BUN 08/05/2024 16  8 - 23 mg/dL Final    Creatinine 08/05/2024 0.59  0.57 - 1.00 mg/dL Final    Sodium 08/05/2024 136  136 - 145 mmol/L Final    Potassium 08/05/2024 4.2  3.5 - 5.2 mmol/L Final    Chloride 08/05/2024 105  98 - 107 mmol/L Final    CO2 08/05/2024 25.3  22.0 - 29.0 mmol/L Final    Calcium 08/05/2024 8.2 (L)  8.6 - 10.5 mg/dL Final    BUN/Creatinine Ratio 08/05/2024 27.1 (H)  7.0 - 25.0 Final    Anion Gap 08/05/2024 5.7  5.0 - 15.0 mmol/L Final    eGFR 08/05/2024 93.0  >60.0 mL/min/1.73 Final    Glucose 08/05/2024 157 (H)  70 - 130 mg/dL Final     Glucose 08/05/2024 186 (H)  70 - 130 mg/dL Final    Glucose 08/05/2024 217 (H)  70 - 130 mg/dL Final    Glucose 08/05/2024 161 (H)  70 - 130 mg/dL Final    Glucose 08/06/2024 164 (H)  65 - 99 mg/dL Final    BUN 08/06/2024 14  8 - 23 mg/dL Final    Creatinine 08/06/2024 0.45 (L)  0.57 - 1.00 mg/dL Final    Sodium 08/06/2024 135 (L)  136 - 145 mmol/L Final    Potassium 08/06/2024 3.9  3.5 - 5.2 mmol/L Final    Chloride 08/06/2024 101  98 - 107 mmol/L Final    CO2 08/06/2024 23.1  22.0 - 29.0 mmol/L Final    Calcium 08/06/2024 8.6  8.6 - 10.5 mg/dL Final    BUN/Creatinine Ratio 08/06/2024 31.1 (H)  7.0 - 25.0 Final    Anion Gap 08/06/2024 10.9  5.0 - 15.0 mmol/L Final    eGFR 08/06/2024 99.2  >60.0 mL/min/1.73 Final    Glucose 08/06/2024 168 (H)  70 - 130 mg/dL Final    Glucose 08/06/2024 112  70 - 130 mg/dL Final   Admission on 08/02/2024, Discharged on 08/02/2024   Component Date Value Ref Range Status    QT Interval 08/02/2024 434  ms Final    QTC Interval 08/02/2024 610  ms Final    HS Troponin T 08/02/2024 <6  <14 ng/L Final    Glucose 08/02/2024 248 (H)  65 - 99 mg/dL Final    BUN 08/02/2024 11  8 - 23 mg/dL Final    Creatinine 08/02/2024 0.63  0.57 - 1.00 mg/dL Final    Sodium 08/02/2024 141  136 - 145 mmol/L Final    Potassium 08/02/2024 3.9  3.5 - 5.2 mmol/L Final    Chloride 08/02/2024 105  98 - 107 mmol/L Final    CO2 08/02/2024 22.2  22.0 - 29.0 mmol/L Final    Calcium 08/02/2024 8.3 (L)  8.6 - 10.5 mg/dL Final    Total Protein 08/02/2024 6.8  6.0 - 8.5 g/dL Final    Albumin 08/02/2024 3.1 (L)  3.5 - 5.2 g/dL Final    ALT (SGPT) 08/02/2024 19  1 - 33 U/L Final    AST (SGOT) 08/02/2024 41 (H)  1 - 32 U/L Final    Alkaline Phosphatase 08/02/2024 175 (H)  39 - 117 U/L Final    Total Bilirubin 08/02/2024 2.4 (H)  0.0 - 1.2 mg/dL Final    Globulin 08/02/2024 3.7  gm/dL Final    A/G Ratio 08/02/2024 0.8  g/dL Final    BUN/Creatinine Ratio 08/02/2024 17.5  7.0 - 25.0 Final    Anion Gap 08/02/2024 13.8  5.0 - 15.0  mmol/L Final    eGFR 08/02/2024 91.5  >60.0 mL/min/1.73 Final    Lipase 08/02/2024 11 (L)  13 - 60 U/L Final    Color, UA 08/02/2024 Yellow  Yellow, Straw Final    Appearance, UA 08/02/2024 Clear  Clear Final    pH, UA 08/02/2024 7.0  5.0 - 8.0 Final    Specific Gravity, UA 08/02/2024 1.010  1.005 - 1.030 Final    Glucose, UA 08/02/2024 250 mg/dL (1+) (A)  Negative Final    Ketones, UA 08/02/2024 Negative  Negative Final    Bilirubin, UA 08/02/2024 Negative  Negative Final    Blood, UA 08/02/2024 Negative  Negative Final    Protein, UA 08/02/2024 Negative  Negative Final    Leuk Esterase, UA 08/02/2024 Negative  Negative Final    Nitrite, UA 08/02/2024 Negative  Negative Final    Urobilinogen, UA 08/02/2024 2.0 E.U./dL (A)  0.2 - 1.0 E.U./dL Final    proBNP 08/02/2024 284.3  0.0 - 1,800.0 pg/mL Final    C-Reactive Protein 08/02/2024 2.87 (H)  0.00 - 0.50 mg/dL Final    THC, Screen, Urine 08/02/2024 Negative  Negative Final    Phencyclidine (PCP), Urine 08/02/2024 Negative  Negative Final    Cocaine Screen, Urine 08/02/2024 Negative  Negative Final    Methamphetamine, Ur 08/02/2024 Negative  Negative Final    Opiate Screen 08/02/2024 Negative  Negative Final    Amphetamine Screen, Urine 08/02/2024 Negative  Negative Final    Benzodiazepine Screen, Urine 08/02/2024 Negative  Negative Final    Tricyclic Antidepressants Screen 08/02/2024 Positive (A)  Negative Final    Methadone Screen, Urine 08/02/2024 Negative  Negative Final    Barbiturates Screen, Urine 08/02/2024 Negative  Negative Final    Oxycodone Screen, Urine 08/02/2024 Negative  Negative Final    Buprenorphine, Screen, Urine 08/02/2024 Negative  Negative Final    Ammonia 08/02/2024 33  11 - 51 umol/L Final    WBC 08/02/2024 7.08  3.40 - 10.80 10*3/mm3 Final    RBC 08/02/2024 3.62 (L)  3.77 - 5.28 10*6/mm3 Final    Hemoglobin 08/02/2024 9.7 (L)  12.0 - 15.9 g/dL Final    Hematocrit 08/02/2024 32.1 (L)  34.0 - 46.6 % Final    MCV 08/02/2024 88.7  79.0 - 97.0 fL  Final    MCH 08/02/2024 26.8  26.6 - 33.0 pg Final    MCHC 08/02/2024 30.2 (L)  31.5 - 35.7 g/dL Final    RDW 08/02/2024 15.0  12.3 - 15.4 % Final    RDW-SD 08/02/2024 48.6  37.0 - 54.0 fl Final    MPV 08/02/2024 10.8  6.0 - 12.0 fL Final    Platelets 08/02/2024 170  140 - 450 10*3/mm3 Final    Neutrophil % 08/02/2024 89.8 (H)  42.7 - 76.0 % Final    Lymphocyte % 08/02/2024 3.5 (L)  19.6 - 45.3 % Final    Monocyte % 08/02/2024 4.9 (L)  5.0 - 12.0 % Final    Eosinophil % 08/02/2024 0.0 (L)  0.3 - 6.2 % Final    Basophil % 08/02/2024 0.1  0.0 - 1.5 % Final    Immature Grans % 08/02/2024 1.7 (H)  0.0 - 0.5 % Final    Neutrophils, Absolute 08/02/2024 6.35  1.70 - 7.00 10*3/mm3 Final    Lymphocytes, Absolute 08/02/2024 0.25 (L)  0.70 - 3.10 10*3/mm3 Final    Monocytes, Absolute 08/02/2024 0.35  0.10 - 0.90 10*3/mm3 Final    Eosinophils, Absolute 08/02/2024 0.00  0.00 - 0.40 10*3/mm3 Final    Basophils, Absolute 08/02/2024 0.01  0.00 - 0.20 10*3/mm3 Final    Immature Grans, Absolute 08/02/2024 0.12 (H)  0.00 - 0.05 10*3/mm3 Final    nRBC 08/02/2024 0.0  0.0 - 0.2 /100 WBC Final    Extra Tube 08/02/2024 Hold for add-ons.   Final    Auto resulted.    Extra Tube 08/02/2024 hold for add-on   Final    Auto resulted    Extra Tube 08/02/2024 Hold for add-ons.   Final    Auto resulted.    Extra Tube 08/02/2024 Hold for add-ons.   Final    Auto resulted    HS Troponin T 08/02/2024 <6  <14 ng/L Final    Troponin T Delta 08/02/2024    Final    Unable to calculate.    Fentanyl, Urine 08/02/2024 Negative  Negative Final   Infusion on 08/01/2024   Component Date Value Ref Range Status    Glucose 08/01/2024 130 (H)  65 - 99 mg/dL Final    BUN 08/01/2024 8  8 - 23 mg/dL Final    Creatinine 08/01/2024 0.60  0.57 - 1.00 mg/dL Final    Sodium 08/01/2024 139  136 - 145 mmol/L Final    Potassium 08/01/2024 3.1 (L)  3.5 - 5.2 mmol/L Final    Chloride 08/01/2024 101  98 - 107 mmol/L Final    CO2 08/01/2024 28.8  22.0 - 29.0 mmol/L Final     Calcium 08/01/2024 8.7  8.6 - 10.5 mg/dL Final    Total Protein 08/01/2024 7.0  6.0 - 8.5 g/dL Final    Albumin 08/01/2024 3.1 (L)  3.5 - 5.2 g/dL Final    ALT (SGPT) 08/01/2024 16  1 - 33 U/L Final    AST (SGOT) 08/01/2024 30  1 - 32 U/L Final    Alkaline Phosphatase 08/01/2024 178 (H)  39 - 117 U/L Final    Total Bilirubin 08/01/2024 1.7 (H)  0.0 - 1.2 mg/dL Final    Globulin 08/01/2024 3.9  gm/dL Final    A/G Ratio 08/01/2024 0.8  g/dL Final    BUN/Creatinine Ratio 08/01/2024 13.3  7.0 - 25.0 Final    Anion Gap 08/01/2024 9.2  5.0 - 15.0 mmol/L Final    eGFR 08/01/2024 92.6  >60.0 mL/min/1.73 Final    Magnesium 08/01/2024 2.0  1.6 - 2.4 mg/dL Final    Glucose 08/01/2024 206 (H)  70 - 130 mg/dL Final   Office Visit on 07/31/2024   Component Date Value Ref Range Status    Magnesium 07/31/2024 1.9  1.6 - 2.4 mg/dL Final   Lab on 07/31/2024   Component Date Value Ref Range Status    Glucose 07/31/2024 110 (H)  65 - 99 mg/dL Final    BUN 07/31/2024 8  8 - 23 mg/dL Final    Creatinine 07/31/2024 0.62  0.57 - 1.00 mg/dL Final    Sodium 07/31/2024 136  136 - 145 mmol/L Final    Potassium 07/31/2024 2.4 (C)  3.5 - 5.2 mmol/L Final    Chloride 07/31/2024 97 (L)  98 - 107 mmol/L Final    CO2 07/31/2024 29.0  22.0 - 29.0 mmol/L Final    Calcium 07/31/2024 8.7  8.6 - 10.5 mg/dL Final    Total Protein 07/31/2024 7.1  6.0 - 8.5 g/dL Final    Albumin 07/31/2024 3.2 (L)  3.5 - 5.2 g/dL Final    ALT (SGPT) 07/31/2024 17  1 - 33 U/L Final    AST (SGOT) 07/31/2024 34 (H)  1 - 32 U/L Final    Alkaline Phosphatase 07/31/2024 189 (H)  39 - 117 U/L Final    Total Bilirubin 07/31/2024 1.8 (H)  0.0 - 1.2 mg/dL Final    Globulin 07/31/2024 3.9  gm/dL Final    A/G Ratio 07/31/2024 0.8  g/dL Final    BUN/Creatinine Ratio 07/31/2024 12.9  7.0 - 25.0 Final    Anion Gap 07/31/2024 10.0  5.0 - 15.0 mmol/L Final    eGFR 07/31/2024 91.9  >60.0 mL/min/1.73 Final    TSH 07/31/2024 3.170  0.270 - 4.200 uIU/mL Final    Free T4 07/31/2024 1.50  0.93 -  1.70 ng/dL Final    WBC 07/31/2024 7.37  3.40 - 10.80 10*3/mm3 Final    RBC 07/31/2024 3.93  3.77 - 5.28 10*6/mm3 Final    Hemoglobin 07/31/2024 10.8 (L)  12.0 - 15.9 g/dL Final    Hematocrit 07/31/2024 34.2  34.0 - 46.6 % Final    MCV 07/31/2024 87.0  79.0 - 97.0 fL Final    MCH 07/31/2024 27.5  26.6 - 33.0 pg Final    MCHC 07/31/2024 31.6  31.5 - 35.7 g/dL Final    RDW 07/31/2024 15.1  12.3 - 15.4 % Final    RDW-SD 07/31/2024 48.8  37.0 - 54.0 fl Final    MPV 07/31/2024 10.0  6.0 - 12.0 fL Final    Platelets 07/31/2024 209  140 - 450 10*3/mm3 Final    Neutrophil % 07/31/2024 73.0  42.7 - 76.0 % Final    Lymphocyte % 07/31/2024 13.3 (L)  19.6 - 45.3 % Final    Monocyte % 07/31/2024 10.0  5.0 - 12.0 % Final    Eosinophil % 07/31/2024 2.4  0.3 - 6.2 % Final    Basophil % 07/31/2024 0.9  0.0 - 1.5 % Final    Immature Grans % 07/31/2024 0.4  0.0 - 0.5 % Final    Neutrophils, Absolute 07/31/2024 5.37  1.70 - 7.00 10*3/mm3 Final    Lymphocytes, Absolute 07/31/2024 0.98  0.70 - 3.10 10*3/mm3 Final    Monocytes, Absolute 07/31/2024 0.74  0.10 - 0.90 10*3/mm3 Final    Eosinophils, Absolute 07/31/2024 0.18  0.00 - 0.40 10*3/mm3 Final    Basophils, Absolute 07/31/2024 0.07  0.00 - 0.20 10*3/mm3 Final    Immature Grans, Absolute 07/31/2024 0.03  0.00 - 0.05 10*3/mm3 Final    nRBC 07/31/2024 0.0  0.0 - 0.2 /100 WBC Final   Admission on 07/25/2024, Discharged on 07/25/2024   Component Date Value Ref Range Status    Glucose 07/25/2024 132 (H)  70 - 130 mg/dL Final   Lab on 07/22/2024   Component Date Value Ref Range Status    Glucose 07/22/2024 129 (H)  65 - 99 mg/dL Final    BUN 07/22/2024 11  8 - 23 mg/dL Final    Creatinine 07/22/2024 0.63  0.57 - 1.00 mg/dL Final    Sodium 07/22/2024 136  136 - 145 mmol/L Final    Potassium 07/22/2024 3.3 (L)  3.5 - 5.2 mmol/L Final    Chloride 07/22/2024 98  98 - 107 mmol/L Final    CO2 07/22/2024 26.1  22.0 - 29.0 mmol/L Final    Calcium 07/22/2024 8.9  8.6 - 10.5 mg/dL Final    Total  Protein 07/22/2024 7.3  6.0 - 8.5 g/dL Final    Albumin 07/22/2024 3.3 (L)  3.5 - 5.2 g/dL Final    ALT (SGPT) 07/22/2024 14  1 - 33 U/L Final    AST (SGOT) 07/22/2024 32  1 - 32 U/L Final    Alkaline Phosphatase 07/22/2024 201 (H)  39 - 117 U/L Final    Total Bilirubin 07/22/2024 1.6 (H)  0.0 - 1.2 mg/dL Final    Globulin 07/22/2024 4.0  gm/dL Final    A/G Ratio 07/22/2024 0.8  g/dL Final    BUN/Creatinine Ratio 07/22/2024 17.5  7.0 - 25.0 Final    Anion Gap 07/22/2024 11.9  5.0 - 15.0 mmol/L Final    eGFR 07/22/2024 91.5  >60.0 mL/min/1.73 Final    WBC 07/22/2024 6.38  3.40 - 10.80 10*3/mm3 Final    RBC 07/22/2024 3.71 (L)  3.77 - 5.28 10*6/mm3 Final    Hemoglobin 07/22/2024 10.2 (L)  12.0 - 15.9 g/dL Final    Hematocrit 07/22/2024 33.3 (L)  34.0 - 46.6 % Final    MCV 07/22/2024 89.8  79.0 - 97.0 fL Final    MCH 07/22/2024 27.5  26.6 - 33.0 pg Final    MCHC 07/22/2024 30.6 (L)  31.5 - 35.7 g/dL Final    RDW 07/22/2024 15.4  12.3 - 15.4 % Final    RDW-SD 07/22/2024 50.4  37.0 - 54.0 fl Final    MPV 07/22/2024 10.1  6.0 - 12.0 fL Final    Platelets 07/22/2024 187  140 - 450 10*3/mm3 Final    Neutrophil % 07/22/2024 73.7  42.7 - 76.0 % Final    Lymphocyte % 07/22/2024 12.2 (L)  19.6 - 45.3 % Final    Monocyte % 07/22/2024 11.0  5.0 - 12.0 % Final    Eosinophil % 07/22/2024 2.2  0.3 - 6.2 % Final    Basophil % 07/22/2024 0.6  0.0 - 1.5 % Final    Immature Grans % 07/22/2024 0.3  0.0 - 0.5 % Final    Neutrophils, Absolute 07/22/2024 4.70  1.70 - 7.00 10*3/mm3 Final    Lymphocytes, Absolute 07/22/2024 0.78  0.70 - 3.10 10*3/mm3 Final    Monocytes, Absolute 07/22/2024 0.70  0.10 - 0.90 10*3/mm3 Final    Eosinophils, Absolute 07/22/2024 0.14  0.00 - 0.40 10*3/mm3 Final    Basophils, Absolute 07/22/2024 0.04  0.00 - 0.20 10*3/mm3 Final    Immature Grans, Absolute 07/22/2024 0.02  0.00 - 0.05 10*3/mm3 Final    nRBC 07/22/2024 0.0  0.0 - 0.2 /100 WBC Final    Protime 07/22/2024 15.3 (H)  12.1 - 14.7 Seconds Final    INR  07/22/2024 1.19 (H)  0.90 - 1.10 Final        Condition on Discharge:  improved    Vital Signs  Temp:  [98.3 °F (36.8 °C)-99.8 °F (37.7 °C)] 98.3 °F (36.8 °C)  Heart Rate:  [105-135] 105  Resp:  [20-24] 20  BP: ()/(48-60) 115/60      Discharge Disposition:  Home or Self Care    Discharge Medications:     Discharge Medications        New Medications        Instructions Start Date   busPIRone 5 MG tablet  Commonly known as: BUSPAR   5 mg, Oral, Every 12 Hours Scheduled             Continue These Medications        Instructions Start Date   albuterol (2.5 MG/3ML) 0.083% nebulizer solution  Commonly known as: PROVENTIL   2.5 mg, Nebulization, Every 4 Hours PRN      albuterol sulfate  (90 Base) MCG/ACT inhaler  Commonly known as: PROVENTIL HFA;VENTOLIN HFA;PROAIR HFA   2 puffs, Inhalation, Every 4 Hours PRN      Breztri Aerosphere 160-9-4.8 MCG/ACT aerosol inhaler  Generic drug: Budeson-Glycopyrrol-Formoterol   2 puffs, Inhalation, 2 Times Daily      doxepin 50 MG capsule  Commonly known as: SINEquan   50 mg, Oral, Nightly      lactulose 10 GM/15ML solution  Commonly known as: CHRONULAC   20 g, Oral, 3 Times Daily      levocetirizine 5 MG tablet  Commonly known as: XYZAL   5 mg, Oral, Nightly      levothyroxine 75 MCG tablet  Commonly known as: SYNTHROID, LEVOTHROID   75 mcg, Oral, Daily      lidocaine-prilocaine 2.5-2.5 % cream  Commonly known as: EMLA   Apply to port-a-cath site 30 minutes prior to arrival at infusion center. Cover with plastic wrap.      montelukast 10 MG tablet  Commonly known as: SINGULAIR   10 mg, Oral, Nightly      OLANZapine 5 MG tablet  Commonly known as: zyPREXA   5 mg, Oral, Nightly, Take nightly x 4 starting night of chemotherapy.      ondansetron 8 MG tablet  Commonly known as: ZOFRAN   8 mg, Oral, 3 Times Daily PRN      pantoprazole 40 MG EC tablet  Commonly known as: PROTONIX   40 mg, Oral, Daily      traMADol 50 MG tablet  Commonly known as: ULTRAM   50 mg, Oral, Daily PRN       Tresiba FlexTouch 100 UNIT/ML solution pen-injector injection  Generic drug: insulin degludec   8 Units, Subcutaneous, Nightly             Stop These Medications      famotidine 40 MG tablet  Commonly known as: PEPCID     furosemide 20 MG tablet  Commonly known as: LASIX     mupirocin 2 % ointment  Commonly known as: BACTROBAN     potassium chloride 20 MEQ CR tablet  Commonly known as: KLOR-CON M20     prochlorperazine 10 MG tablet  Commonly known as: COMPAZINE     spironolactone 25 MG tablet  Commonly known as: ALDACTONE              Discharge Diet: consistent carbohydrate     Activity at Discharge: As tolerated     Follow-up Appointments  Future Appointments   Date Time Provider Department Center   8/8/2024  3:30 PM COR MRI 1 [facility] MRI COR   8/22/2024  8:00 AM TOY NURSE LAB [facility]   8/22/2024  8:30 AM Rosenda Cassidy MD [facility]   8/22/2024  9:00 AM [facility] OP INFUS CHAIR 27  [facility] INF COR   9/12/2024  7:45 AM [facility] OP INFUS CHAIR 27  COR INF COR   9/12/2024  7:45 AM LAB DRAW COR OP INFUSION  [facility] INF COR       Time: I spent  > 30  minutes on this discharge activity which included: face-to-face encounter with the patient, reviewing the data in the system, coordination of the care with the nursing staff as well as consultants, documentation, and entering orders.        Clinician:   Harley Burns MD  08/06/24  14:58 EDT

## 2024-08-06 NOTE — PLAN OF CARE
Goal Outcome Evaluation:  Plan of Care Reviewed With: patient  Patient Agreement with Plan of Care: agrees     Progress: no change     Pt reports good sleep and poor appetite. Pt rates anx 5/10 and dep 5/10. Pt denies SI/HI/AVH.
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
## 2024-08-06 NOTE — NURSING NOTE
Pharmacy recommended to hold doxepin 50 mg r/t blood pressure.    Dr Chilel called and notifed of recommendation, new order for doxepin 10 mg one time tonight.

## 2024-08-07 ENCOUNTER — TELEPHONE (OUTPATIENT)
Dept: ONCOLOGY | Facility: CLINIC | Age: 79
End: 2024-08-07
Payer: MEDICARE

## 2024-08-07 NOTE — TELEPHONE ENCOUNTER
Pt says that she doesn't want to do MRI and not sure that she doesn't want to do the chemo treatments. She said, she just got out of the  hospital here at [place]. She also requested that if she doesn't answer the phone that she doesn't want anyone to call her son.

## 2024-08-07 NOTE — PAYOR COMM NOTE
"Conchita Gomez (78 y.o. Female)       Date of Birth   1945    Social Security Number       Address   [address] [place] [address]    Home Phone   [phone]    MRN   [id number]       Sikhism   [facility]    Marital Status                               Admission Date   8/2/24    Admission Type   Emergency    Admitting Provider   Adonis Ayala MD    Attending Provider       Department, Room/Bed   [facility], 1023/2S       Discharge Date   8/6/2024    Discharge Disposition   Home or Self Care    Discharge Destination                                 Attending Provider: (none)   Allergies: Ciprofloxacin, Sertraline    Isolation: None   Infection: None   Code Status: Prior    Ht: 167.6 cm (65.98\")   Wt: 58.1 kg (128 lb)    Admission Cmt: None   Principal Problem: Depression, unspecified [F32.A]                   Active Insurance as of 8/2/2024       Primary Coverage       Payor Plan Insurance Group Employer/Plan Group    ANTHEM MEDICARE REPLACEMENT ANTHEM MEDICARE ADVANTAGE [id number]       Payor Plan Address Payor Plan Phone Number Payor Plan Fax Number Effective Dates    [address] [phone]  1/1/2024 - None Entered    [facility] [address]         Subscriber Name Subscriber Birth Date Member ID       CONCHITA GOMEZ 1945 [id number]               Secondary Coverage       Payor Plan Insurance Group Employer/Plan Group    [employer] [place] WELL[employer]ARE MEDICAID        Payor Plan Address Payor Plan Phone Number Payor Plan Fax Number Effective Dates    [address] [address] [phone]  6/20/2016 - None Entered    [facility] [address]         Subscriber Name Subscriber Birth Date Member ID       CONCHITA GOMEZ 1945 [id number]                     Emergency Contacts        (Rel.) Home Phone Work Phone Mobile Phone    Andrés Gomez (Son) -- -- [phone]                 Discharge Summary        Harley Burns MD at 08/06/24 1458  "         :  1945  MRN:  [id number]  Visit Number:  [id number]      Date of Admission:2024   Date of Discharge:  2024    Discharge Diagnosis:  Principal Problem:    Depression, unspecified  Active Problems:    Anxiety disorder, unspecified        Admission Diagnosis:  MDD (major depressive disorder) [F32.9]     KIMMY Ross is a 78 y.o. female who was admitted on 2024 and evaluated on 8-3-2024 with complaints of depression. 78-year-old female  presented to the EDwith complaints of shortness of breath, chest discomfort, increase in anxiety and agitation.   For details please see H&P dated 8/3/24.     Hospital Course  Patient is a 78 y.o. female presented with depression, anxiety, restlessness and agitation. The patient verbalized suicidal ideations in the ED and was admitted to the senior psych unit for safety, further evaluation and treatment. 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 The patient was continued on her home medications. Lasix, spironolactone and potassium was held due to low blood pressure and could be restarted if needed. The patient's QTc was prolonged initially but it improved subsequently. The patient was continued on her home medication doxepin 50 mg to help with depression, anxiety and sleep. Buspirone was added for anxiety and patient reported it helped her rest better. She reported feeling better and denied any thoughts of harming herself. She was not sure if she wanted to continue chemotherapy. She was encouraged to consider all the pros and cons of treatment vs no treatment and make an informed decision. She reported feeling better and had a good family session on the day of discharge and was ready to go home.        Mental Status  "Exam upon discharge:   Mood \"better\"   Affect-congruent, appropriate, stable  Thought Content-goal directed, no delusional material present  Thought process-linear, organized.  Suicidality: No SI  Homicidality: No HI  Perception: No AH/VH    Procedures Performed         Consults:   Consults       No orders found from 7/4/2024 to 8/3/2024.            Pertinent Test Results:   Admission on 08/02/2024   Component Date Value Ref Range Status    Hepatitis B Surface Ag 08/02/2024 Non-Reactive  Non-Reactive Final    Hep A IgM 08/02/2024 Non-Reactive  Non-Reactive Final    Hep B C IgM 08/02/2024 Non-Reactive  Non-Reactive Final    Hepatitis C Ab 08/02/2024 Non-Reactive  Non-Reactive Final    Glucose 08/02/2024 294 (H)  70 - 130 mg/dL Final    Total Cholesterol 08/03/2024 105  0 - 200 mg/dL Final    Triglycerides 08/03/2024 48  0 - 150 mg/dL Final    HDL Cholesterol 08/03/2024 60  40 - 60 mg/dL Final    LDL Cholesterol  08/03/2024 33  0 - 100 mg/dL Final    VLDL Cholesterol 08/03/2024 12  5 - 40 mg/dL Final    LDL/HDL Ratio 08/03/2024 0.59   Final    Hemoglobin A1C 08/03/2024 6.20 (H)  4.80 - 5.60 % Final    Glucose 08/02/2024 258 (H)  70 - 130 mg/dL Final    Glucose 08/03/2024 229 (H)  70 - 130 mg/dL Final    Glucose 08/03/2024 146 (H)  70 - 130 mg/dL Final    Glucose 08/03/2024 195 (H)  70 - 130 mg/dL Final    QT Interval 08/03/2024 308  ms Final    QTC Interval 08/03/2024 447  ms Final    Glucose 08/03/2024 265 (H)  70 - 130 mg/dL Final    TSH 08/04/2024 0.795  0.270 - 4.200 uIU/mL Final    Glucose 08/04/2024 154 (H)  65 - 99 mg/dL Final    BUN 08/04/2024 14  8 - 23 mg/dL Final    Creatinine 08/04/2024 0.51 (L)  0.57 - 1.00 mg/dL Final    Sodium 08/04/2024 137  136 - 145 mmol/L Final    Potassium 08/04/2024 4.0  3.5 - 5.2 mmol/L Final    Slight hemolysis detected by analyzer. Result may be falsely elevated.    Chloride 08/04/2024 106  98 - 107 mmol/L Final    CO2 08/04/2024 24.3  22.0 - 29.0 mmol/L Final    Calcium " 08/04/2024 7.6 (L)  8.6 - 10.5 mg/dL Final    BUN/Creatinine Ratio 08/04/2024 27.5 (H)  7.0 - 25.0 Final    Anion Gap 08/04/2024 6.7  5.0 - 15.0 mmol/L Final    eGFR 08/04/2024 96.3  >60.0 mL/min/1.73 Final    Magnesium 08/04/2024 2.1  1.6 - 2.4 mg/dL Final    Iron 08/04/2024 238 (H)  37 - 145 mcg/dL Final    Iron Saturation (TSAT) 08/04/2024 80 (H)  20 - 50 % Final    Transferrin 08/04/2024 199 (L)  200 - 360 mg/dL Final    TIBC 08/04/2024 297 (L)  298 - 536 mcg/dL Final    QT Interval 08/04/2024 376  ms Final    QTC Interval 08/04/2024 477  ms Final    Glucose 08/04/2024 156 (H)  70 - 130 mg/dL Final    Glucose 08/04/2024 190 (H)  65 - 99 mg/dL Final    BUN 08/04/2024 16  8 - 23 mg/dL Final    Creatinine 08/04/2024 0.55 (L)  0.57 - 1.00 mg/dL Final    Sodium 08/04/2024 136  136 - 145 mmol/L Final    Potassium 08/04/2024 4.0  3.5 - 5.2 mmol/L Final    Chloride 08/04/2024 105  98 - 107 mmol/L Final    CO2 08/04/2024 24.6  22.0 - 29.0 mmol/L Final    Calcium 08/04/2024 8.3 (L)  8.6 - 10.5 mg/dL Final    BUN/Creatinine Ratio 08/04/2024 29.1 (H)  7.0 - 25.0 Final    Anion Gap 08/04/2024 6.4  5.0 - 15.0 mmol/L Final    eGFR 08/04/2024 94.5  >60.0 mL/min/1.73 Final    Glucose 08/04/2024 166 (H)  70 - 130 mg/dL Final    Glucose 08/04/2024 175 (H)  70 - 130 mg/dL Final    Glucose 08/04/2024 190 (H)  70 - 130 mg/dL Final    Glucose 08/05/2024 152 (H)  65 - 99 mg/dL Final    BUN 08/05/2024 16  8 - 23 mg/dL Final    Creatinine 08/05/2024 0.59  0.57 - 1.00 mg/dL Final    Sodium 08/05/2024 136  136 - 145 mmol/L Final    Potassium 08/05/2024 4.2  3.5 - 5.2 mmol/L Final    Chloride 08/05/2024 105  98 - 107 mmol/L Final    CO2 08/05/2024 25.3  22.0 - 29.0 mmol/L Final    Calcium 08/05/2024 8.2 (L)  8.6 - 10.5 mg/dL Final    BUN/Creatinine Ratio 08/05/2024 27.1 (H)  7.0 - 25.0 Final    Anion Gap 08/05/2024 5.7  5.0 - 15.0 mmol/L Final    eGFR 08/05/2024 93.0  >60.0 mL/min/1.73 Final    Glucose 08/05/2024 157 (H)  70 - 130 mg/dL  Final    Glucose 08/05/2024 186 (H)  70 - 130 mg/dL Final    Glucose 08/05/2024 217 (H)  70 - 130 mg/dL Final    Glucose 08/05/2024 161 (H)  70 - 130 mg/dL Final    Glucose 08/06/2024 164 (H)  65 - 99 mg/dL Final    BUN 08/06/2024 14  8 - 23 mg/dL Final    Creatinine 08/06/2024 0.45 (L)  0.57 - 1.00 mg/dL Final    Sodium 08/06/2024 135 (L)  136 - 145 mmol/L Final    Potassium 08/06/2024 3.9  3.5 - 5.2 mmol/L Final    Chloride 08/06/2024 101  98 - 107 mmol/L Final    CO2 08/06/2024 23.1  22.0 - 29.0 mmol/L Final    Calcium 08/06/2024 8.6  8.6 - 10.5 mg/dL Final    BUN/Creatinine Ratio 08/06/2024 31.1 (H)  7.0 - 25.0 Final    Anion Gap 08/06/2024 10.9  5.0 - 15.0 mmol/L Final    eGFR 08/06/2024 99.2  >60.0 mL/min/1.73 Final    Glucose 08/06/2024 168 (H)  70 - 130 mg/dL Final    Glucose 08/06/2024 112  70 - 130 mg/dL Final   Admission on 08/02/2024, Discharged on 08/02/2024   Component Date Value Ref Range Status    QT Interval 08/02/2024 434  ms Final    QTC Interval 08/02/2024 610  ms Final    HS Troponin T 08/02/2024 <6  <14 ng/L Final    Glucose 08/02/2024 248 (H)  65 - 99 mg/dL Final    BUN 08/02/2024 11  8 - 23 mg/dL Final    Creatinine 08/02/2024 0.63  0.57 - 1.00 mg/dL Final    Sodium 08/02/2024 141  136 - 145 mmol/L Final    Potassium 08/02/2024 3.9  3.5 - 5.2 mmol/L Final    Chloride 08/02/2024 105  98 - 107 mmol/L Final    CO2 08/02/2024 22.2  22.0 - 29.0 mmol/L Final    Calcium 08/02/2024 8.3 (L)  8.6 - 10.5 mg/dL Final    Total Protein 08/02/2024 6.8  6.0 - 8.5 g/dL Final    Albumin 08/02/2024 3.1 (L)  3.5 - 5.2 g/dL Final    ALT (SGPT) 08/02/2024 19  1 - 33 U/L Final    AST (SGOT) 08/02/2024 41 (H)  1 - 32 U/L Final    Alkaline Phosphatase 08/02/2024 175 (H)  39 - 117 U/L Final    Total Bilirubin 08/02/2024 2.4 (H)  0.0 - 1.2 mg/dL Final    Globulin 08/02/2024 3.7  gm/dL Final    A/G Ratio 08/02/2024 0.8  g/dL Final    BUN/Creatinine Ratio 08/02/2024 17.5  7.0 - 25.0 Final    Anion Gap 08/02/2024 13.8   5.0 - 15.0 mmol/L Final    eGFR 08/02/2024 91.5  >60.0 mL/min/1.73 Final    Lipase 08/02/2024 11 (L)  13 - 60 U/L Final    Color, UA 08/02/2024 Yellow  Yellow, Straw Final    Appearance, UA 08/02/2024 Clear  Clear Final    pH, UA 08/02/2024 7.0  5.0 - 8.0 Final    Specific Gravity, UA 08/02/2024 1.010  1.005 - 1.030 Final    Glucose, UA 08/02/2024 250 mg/dL (1+) (A)  Negative Final    Ketones, UA 08/02/2024 Negative  Negative Final    Bilirubin, UA 08/02/2024 Negative  Negative Final    Blood, UA 08/02/2024 Negative  Negative Final    Protein, UA 08/02/2024 Negative  Negative Final    Leuk Esterase, UA 08/02/2024 Negative  Negative Final    Nitrite, UA 08/02/2024 Negative  Negative Final    Urobilinogen, UA 08/02/2024 2.0 E.U./dL (A)  0.2 - 1.0 E.U./dL Final    proBNP 08/02/2024 284.3  0.0 - 1,800.0 pg/mL Final    C-Reactive Protein 08/02/2024 2.87 (H)  0.00 - 0.50 mg/dL Final    THC, Screen, Urine 08/02/2024 Negative  Negative Final    Phencyclidine (PCP), Urine 08/02/2024 Negative  Negative Final    Cocaine Screen, Urine 08/02/2024 Negative  Negative Final    Methamphetamine, Ur 08/02/2024 Negative  Negative Final    Opiate Screen 08/02/2024 Negative  Negative Final    Amphetamine Screen, Urine 08/02/2024 Negative  Negative Final    Benzodiazepine Screen, Urine 08/02/2024 Negative  Negative Final    Tricyclic Antidepressants Screen 08/02/2024 Positive (A)  Negative Final    Methadone Screen, Urine 08/02/2024 Negative  Negative Final    Barbiturates Screen, Urine 08/02/2024 Negative  Negative Final    Oxycodone Screen, Urine 08/02/2024 Negative  Negative Final    Buprenorphine, Screen, Urine 08/02/2024 Negative  Negative Final    Ammonia 08/02/2024 33  11 - 51 umol/L Final    WBC 08/02/2024 7.08  3.40 - 10.80 10*3/mm3 Final    RBC 08/02/2024 3.62 (L)  3.77 - 5.28 10*6/mm3 Final    Hemoglobin 08/02/2024 9.7 (L)  12.0 - 15.9 g/dL Final    Hematocrit 08/02/2024 32.1 (L)  34.0 - 46.6 % Final    MCV 08/02/2024 88.7   79.0 - 97.0 fL Final    MCH 08/02/2024 26.8  26.6 - 33.0 pg Final    MCHC 08/02/2024 30.2 (L)  31.5 - 35.7 g/dL Final    RDW 08/02/2024 15.0  12.3 - 15.4 % Final    RDW-SD 08/02/2024 48.6  37.0 - 54.0 fl Final    MPV 08/02/2024 10.8  6.0 - 12.0 fL Final    Platelets 08/02/2024 170  140 - 450 10*3/mm3 Final    Neutrophil % 08/02/2024 89.8 (H)  42.7 - 76.0 % Final    Lymphocyte % 08/02/2024 3.5 (L)  19.6 - 45.3 % Final    Monocyte % 08/02/2024 4.9 (L)  5.0 - 12.0 % Final    Eosinophil % 08/02/2024 0.0 (L)  0.3 - 6.2 % Final    Basophil % 08/02/2024 0.1  0.0 - 1.5 % Final    Immature Grans % 08/02/2024 1.7 (H)  0.0 - 0.5 % Final    Neutrophils, Absolute 08/02/2024 6.35  1.70 - 7.00 10*3/mm3 Final    Lymphocytes, Absolute 08/02/2024 0.25 (L)  0.70 - 3.10 10*3/mm3 Final    Monocytes, Absolute 08/02/2024 0.35  0.10 - 0.90 10*3/mm3 Final    Eosinophils, Absolute 08/02/2024 0.00  0.00 - 0.40 10*3/mm3 Final    Basophils, Absolute 08/02/2024 0.01  0.00 - 0.20 10*3/mm3 Final    Immature Grans, Absolute 08/02/2024 0.12 (H)  0.00 - 0.05 10*3/mm3 Final    nRBC 08/02/2024 0.0  0.0 - 0.2 /100 WBC Final    Extra Tube 08/02/2024 Hold for add-ons.   Final    Auto resulted.    Extra Tube 08/02/2024 hold for add-on   Final    Auto resulted    Extra Tube 08/02/2024 Hold for add-ons.   Final    Auto resulted.    Extra Tube 08/02/2024 Hold for add-ons.   Final    Auto resulted    HS Troponin T 08/02/2024 <6  <14 ng/L Final    Troponin T Delta 08/02/2024    Final    Unable to calculate.    Fentanyl, Urine 08/02/2024 Negative  Negative Final   Infusion on 08/01/2024   Component Date Value Ref Range Status    Glucose 08/01/2024 130 (H)  65 - 99 mg/dL Final    BUN 08/01/2024 8  8 - 23 mg/dL Final    Creatinine 08/01/2024 0.60  0.57 - 1.00 mg/dL Final    Sodium 08/01/2024 139  136 - 145 mmol/L Final    Potassium 08/01/2024 3.1 (L)  3.5 - 5.2 mmol/L Final    Chloride 08/01/2024 101  98 - 107 mmol/L Final    CO2 08/01/2024 28.8  22.0 - 29.0  mmol/L Final    Calcium 08/01/2024 8.7  8.6 - 10.5 mg/dL Final    Total Protein 08/01/2024 7.0  6.0 - 8.5 g/dL Final    Albumin 08/01/2024 3.1 (L)  3.5 - 5.2 g/dL Final    ALT (SGPT) 08/01/2024 16  1 - 33 U/L Final    AST (SGOT) 08/01/2024 30  1 - 32 U/L Final    Alkaline Phosphatase 08/01/2024 178 (H)  39 - 117 U/L Final    Total Bilirubin 08/01/2024 1.7 (H)  0.0 - 1.2 mg/dL Final    Globulin 08/01/2024 3.9  gm/dL Final    A/G Ratio 08/01/2024 0.8  g/dL Final    BUN/Creatinine Ratio 08/01/2024 13.3  7.0 - 25.0 Final    Anion Gap 08/01/2024 9.2  5.0 - 15.0 mmol/L Final    eGFR 08/01/2024 92.6  >60.0 mL/min/1.73 Final    Magnesium 08/01/2024 2.0  1.6 - 2.4 mg/dL Final    Glucose 08/01/2024 206 (H)  70 - 130 mg/dL Final   Office Visit on 07/31/2024   Component Date Value Ref Range Status    Magnesium 07/31/2024 1.9  1.6 - 2.4 mg/dL Final   Lab on 07/31/2024   Component Date Value Ref Range Status    Glucose 07/31/2024 110 (H)  65 - 99 mg/dL Final    BUN 07/31/2024 8  8 - 23 mg/dL Final    Creatinine 07/31/2024 0.62  0.57 - 1.00 mg/dL Final    Sodium 07/31/2024 136  136 - 145 mmol/L Final    Potassium 07/31/2024 2.4 (C)  3.5 - 5.2 mmol/L Final    Chloride 07/31/2024 97 (L)  98 - 107 mmol/L Final    CO2 07/31/2024 29.0  22.0 - 29.0 mmol/L Final    Calcium 07/31/2024 8.7  8.6 - 10.5 mg/dL Final    Total Protein 07/31/2024 7.1  6.0 - 8.5 g/dL Final    Albumin 07/31/2024 3.2 (L)  3.5 - 5.2 g/dL Final    ALT (SGPT) 07/31/2024 17  1 - 33 U/L Final    AST (SGOT) 07/31/2024 34 (H)  1 - 32 U/L Final    Alkaline Phosphatase 07/31/2024 189 (H)  39 - 117 U/L Final    Total Bilirubin 07/31/2024 1.8 (H)  0.0 - 1.2 mg/dL Final    Globulin 07/31/2024 3.9  gm/dL Final    A/G Ratio 07/31/2024 0.8  g/dL Final    BUN/Creatinine Ratio 07/31/2024 12.9  7.0 - 25.0 Final    Anion Gap 07/31/2024 10.0  5.0 - 15.0 mmol/L Final    eGFR 07/31/2024 91.9  >60.0 mL/min/1.73 Final    TSH 07/31/2024 3.170  0.270 - 4.200 uIU/mL Final    Free T4  07/31/2024 1.50  0.93 - 1.70 ng/dL Final    WBC 07/31/2024 7.37  3.40 - 10.80 10*3/mm3 Final    RBC 07/31/2024 3.93  3.77 - 5.28 10*6/mm3 Final    Hemoglobin 07/31/2024 10.8 (L)  12.0 - 15.9 g/dL Final    Hematocrit 07/31/2024 34.2  34.0 - 46.6 % Final    MCV 07/31/2024 87.0  79.0 - 97.0 fL Final    MCH 07/31/2024 27.5  26.6 - 33.0 pg Final    MCHC 07/31/2024 31.6  31.5 - 35.7 g/dL Final    RDW 07/31/2024 15.1  12.3 - 15.4 % Final    RDW-SD 07/31/2024 48.8  37.0 - 54.0 fl Final    MPV 07/31/2024 10.0  6.0 - 12.0 fL Final    Platelets 07/31/2024 209  140 - 450 10*3/mm3 Final    Neutrophil % 07/31/2024 73.0  42.7 - 76.0 % Final    Lymphocyte % 07/31/2024 13.3 (L)  19.6 - 45.3 % Final    Monocyte % 07/31/2024 10.0  5.0 - 12.0 % Final    Eosinophil % 07/31/2024 2.4  0.3 - 6.2 % Final    Basophil % 07/31/2024 0.9  0.0 - 1.5 % Final    Immature Grans % 07/31/2024 0.4  0.0 - 0.5 % Final    Neutrophils, Absolute 07/31/2024 5.37  1.70 - 7.00 10*3/mm3 Final    Lymphocytes, Absolute 07/31/2024 0.98  0.70 - 3.10 10*3/mm3 Final    Monocytes, Absolute 07/31/2024 0.74  0.10 - 0.90 10*3/mm3 Final    Eosinophils, Absolute 07/31/2024 0.18  0.00 - 0.40 10*3/mm3 Final    Basophils, Absolute 07/31/2024 0.07  0.00 - 0.20 10*3/mm3 Final    Immature Grans, Absolute 07/31/2024 0.03  0.00 - 0.05 10*3/mm3 Final    nRBC 07/31/2024 0.0  0.0 - 0.2 /100 WBC Final   Admission on 07/25/2024, Discharged on 07/25/2024   Component Date Value Ref Range Status    Glucose 07/25/2024 132 (H)  70 - 130 mg/dL Final   Lab on 07/22/2024   Component Date Value Ref Range Status    Glucose 07/22/2024 129 (H)  65 - 99 mg/dL Final    BUN 07/22/2024 11  8 - 23 mg/dL Final    Creatinine 07/22/2024 0.63  0.57 - 1.00 mg/dL Final    Sodium 07/22/2024 136  136 - 145 mmol/L Final    Potassium 07/22/2024 3.3 (L)  3.5 - 5.2 mmol/L Final    Chloride 07/22/2024 98  98 - 107 mmol/L Final    CO2 07/22/2024 26.1  22.0 - 29.0 mmol/L Final    Calcium 07/22/2024 8.9  8.6 - 10.5  mg/dL Final    Total Protein 07/22/2024 7.3  6.0 - 8.5 g/dL Final    Albumin 07/22/2024 3.3 (L)  3.5 - 5.2 g/dL Final    ALT (SGPT) 07/22/2024 14  1 - 33 U/L Final    AST (SGOT) 07/22/2024 32  1 - 32 U/L Final    Alkaline Phosphatase 07/22/2024 201 (H)  39 - 117 U/L Final    Total Bilirubin 07/22/2024 1.6 (H)  0.0 - 1.2 mg/dL Final    Globulin 07/22/2024 4.0  gm/dL Final    A/G Ratio 07/22/2024 0.8  g/dL Final    BUN/Creatinine Ratio 07/22/2024 17.5  7.0 - 25.0 Final    Anion Gap 07/22/2024 11.9  5.0 - 15.0 mmol/L Final    eGFR 07/22/2024 91.5  >60.0 mL/min/1.73 Final    WBC 07/22/2024 6.38  3.40 - 10.80 10*3/mm3 Final    RBC 07/22/2024 3.71 (L)  3.77 - 5.28 10*6/mm3 Final    Hemoglobin 07/22/2024 10.2 (L)  12.0 - 15.9 g/dL Final    Hematocrit 07/22/2024 33.3 (L)  34.0 - 46.6 % Final    MCV 07/22/2024 89.8  79.0 - 97.0 fL Final    MCH 07/22/2024 27.5  26.6 - 33.0 pg Final    MCHC 07/22/2024 30.6 (L)  31.5 - 35.7 g/dL Final    RDW 07/22/2024 15.4  12.3 - 15.4 % Final    RDW-SD 07/22/2024 50.4  37.0 - 54.0 fl Final    MPV 07/22/2024 10.1  6.0 - 12.0 fL Final    Platelets 07/22/2024 187  140 - 450 10*3/mm3 Final    Neutrophil % 07/22/2024 73.7  42.7 - 76.0 % Final    Lymphocyte % 07/22/2024 12.2 (L)  19.6 - 45.3 % Final    Monocyte % 07/22/2024 11.0  5.0 - 12.0 % Final    Eosinophil % 07/22/2024 2.2  0.3 - 6.2 % Final    Basophil % 07/22/2024 0.6  0.0 - 1.5 % Final    Immature Grans % 07/22/2024 0.3  0.0 - 0.5 % Final    Neutrophils, Absolute 07/22/2024 4.70  1.70 - 7.00 10*3/mm3 Final    Lymphocytes, Absolute 07/22/2024 0.78  0.70 - 3.10 10*3/mm3 Final    Monocytes, Absolute 07/22/2024 0.70  0.10 - 0.90 10*3/mm3 Final    Eosinophils, Absolute 07/22/2024 0.14  0.00 - 0.40 10*3/mm3 Final    Basophils, Absolute 07/22/2024 0.04  0.00 - 0.20 10*3/mm3 Final    Immature Grans, Absolute 07/22/2024 0.02  0.00 - 0.05 10*3/mm3 Final    nRBC 07/22/2024 0.0  0.0 - 0.2 /100 WBC Final    Protime 07/22/2024 15.3 (H)  12.1 - 14.7  Seconds Final    INR 07/22/2024 1.19 (H)  0.90 - 1.10 Final        Condition on Discharge:  improved    Vital Signs  Temp:  [98.3 °F (36.8 °C)-99.8 °F (37.7 °C)] 98.3 °F (36.8 °C)  Heart Rate:  [105-135] 105  Resp:  [20-24] 20  BP: ()/(48-60) 115/60      Discharge Disposition:  Home or Self Care    Discharge Medications:     Discharge Medications        New Medications        Instructions Start Date   busPIRone 5 MG tablet  Commonly known as: BUSPAR   5 mg, Oral, Every 12 Hours Scheduled             Continue These Medications        Instructions Start Date   albuterol (2.5 MG/3ML) 0.083% nebulizer solution  Commonly known as: PROVENTIL   2.5 mg, Nebulization, Every 4 Hours PRN      albuterol sulfate  (90 Base) MCG/ACT inhaler  Commonly known as: PROVENTIL HFA;VENTOLIN HFA;PROAIR HFA   2 puffs, Inhalation, Every 4 Hours PRN      Breztri Aerosphere 160-9-4.8 MCG/ACT aerosol inhaler  Generic drug: Budeson-Glycopyrrol-Formoterol   2 puffs, Inhalation, 2 Times Daily      doxepin 50 MG capsule  Commonly known as: SINEquan   50 mg, Oral, Nightly      lactulose 10 GM/15ML solution  Commonly known as: CHRONULAC   20 g, Oral, 3 Times Daily      levocetirizine 5 MG tablet  Commonly known as: XYZAL   5 mg, Oral, Nightly      levothyroxine 75 MCG tablet  Commonly known as: SYNTHROID, LEVOTHROID   75 mcg, Oral, Daily      lidocaine-prilocaine 2.5-2.5 % cream  Commonly known as: EMLA   Apply to port-a-cath site 30 minutes prior to arrival at infusion center. Cover with plastic wrap.      montelukast 10 MG tablet  Commonly known as: SINGULAIR   10 mg, Oral, Nightly      OLANZapine 5 MG tablet  Commonly known as: zyPREXA   5 mg, Oral, Nightly, Take nightly x 4 starting night of chemotherapy.      ondansetron 8 MG tablet  Commonly known as: ZOFRAN   8 mg, Oral, 3 Times Daily PRN      pantoprazole 40 MG EC tablet  Commonly known as: PROTONIX   40 mg, Oral, Daily      traMADol 50 MG tablet  Commonly known as: ULTRAM   50  mg, Oral, Daily PRN      Tresiba FlexTouch 100 UNIT/ML solution pen-injector injection  Generic drug: insulin degludec   8 Units, Subcutaneous, Nightly             Stop These Medications      famotidine 40 MG tablet  Commonly known as: PEPCID     furosemide 20 MG tablet  Commonly known as: LASIX     mupirocin 2 % ointment  Commonly known as: BACTROBAN     potassium chloride 20 MEQ CR tablet  Commonly known as: KLOR-CON M20     prochlorperazine 10 MG tablet  Commonly known as: COMPAZINE     spironolactone 25 MG tablet  Commonly known as: ALDACTONE              Discharge Diet: consistent carbohydrate     Activity at Discharge: As tolerated     Follow-up Appointments  Future Appointments   Date Time Provider Department Center   8/8/2024  3:30 PM COR MRI 1  [facility]   8/22/2024  8:00 AM [facility]Y NURSE LAB [facility]   8/22/2024  8:30 AM Rosenda Cassidy MD [facility]   8/22/2024  9:00 AM [facility] OP INFUS CHAIR 27  [facility] INF [facility]   9/12/2024  7:45 AM [facility] OP INFUS CHAIR 27  [facility]   9/12/2024  7:45 AM LAB DRAW COR OP INFUSION  [facility]       Time: I spent  > 30  minutes on this discharge activity which included: face-to-face encounter with the patient, reviewing the data in the system, coordination of the care with the nursing staff as well as consultants, documentation, and entering orders.        Clinician:   Harley Burns MD  08/06/24  14:58 EDT    Electronically signed by Harley Burns MD at 08/06/24 [id number]

## 2024-08-13 ENCOUNTER — TELEPHONE (OUTPATIENT)
Dept: ONCOLOGY | Facility: CLINIC | Age: 79
End: 2024-08-13
Payer: MEDICARE

## 2024-08-13 NOTE — TELEPHONE ENCOUNTER
RN called patient to discuss. She voiced that she already has so many issues to begin with and doesn't think her body can withstand any cancer-related treatment. She wishes to discontinue all treatment and cancel all future appointments.     When RN asked the patient about her symptoms, she voiced experiencing nausea, vomiting, weakness, etc. When RN offered patient to come in for an acute visit, she politely declined and said she would like to just stay at home and treat her symptoms as she sees fit. She confirmed having enough of her nausea medication as of right now. RN voiced understanding and encouraged her to please call us if she were to change her mind about treatment, if she would like to see Dr. Cassidy, or if she were to decide she would like to pursue hospice care to help her stay comfortable. Patient voiced understanding. Dr. Cassidy made aware.

## 2024-08-13 NOTE — TELEPHONE ENCOUNTER
Pt called [facility] and advised them she wishes to cancel all future appointments and treatment that she does not like the way they make her feel and does not want to continue with visits.

## 2024-08-14 ENCOUNTER — DOCUMENTATION (OUTPATIENT)
Dept: ONCOLOGY | Facility: CLINIC | Age: 79
End: 2024-08-14
Payer: MEDICARE

## 2024-08-21 ENCOUNTER — DOCUMENTATION (OUTPATIENT)
Dept: ONCOLOGY | Facility: CLINIC | Age: 79
End: 2024-08-21
Payer: MEDICARE

## 2024-08-21 NOTE — PROGRESS NOTES
SS contacted patient this date to follow up.    Patient verbalizes that she contacted clinic and cancelled all future appointments.    Patient offered SS contact information.     No further needs at this time.

## 2024-08-21 NOTE — PROGRESS NOTES
SS attempted to contact patient without success.     SS will continue to attempt contact.    SS will follow.

## 2024-09-13 ENCOUNTER — TRANSCRIBE ORDERS (OUTPATIENT)
Dept: ADMINISTRATIVE | Facility: HOSPITAL | Age: 79
End: 2024-09-13
Payer: MEDICARE

## 2024-09-13 DIAGNOSIS — C34.90 SQUAMOUS CELL CARCINOMA OF LUNG, STAGE I, UNSPECIFIED LATERALITY: Primary | ICD-10-CM

## 2024-09-16 ENCOUNTER — HOSPITAL ENCOUNTER (OUTPATIENT)
Dept: CT IMAGING | Facility: HOSPITAL | Age: 79
Discharge: HOME | End: 2024-09-16
Payer: MEDICARE

## 2024-09-16 DIAGNOSIS — C34.90 SQUAMOUS CELL CARCINOMA OF LUNG, STAGE I, UNSPECIFIED LATERALITY: ICD-10-CM

## 2024-09-16 LAB — CREAT BLDA-MCNC: 0.5 MG/DL (ref 0.6–1.3)

## 2024-09-16 PROCEDURE — 74177 CT ABD & PELVIS W/CONTRAST: CPT

## 2024-09-16 PROCEDURE — 25510000001 IOPAMIDOL 61 % SOLUTION

## 2024-09-16 PROCEDURE — 82565 ASSAY OF CREATININE: CPT

## 2024-09-16 PROCEDURE — 71260 CT THORAX DX C+: CPT

## 2024-09-16 RX ORDER — IOPAMIDOL 612 MG/ML
100 INJECTION, SOLUTION INTRAVASCULAR
Status: COMPLETED | OUTPATIENT
Start: 2024-09-16 | End: 2024-09-16

## 2024-09-16 RX ADMIN — IOPAMIDOL 70 ML: 612 INJECTION, SOLUTION INTRAVENOUS at 13:27

## 2024-10-19 ENCOUNTER — TELEPHONE (OUTPATIENT)
Dept: FAMILY MEDICINE CLINIC | Age: 79
End: 2024-10-19
Payer: MEDICARE

## 2024-10-19 DIAGNOSIS — C34.90 NON-SMALL CELL LUNG CANCER, UNSPECIFIED LATERALITY: Primary | ICD-10-CM

## 2024-10-19 RX ORDER — LORAZEPAM 2 MG/ML
1 CONCENTRATE ORAL EVERY 4 HOURS PRN
Qty: 30 ML | Refills: 0 | Status: SHIPPED | OUTPATIENT
Start: 2024-10-19

## 2024-10-19 RX ORDER — MORPHINE SULFATE 100 MG/5ML
5 SOLUTION ORAL EVERY 4 HOURS PRN
Qty: 30 ML | Refills: 0 | Status: SHIPPED | OUTPATIENT
Start: 2024-10-19

## 2024-10-19 NOTE — TELEPHONE ENCOUNTER
Admit hospice   Non small cell lung cancer  Took only one treatment and decided to forego further therapy

## 2024-10-23 DIAGNOSIS — C34.90 NON-SMALL CELL LUNG CANCER, UNSPECIFIED LATERALITY: Primary | ICD-10-CM

## 2024-10-23 RX ORDER — CODEINE PHOSPHATE/GUAIFENESIN 10-100MG/5
5 LIQUID (ML) ORAL 4 TIMES DAILY PRN
Qty: 280 ML | Refills: 0 | Status: SHIPPED | OUTPATIENT
Start: 2024-10-23

## 2024-11-06 ENCOUNTER — APPOINTMENT (OUTPATIENT)
Dept: CT IMAGING | Facility: HOSPITAL | Age: 79
End: 2024-11-06
Payer: MEDICARE

## 2024-11-06 ENCOUNTER — HOSPITAL ENCOUNTER (EMERGENCY)
Facility: HOSPITAL | Age: 79
Discharge: NURSING, REHAB OR LONG-TERM CARE | End: 2024-11-06
Attending: EMERGENCY MEDICINE | Admitting: STUDENT IN AN ORGANIZED HEALTH CARE EDUCATION/TRAINING PROGRAM
Payer: MEDICARE

## 2024-11-06 VITALS
RESPIRATION RATE: 24 BRPM | HEART RATE: 138 BPM | HEIGHT: 66 IN | SYSTOLIC BLOOD PRESSURE: 140 MMHG | DIASTOLIC BLOOD PRESSURE: 76 MMHG | OXYGEN SATURATION: 95 % | BODY MASS INDEX: 20.03 KG/M2 | WEIGHT: 124.6 LBS | TEMPERATURE: 98.4 F

## 2024-11-06 DIAGNOSIS — K76.82 HEPATIC ENCEPHALOPATHY: ICD-10-CM

## 2024-11-06 DIAGNOSIS — R41.82 ALTERED MENTAL STATUS, UNSPECIFIED ALTERED MENTAL STATUS TYPE: ICD-10-CM

## 2024-11-06 DIAGNOSIS — Z51.5 HOSPICE CARE PATIENT: Primary | ICD-10-CM

## 2024-11-06 LAB
ALBUMIN SERPL-MCNC: 2.8 G/DL (ref 3.5–5.2)
ALBUMIN/GLOB SERPL: 0.7 G/DL
ALP SERPL-CCNC: 223 U/L (ref 39–117)
ALT SERPL W P-5'-P-CCNC: 18 U/L (ref 1–33)
AMMONIA BLD-SCNC: 87 UMOL/L (ref 11–51)
AMPHET+METHAMPHET UR QL: NEGATIVE
AMPHETAMINES UR QL: NEGATIVE
ANION GAP SERPL CALCULATED.3IONS-SCNC: 12.7 MMOL/L (ref 5–15)
AST SERPL-CCNC: 37 U/L (ref 1–32)
BACTERIA UR QL AUTO: ABNORMAL /HPF
BARBITURATES UR QL SCN: NEGATIVE
BASOPHILS # BLD AUTO: 0.05 10*3/MM3 (ref 0–0.2)
BASOPHILS NFR BLD AUTO: 0.7 % (ref 0–1.5)
BENZODIAZ UR QL SCN: NEGATIVE
BILIRUB SERPL-MCNC: 3.6 MG/DL (ref 0–1.2)
BILIRUB UR QL STRIP: NEGATIVE
BUN SERPL-MCNC: 9 MG/DL (ref 8–23)
BUN/CREAT SERPL: 14.8 (ref 7–25)
BUPRENORPHINE SERPL-MCNC: NEGATIVE NG/ML
CALCIUM SPEC-SCNC: 9 MG/DL (ref 8.6–10.5)
CANNABINOIDS SERPL QL: NEGATIVE
CHLORIDE SERPL-SCNC: 104 MMOL/L (ref 98–107)
CLARITY UR: ABNORMAL
CO2 SERPL-SCNC: 21.3 MMOL/L (ref 22–29)
COCAINE UR QL: NEGATIVE
COLOR UR: YELLOW
CREAT SERPL-MCNC: 0.61 MG/DL (ref 0.57–1)
DEPRECATED RDW RBC AUTO: 55.1 FL (ref 37–54)
EGFRCR SERPLBLD CKD-EPI 2021: 92.2 ML/MIN/1.73
EOSINOPHIL # BLD AUTO: 0.12 10*3/MM3 (ref 0–0.4)
EOSINOPHIL NFR BLD AUTO: 1.7 % (ref 0.3–6.2)
ERYTHROCYTE [DISTWIDTH] IN BLOOD BY AUTOMATED COUNT: 15.8 % (ref 12.3–15.4)
ETHANOL BLD-MCNC: <10 MG/DL (ref 0–10)
ETHANOL UR QL: <0.01 %
FENTANYL UR-MCNC: NEGATIVE NG/ML
GLOBULIN UR ELPH-MCNC: 4 GM/DL
GLUCOSE SERPL-MCNC: 129 MG/DL (ref 65–99)
GLUCOSE UR STRIP-MCNC: NEGATIVE MG/DL
HCT VFR BLD AUTO: 32 % (ref 34–46.6)
HGB BLD-MCNC: 9.8 G/DL (ref 12–15.9)
HGB UR QL STRIP.AUTO: NEGATIVE
HYALINE CASTS UR QL AUTO: ABNORMAL /LPF
IMM GRANULOCYTES # BLD AUTO: 0.03 10*3/MM3 (ref 0–0.05)
IMM GRANULOCYTES NFR BLD AUTO: 0.4 % (ref 0–0.5)
KETONES UR QL STRIP: NEGATIVE
LEUKOCYTE ESTERASE UR QL STRIP.AUTO: ABNORMAL
LYMPHOCYTES # BLD AUTO: 0.46 10*3/MM3 (ref 0.7–3.1)
LYMPHOCYTES NFR BLD AUTO: 6.5 % (ref 19.6–45.3)
MCH RBC QN AUTO: 29.1 PG (ref 26.6–33)
MCHC RBC AUTO-ENTMCNC: 30.6 G/DL (ref 31.5–35.7)
MCV RBC AUTO: 95 FL (ref 79–97)
METHADONE UR QL SCN: NEGATIVE
MONOCYTES # BLD AUTO: 0.81 10*3/MM3 (ref 0.1–0.9)
MONOCYTES NFR BLD AUTO: 11.4 % (ref 5–12)
NEUTROPHILS NFR BLD AUTO: 5.66 10*3/MM3 (ref 1.7–7)
NEUTROPHILS NFR BLD AUTO: 79.3 % (ref 42.7–76)
NITRITE UR QL STRIP: NEGATIVE
NRBC BLD AUTO-RTO: 0 /100 WBC (ref 0–0.2)
OPIATES UR QL: POSITIVE
OXYCODONE UR QL SCN: NEGATIVE
PCP UR QL SCN: NEGATIVE
PH UR STRIP.AUTO: 6.5 [PH] (ref 5–8)
PLATELET # BLD AUTO: 161 10*3/MM3 (ref 140–450)
PMV BLD AUTO: 10.1 FL (ref 6–12)
POTASSIUM SERPL-SCNC: 3.4 MMOL/L (ref 3.5–5.2)
PROT SERPL-MCNC: 6.8 G/DL (ref 6–8.5)
PROT UR QL STRIP: NEGATIVE
RBC # BLD AUTO: 3.37 10*6/MM3 (ref 3.77–5.28)
RBC # UR STRIP: ABNORMAL /HPF
REF LAB TEST METHOD: ABNORMAL
SODIUM SERPL-SCNC: 138 MMOL/L (ref 136–145)
SP GR UR STRIP: 1.01 (ref 1–1.03)
SQUAMOUS #/AREA URNS HPF: ABNORMAL /HPF
TRICYCLICS UR QL SCN: NEGATIVE
UROBILINOGEN UR QL STRIP: ABNORMAL
WBC # UR STRIP: ABNORMAL /HPF
WBC NRBC COR # BLD AUTO: 7.13 10*3/MM3 (ref 3.4–10.8)

## 2024-11-06 PROCEDURE — 82077 ASSAY SPEC XCP UR&BREATH IA: CPT | Performed by: PHYSICIAN ASSISTANT

## 2024-11-06 PROCEDURE — 36415 COLL VENOUS BLD VENIPUNCTURE: CPT

## 2024-11-06 PROCEDURE — 70450 CT HEAD/BRAIN W/O DYE: CPT

## 2024-11-06 PROCEDURE — 70450 CT HEAD/BRAIN W/O DYE: CPT | Performed by: RADIOLOGY

## 2024-11-06 PROCEDURE — 81001 URINALYSIS AUTO W/SCOPE: CPT | Performed by: PHYSICIAN ASSISTANT

## 2024-11-06 PROCEDURE — 85025 COMPLETE CBC W/AUTO DIFF WBC: CPT | Performed by: PHYSICIAN ASSISTANT

## 2024-11-06 PROCEDURE — 82140 ASSAY OF AMMONIA: CPT | Performed by: PHYSICIAN ASSISTANT

## 2024-11-06 PROCEDURE — 80307 DRUG TEST PRSMV CHEM ANLYZR: CPT | Performed by: PHYSICIAN ASSISTANT

## 2024-11-06 PROCEDURE — 93005 ELECTROCARDIOGRAM TRACING: CPT | Performed by: STUDENT IN AN ORGANIZED HEALTH CARE EDUCATION/TRAINING PROGRAM

## 2024-11-06 PROCEDURE — 87086 URINE CULTURE/COLONY COUNT: CPT | Performed by: PHYSICIAN ASSISTANT

## 2024-11-06 PROCEDURE — 80053 COMPREHEN METABOLIC PANEL: CPT | Performed by: PHYSICIAN ASSISTANT

## 2024-11-06 PROCEDURE — 93010 ELECTROCARDIOGRAM REPORT: CPT | Performed by: INTERNAL MEDICINE

## 2024-11-06 PROCEDURE — 99284 EMERGENCY DEPT VISIT MOD MDM: CPT

## 2024-11-06 RX ORDER — LACTULOSE 10 G/15ML
30 SOLUTION ORAL ONCE
Status: COMPLETED | OUTPATIENT
Start: 2024-11-06 | End: 2024-11-06

## 2024-11-06 RX ORDER — SODIUM CHLORIDE 0.9 % (FLUSH) 0.9 %
10 SYRINGE (ML) INJECTION AS NEEDED
Status: DISCONTINUED | OUTPATIENT
Start: 2024-11-06 | End: 2024-11-06 | Stop reason: HOSPADM

## 2024-11-06 RX ADMIN — LACTULOSE 30 G: 20 SOLUTION ORAL at 07:56

## 2024-11-06 NOTE — NURSING NOTE
Eugenia from [facility] ([phone]) called and said pt had suddenly become very paranoid overnight, that she had busted out her room window with her cane, and the window in the exit door as well. Pt c/o her roommate trying to kill her. Eugenia said that pt had come from home where she had been on hospice and that there is concern that her niece had been taking her medications. Pt also had been self-administering Ativan drops and wasn't sure how much she had been giving herself. Pt has been at [facility] since approximately 10/31. Eugenia asks for staff to call with any questions regarding pt status.

## 2024-11-06 NOTE — NURSING NOTE
Intake assessment completed. Pt came from [facility] due to breaking a window with her cane and hitting the tv with her cane also. Pt was medically cleared and found to have a high ammonia level which was treated with lactulose. Pt is AAOx3. She knows she is in [facility]. She knew the month and year, she also knew yesterday was election day. She denies SI HI AVH. She has a hx of lung cancer and is on hospice per the nursing home and a DNR. She states that this morning she felt dizzy and lightheaded and needed attention that is why she hit the tv with her cane. She states she does not like being at the nursing home and wants to go home with her son, Andrés. She rates anxiety 8/10 and depression 0/10. She reports not eating or sleeping well.

## 2024-11-06 NOTE — ED PROVIDER NOTES
Subjective   History of Present Illness  78-year-old female presents to the ER chief complaint of altered mental status.  Patient is a resident at [facility].  Patient apparently started to beat the TV as well as a window in her nursing home room with her cane.  Hallucinations of somebody attacking her.  Patient does have known cancer.  Patient is a DNR.        Review of Systems   Constitutional: Negative.  Negative for fever.   HENT: Negative.     Respiratory: Negative.     Cardiovascular: Negative.  Negative for chest pain.   Gastrointestinal: Negative.  Negative for abdominal pain.   Endocrine: Negative.    Genitourinary: Negative.  Negative for dysuria.   Skin: Negative.    Neurological: Negative.    Psychiatric/Behavioral:  Positive for agitation, behavioral problems, confusion and hallucinations.    All other systems reviewed and are negative.      Past Medical History:   Diagnosis Date    Anemia     Arthritis     Asthma     Cancer     squamous cell carinoma lung Left    CHF (congestive heart failure)     Cirrhosis     COPD (chronic obstructive pulmonary disease)     Diabetes mellitus     Disease of thyroid gland     Diverticulitis     Encounter for blood transfusion     Fibromyalgia     Headache     Low back pain     Osteoporosis        Allergies   Allergen Reactions    Ciprofloxacin Nausea Only, Rash and Other (See Comments)     Unknown-flu symptoms    Sertraline Other (See Comments)       Past Surgical History:   Procedure Laterality Date    CHOLECYSTECTOMY  2012    HYSTERECTOMY  2003    PORTACATH PLACEMENT N/A 7/25/2024    Procedure: INSERTION OF PORTACATH;  Surgeon: Austen Mendez MD;  Location: [facility];  Service: General;  Laterality: N/A;    SINUS SURGERY  2006       Family History   Problem Relation Age of Onset    COPD Mother     Hypertension Father     Heart disease Father        Social History     Socioeconomic History    Marital status:    Tobacco Use    Smoking status:  Former     Current packs/day: 0.25     Average packs/day: 0.3 packs/day for 10.8 years (2.7 ttl pk-yrs)     Types: Cigarettes     Start date: 2014     Passive exposure: Past    Smokeless tobacco: Never   Vaping Use    Vaping status: Never Used   Substance and Sexual Activity    Alcohol use: No    Drug use: No    Sexual activity: Defer           Objective   Physical Exam  Vitals and nursing note reviewed.   Constitutional:       General: She is not in acute distress.     Appearance: She is well-developed. She is not diaphoretic.   HENT:      Head: Normocephalic and atraumatic.      Right Ear: External ear normal.      Left Ear: External ear normal.      Nose: Nose normal.   Eyes:      Conjunctiva/sclera: Conjunctivae normal.      Pupils: Pupils are equal, round, and reactive to light.   Neck:      Vascular: No JVD.      Trachea: No tracheal deviation.   Cardiovascular:      Rate and Rhythm: Normal rate and regular rhythm.      Heart sounds: Normal heart sounds. No murmur heard.  Pulmonary:      Effort: Pulmonary effort is normal. No respiratory distress.      Breath sounds: No wheezing.   Abdominal:      Palpations: Abdomen is soft.      Tenderness: There is no abdominal tenderness.   Musculoskeletal:         General: No deformity. Normal range of motion.      Cervical back: Normal range of motion and neck supple.   Skin:     General: Skin is warm and dry.      Coloration: Skin is not pale.      Findings: No erythema or rash.   Neurological:      Mental Status: She is oriented to person, place, and time. She is disoriented.      GCS: GCS eye subscore is 4. GCS verbal subscore is 5. GCS motor subscore is 6.      Cranial Nerves: No cranial nerve deficit.   Psychiatric:         Mood and Affect: Mood is anxious.         Behavior: Behavior normal. Behavior is agitated.         Thought Content: Thought content normal.         Procedures           ED Course  ED Course as of 11/06/24 1040   Wed Nov 06, 2024   0640 EKG at 0  67 shows an undetermined rhythm, ventricular rate 142.  QRS duration 68, QTc 587 ms.  Nonspecific ST changes.  No evidence for STEMI.  Electronically signed by Rikki Saxena MD, 11/06/24, 6:40 AM EST.   [CM]   0793 Endorsed to Dr. Shepherd [RB]      ED Course User Index  [CM] Rikki Saxena MD  [RB] Larry Watkins II, PA                                               Medical Decision Making  --labs unremarkable  --CTH neg  --po lactulose  --no issues noted in ER stay, cannot rule out brain mets, majority of the workup been ordered outpatient but patient is now in hospice care, nursing facility requested psych evaluation.  Psych evaluated and recommended to discharge back to facility.   --Will defer to outpatient, d/c back to NH    Problems Addressed:  Altered mental status, unspecified altered mental status type: complicated acute illness or injury  Hepatic encephalopathy: complicated acute illness or injury  Hospice care patient: complicated acute illness or injury    Amount and/or Complexity of Data Reviewed  Labs: ordered.  Radiology: ordered.    Risk  Prescription drug management.        Final diagnoses:   Altered mental status, unspecified altered mental status type   Hospice care patient   Hepatic encephalopathy       ED Disposition  ED Disposition       ED Disposition   Discharge    Condition   Stable    Comment   --               Vernon Kaur PA-C  [address] [place] [address]  [phone]    In 1 week           Medication List      No changes were made to your prescriptions during this visit.            Norberto Shepherd,   11/06/24 4852       Norberto Shepherd,   11/06/24 1041

## 2024-11-06 NOTE — NURSING NOTE
Pt given D/C instructions and verbalized understanding. Given oupt resource info. Pt left with all belongings.pt transported via ambulance back to [facility].

## 2024-11-06 NOTE — ED NOTES
ECG performed @ 0627 and handed to Dr. Saxena @ 0630; ECG showed a critical result. Provider made aware.

## 2024-11-06 NOTE — NURSING NOTE
Pt presented to Dr. Ayala. He feels that pt's earlier behavior was probably due to medical reasons and pt will not benefit from a psychiatric admission at this time. Pt may be discharged back to the nursing home. RBTOx2 Discussed with ER provider.

## 2024-11-06 NOTE — NURSING NOTE
Report given to STEVE Méndez at [facility]. [facility] Larry called to arrange transport back there.

## 2024-11-06 NOTE — ED NOTES
Report called to [facility]. Per nursing home states that Pt has been accepted [facility] day stay due to Pt hitting a window with her cane and her tv. Notified Larry Haynes, [facility], ambulance discontinued. Contacted psych department for clarification. Awaiting plan of care.

## 2024-11-06 NOTE — ED NOTES
"Report received from STEVE Gusman. Pt resting on stretcher with eyes closed appearing to be asleep. Pt RR even and unlabored, skin WPD. Pt noted to be sinus tach on the monitor with . Pt easily arouses to verbal stimuli. Attempted to assess Pt orientation, Pt only stating \"they are going to kill me aren't they,\" attempted to re-orient Pt, assured Pt safety. Safety measures remain WDL. WCTM.  "

## 2024-11-07 LAB
BACTERIA SPEC AEROBE CULT: NORMAL
QT INTERVAL: 382 MS

## 2024-11-10 ENCOUNTER — HOSPITAL ENCOUNTER (EMERGENCY)
Facility: HOSPITAL | Age: 79
Discharge: NURSING, REHAB OR LONG-TERM CARE | End: 2024-11-10
Attending: EMERGENCY MEDICINE | Admitting: EMERGENCY MEDICINE
Payer: MEDICARE

## 2024-11-10 ENCOUNTER — APPOINTMENT (OUTPATIENT)
Dept: CT IMAGING | Facility: HOSPITAL | Age: 79
End: 2024-11-10
Payer: MEDICARE

## 2024-11-10 VITALS
SYSTOLIC BLOOD PRESSURE: 99 MMHG | BODY MASS INDEX: 20.09 KG/M2 | DIASTOLIC BLOOD PRESSURE: 50 MMHG | WEIGHT: 125 LBS | HEIGHT: 66 IN | OXYGEN SATURATION: 100 % | RESPIRATION RATE: 20 BRPM | HEART RATE: 107 BPM | TEMPERATURE: 98 F
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
## 2024-11-10 PROCEDURE — 72192 CT PELVIS W/O DYE: CPT

## 2024-11-10 PROCEDURE — 99284 EMERGENCY DEPT VISIT MOD MDM: CPT

## 2024-11-10 PROCEDURE — 70450 CT HEAD/BRAIN W/O DYE: CPT | Performed by: RADIOLOGY

## 2024-11-10 PROCEDURE — 72192 CT PELVIS W/O DYE: CPT | Performed by: RADIOLOGY

## 2024-11-10 PROCEDURE — 70450 CT HEAD/BRAIN W/O DYE: CPT

## 2024-11-10 NOTE — ED PROVIDER NOTES
Subjective   History of Present Illness  Patient is a 78-year-old female who presents after an unwitnessed fall.  penitentiary reports that patient was initially complaining of tailbone pain.  However upon patient's arrival she has no complaints.  Patient reports she was attempting to walk to the bathroom when she lost her balance and fell.  Patient denies any chest pain/pressure/tightness or any shortness of breath leading up to her fall as well as any dizziness/lightheadedness.  Patient presents with no complaints.  Patient presents [place] [facility]        Review of Systems   Constitutional: Negative.  Negative for fever.   HENT: Negative.     Respiratory: Negative.     Cardiovascular: Negative.  Negative for chest pain.   Gastrointestinal: Negative.  Negative for abdominal pain.   Endocrine: Negative.    Genitourinary: Negative.  Negative for dysuria.   Skin: Negative.    Neurological: Negative.    Psychiatric/Behavioral: Negative.     All other systems reviewed and are negative.      Past Medical History:   Diagnosis Date    Anemia     Arthritis     Asthma     Cancer     squamous cell carinoma lung Left    CHF (congestive heart failure)     Cirrhosis     COPD (chronic obstructive pulmonary disease)     Diabetes mellitus     Disease of thyroid gland     Diverticulitis     Encounter for blood transfusion     Fibromyalgia     Headache     Low back pain     Osteoporosis        Allergies   Allergen Reactions    Ciprofloxacin Nausea Only, Rash and Other (See Comments)     Unknown-flu symptoms    Sertraline Other (See Comments)       Past Surgical History:   Procedure Laterality Date    CHOLECYSTECTOMY  2012    HYSTERECTOMY  2003    PORTACATH PLACEMENT N/A 7/25/2024    Procedure: INSERTION OF PORTACATH;  Surgeon: Austen Mendez MD;  Location: [facility];  Service: General;  Laterality: N/A;    SINUS SURGERY  2006       Family History   Problem Relation Age of Onset    COPD Mother     Hypertension  Father     Heart disease Father        Social History     Socioeconomic History    Marital status:    Tobacco Use    Smoking status: Former     Current packs/day: 0.25     Average packs/day: 0.3 packs/day for 10.9 years (2.7 ttl pk-yrs)     Types: Cigarettes     Start date: 2014     Passive exposure: Past    Smokeless tobacco: Never   Vaping Use    Vaping status: Never Used   Substance and Sexual Activity    Alcohol use: No    Drug use: No    Sexual activity: Defer           Objective   Physical Exam  Vitals and nursing note reviewed.   Constitutional:       General: She is not in acute distress.     Appearance: She is well-developed. She is not diaphoretic.   HENT:      Head: Normocephalic and atraumatic.      Right Ear: External ear normal.      Left Ear: External ear normal.      Nose: Nose normal.   Eyes:      Conjunctiva/sclera: Conjunctivae normal.      Pupils: Pupils are equal, round, and reactive to light.   Neck:      Vascular: No JVD.      Trachea: No tracheal deviation.   Cardiovascular:      Rate and Rhythm: Normal rate and regular rhythm.      Heart sounds: Normal heart sounds. No murmur heard.  Pulmonary:      Effort: Pulmonary effort is normal. No respiratory distress.      Breath sounds: Normal breath sounds. No wheezing.   Abdominal:      General: Bowel sounds are normal.      Palpations: Abdomen is soft.      Tenderness: There is no abdominal tenderness.   Musculoskeletal:         General: No deformity. Normal range of motion.      Cervical back: Normal range of motion and neck supple.   Skin:     General: Skin is warm and dry.      Coloration: Skin is not pale.      Findings: No erythema or rash.   Neurological:      Mental Status: She is alert and oriented to person, place, and time.      Cranial Nerves: No cranial nerve deficit.   Psychiatric:         Behavior: Behavior normal.         Thought Content: Thought content normal.     CT Pelvis Without Contrast    Result Date: 11/10/2024   1.   No acute fracture. 2.  No acute dislocation. 3.  Moderate to large volume of free fluid in the peritoneal cavity consistent with ascites. 4.  Mild chronic grade 1 anterolisthesis of L4 on L5. 5.  Intact SI joints 6.  Intact pubic symphysis 7.  Intact proximal right and left femur. 8.  Moderate to severe diverticulosis involving the left colon and sigmoid colon segment.  This report was finalized on 11/10/2024 6:42 AM by Xavier Li MD.      CT Head Without Contrast    Result Date: 11/10/2024   1.  Mild generalized brain volume loss. 2.  No acute hemorrhage, mass, infarct, or edema. 3.  Chronic small vessel ischemic type changes in the white matter. 4.  No fracture or foreign body.  This report was finalized on 11/10/2024 6:39 AM by Xavier Li MD.         Procedures           ED Course                    No data recorded                             Medical Decision Making  Patient is a 78-year-old female who presents after an unwitnessed fall.  [facility] reports that patient was initially complaining of tailbone pain.  However upon patient's arrival she has no complaints.  Patient reports she was attempting to walk to the bathroom when she lost her balance and fell.  Patient denies any chest pain/pressure/tightness or any shortness of breath leading up to her fall as well as any dizziness/lightheadedness.  Patient presents with no complaints.  Patient presents EMS from [facility]    Amount and/or Complexity of Data Reviewed  Radiology: ordered.        Final diagnoses:   Fall, initial encounter       ED Disposition  ED Disposition       ED Disposition   Discharge    Condition   Stable    Comment   --               Vernon Kaur PA-C  [address] [place] [address]  [phone]    Schedule an appointment as soon as possible for a visit   As needed    [facility] EMERGENCY DEPARTMENT  [address] [place] [id number] [phone]  Go to   If symptoms  worsen         Medication List      No changes were made to your prescriptions during this visit.            Ade Soni, APRN  11/10/24 0685

## 2024-11-11 ENCOUNTER — LAB REQUISITION (OUTPATIENT)
Dept: LAB | Facility: HOSPITAL | Age: 79
End: 2024-11-11
Payer: MEDICARE

## 2024-11-11 LAB — AMMONIA BLD-SCNC: 102 UMOL/L (ref 11–51)

## 2024-11-11 PROCEDURE — 82140 ASSAY OF AMMONIA: CPT | Performed by: NURSE PRACTITIONER

## 2024-11-12 ENCOUNTER — APPOINTMENT (OUTPATIENT)
Dept: GENERAL RADIOLOGY | Facility: HOSPITAL | Age: 79
End: 2024-11-12
Payer: MEDICARE

## 2024-11-12 ENCOUNTER — APPOINTMENT (OUTPATIENT)
Dept: CT IMAGING | Facility: HOSPITAL | Age: 79
End: 2024-11-12
Payer: MEDICARE

## 2024-11-12 ENCOUNTER — APPOINTMENT (OUTPATIENT)
Dept: ULTRASOUND IMAGING | Facility: HOSPITAL | Age: 79
End: 2024-11-12
Payer: MEDICARE

## 2024-11-12 ENCOUNTER — HOSPITAL ENCOUNTER (EMERGENCY)
Facility: HOSPITAL | Age: 79
Discharge: HOME | End: 2024-11-12
Attending: EMERGENCY MEDICINE
Payer: MEDICARE

## 2024-11-12 VITALS
SYSTOLIC BLOOD PRESSURE: 100 MMHG | DIASTOLIC BLOOD PRESSURE: 78 MMHG | BODY MASS INDEX: 20.09 KG/M2 | TEMPERATURE: 97.8 F | OXYGEN SATURATION: 97 % | WEIGHT: 125 LBS | RESPIRATION RATE: 18 BRPM | HEIGHT: 66 IN | HEART RATE: 94 BPM

## 2024-11-12 DIAGNOSIS — I48.0 PAROXYSMAL ATRIAL FIBRILLATION: ICD-10-CM

## 2024-11-12 DIAGNOSIS — N39.0 ACUTE UTI: Primary | ICD-10-CM

## 2024-11-12 LAB
A-A DO2: 80.3 MMHG (ref 0–300)
ALBUMIN SERPL-MCNC: 3 G/DL (ref 3.5–5.2)
ALBUMIN/GLOB SERPL: 0.8 G/DL
ALP SERPL-CCNC: 224 U/L (ref 39–117)
ALT SERPL W P-5'-P-CCNC: 24 U/L (ref 1–33)
AMMONIA BLD-SCNC: 57 UMOL/L (ref 11–51)
ANION GAP SERPL CALCULATED.3IONS-SCNC: 12.7 MMOL/L (ref 5–15)
APTT PPP: 31.5 SECONDS (ref 26.5–34.5)
ARTERIAL PATENCY WRIST A: ABNORMAL
AST SERPL-CCNC: 45 U/L (ref 1–32)
ATMOSPHERIC PRESS: 731 MMHG
BACTERIA UR QL AUTO: ABNORMAL /HPF
BASE EXCESS BLDA CALC-SCNC: 1.7 MMOL/L (ref 0–2)
BASOPHILS # BLD AUTO: 0.02 10*3/MM3 (ref 0–0.2)
BASOPHILS NFR BLD AUTO: 0.4 % (ref 0–1.5)
BDY SITE: ABNORMAL
BILIRUB SERPL-MCNC: 4.3 MG/DL (ref 0–1.2)
BILIRUB UR QL STRIP: ABNORMAL
BUN SERPL-MCNC: 19 MG/DL (ref 8–23)
BUN/CREAT SERPL: 25 (ref 7–25)
CALCIUM SPEC-SCNC: 9 MG/DL (ref 8.6–10.5)
CHLORIDE SERPL-SCNC: 106 MMOL/L (ref 98–107)
CK SERPL-CCNC: 115 U/L (ref 20–180)
CLARITY UR: ABNORMAL
CO2 BLDA-SCNC: 25.6 MMOL/L (ref 22–33)
CO2 SERPL-SCNC: 22.3 MMOL/L (ref 22–29)
COHGB MFR BLD: 1.7 % (ref 0–5)
COLOR UR: ABNORMAL
CREAT SERPL-MCNC: 0.76 MG/DL (ref 0.57–1)
CRP SERPL-MCNC: 4.56 MG/DL (ref 0–0.5)
D-LACTATE SERPL-SCNC: 1.8 MMOL/L (ref 0.5–2)
DEPRECATED RDW RBC AUTO: 56.7 FL (ref 37–54)
EGFRCR SERPLBLD CKD-EPI 2021: 80.8 ML/MIN/1.73
EOSINOPHIL # BLD AUTO: 0.08 10*3/MM3 (ref 0–0.4)
EOSINOPHIL NFR BLD AUTO: 1.4 % (ref 0.3–6.2)
ERYTHROCYTE [DISTWIDTH] IN BLOOD BY AUTOMATED COUNT: 16.5 % (ref 12.3–15.4)
GAS FLOW AIRWAY: 3 LPM
GEN 5 2HR TROPONIN T REFLEX: 13 NG/L
GLOBULIN UR ELPH-MCNC: 3.8 GM/DL
GLUCOSE SERPL-MCNC: 105 MG/DL (ref 65–99)
GLUCOSE UR STRIP-MCNC: NEGATIVE MG/DL
HCO3 BLDA-SCNC: 24.6 MMOL/L (ref 20–26)
HCT VFR BLD AUTO: 31.9 % (ref 34–46.6)
HCT VFR BLD CALC: 27.4 % (ref 38–51)
HGB BLD-MCNC: 9.6 G/DL (ref 12–15.9)
HGB BLDA-MCNC: 8.9 G/DL (ref 13.5–17.5)
HGB UR QL STRIP.AUTO: ABNORMAL
HOLD SPECIMEN: NORMAL
HOLD SPECIMEN: NORMAL
HYALINE CASTS UR QL AUTO: ABNORMAL /LPF
IMM GRANULOCYTES # BLD AUTO: 0.01 10*3/MM3 (ref 0–0.05)
IMM GRANULOCYTES NFR BLD AUTO: 0.2 % (ref 0–0.5)
INHALED O2 CONCENTRATION: 32 %
INR PPP: 1.45 (ref 0.9–1.1)
KETONES UR QL STRIP: ABNORMAL
LEUKOCYTE ESTERASE UR QL STRIP.AUTO: ABNORMAL
LYMPHOCYTES # BLD AUTO: 0.81 10*3/MM3 (ref 0.7–3.1)
LYMPHOCYTES NFR BLD AUTO: 14.5 % (ref 19.6–45.3)
Lab: ABNORMAL
Lab: ABNORMAL
MAGNESIUM SERPL-MCNC: 1.8 MG/DL (ref 1.6–2.4)
MCH RBC QN AUTO: 28.5 PG (ref 26.6–33)
MCHC RBC AUTO-ENTMCNC: 30.1 G/DL (ref 31.5–35.7)
MCV RBC AUTO: 94.7 FL (ref 79–97)
METHGB BLD QL: 0.3 % (ref 0–3)
MODALITY: ABNORMAL
MONOCYTES # BLD AUTO: 0.75 10*3/MM3 (ref 0.1–0.9)
MONOCYTES NFR BLD AUTO: 13.4 % (ref 5–12)
NEUTROPHILS NFR BLD AUTO: 3.92 10*3/MM3 (ref 1.7–7)
NEUTROPHILS NFR BLD AUTO: 70.1 % (ref 42.7–76)
NITRITE UR QL STRIP: POSITIVE
NOTIFIED BY: ABNORMAL
NOTIFIED WHO: ABNORMAL
NRBC BLD AUTO-RTO: 0 /100 WBC (ref 0–0.2)
OXYHGB MFR BLDV: 96.7 % (ref 94–99)
PCO2 BLDA: 31.6 MM HG (ref 35–45)
PCO2 TEMP ADJ BLD: ABNORMAL MM[HG]
PH BLDA: 7.5 PH UNITS (ref 7.35–7.45)
PH UR STRIP.AUTO: 5.5 [PH] (ref 5–8)
PH, TEMP CORRECTED: ABNORMAL
PLATELET # BLD AUTO: 149 10*3/MM3 (ref 140–450)
PMV BLD AUTO: 10.7 FL (ref 6–12)
PO2 BLDA: 103 MM HG (ref 83–108)
PO2 TEMP ADJ BLD: ABNORMAL MM[HG]
POTASSIUM SERPL-SCNC: 3.6 MMOL/L (ref 3.5–5.2)
PROT SERPL-MCNC: 6.8 G/DL (ref 6–8.5)
PROT UR QL STRIP: ABNORMAL
PROTHROMBIN TIME: 17.7 SECONDS (ref 12.1–14.7)
QT INTERVAL: 278 MS
QT INTERVAL: 306 MS
RBC # BLD AUTO: 3.37 10*6/MM3 (ref 3.77–5.28)
RBC # UR STRIP: ABNORMAL /HPF
REF LAB TEST METHOD: ABNORMAL
SAO2 % BLDCOA: 98.7 % (ref 94–99)
SODIUM SERPL-SCNC: 141 MMOL/L (ref 136–145)
SP GR UR STRIP: 1.02 (ref 1–1.03)
SQUAMOUS #/AREA URNS HPF: ABNORMAL /HPF
T4 FREE SERPL-MCNC: 1.24 NG/DL (ref 0.92–1.68)
TROPONIN T DELTA: -1 NG/L
TROPONIN T SERPL HS-MCNC: 14 NG/L
TSH SERPL DL<=0.05 MIU/L-ACNC: 6.41 UIU/ML (ref 0.27–4.2)
UROBILINOGEN UR QL STRIP: ABNORMAL
VENTILATOR MODE: ABNORMAL
WBC # UR STRIP: ABNORMAL /HPF
WBC NRBC COR # BLD AUTO: 5.59 10*3/MM3 (ref 3.4–10.8)
WHOLE BLOOD HOLD COAG: NORMAL
WHOLE BLOOD HOLD SPECIMEN: NORMAL

## 2024-11-12 PROCEDURE — 96368 THER/DIAG CONCURRENT INF: CPT

## 2024-11-12 PROCEDURE — 83050 HGB METHEMOGLOBIN QUAN: CPT

## 2024-11-12 PROCEDURE — 71045 X-RAY EXAM CHEST 1 VIEW: CPT | Performed by: RADIOLOGY

## 2024-11-12 PROCEDURE — 93005 ELECTROCARDIOGRAM TRACING: CPT | Performed by: PHYSICIAN ASSISTANT

## 2024-11-12 PROCEDURE — 87186 SC STD MICRODIL/AGAR DIL: CPT | Performed by: PHYSICIAN ASSISTANT

## 2024-11-12 PROCEDURE — 36415 COLL VENOUS BLD VENIPUNCTURE: CPT

## 2024-11-12 PROCEDURE — 82375 ASSAY CARBOXYHB QUANT: CPT

## 2024-11-12 PROCEDURE — 49083 ABD PARACENTESIS W/IMAGING: CPT | Performed by: RADIOLOGY

## 2024-11-12 PROCEDURE — 87086 URINE CULTURE/COLONY COUNT: CPT | Performed by: PHYSICIAN ASSISTANT

## 2024-11-12 PROCEDURE — 84484 ASSAY OF TROPONIN QUANT: CPT | Performed by: PHYSICIAN ASSISTANT

## 2024-11-12 PROCEDURE — 84439 ASSAY OF FREE THYROXINE: CPT | Performed by: PHYSICIAN ASSISTANT

## 2024-11-12 PROCEDURE — 99284 EMERGENCY DEPT VISIT MOD MDM: CPT

## 2024-11-12 PROCEDURE — 87040 BLOOD CULTURE FOR BACTERIA: CPT | Performed by: PHYSICIAN ASSISTANT

## 2024-11-12 PROCEDURE — 82140 ASSAY OF AMMONIA: CPT | Performed by: PHYSICIAN ASSISTANT

## 2024-11-12 PROCEDURE — 70450 CT HEAD/BRAIN W/O DYE: CPT

## 2024-11-12 PROCEDURE — 85610 PROTHROMBIN TIME: CPT | Performed by: PHYSICIAN ASSISTANT

## 2024-11-12 PROCEDURE — 93010 ELECTROCARDIOGRAM REPORT: CPT | Performed by: INTERNAL MEDICINE

## 2024-11-12 PROCEDURE — 25010000002 CEFTRIAXONE PER 250 MG: Performed by: PHYSICIAN ASSISTANT

## 2024-11-12 PROCEDURE — 96365 THER/PROPH/DIAG IV INF INIT: CPT

## 2024-11-12 PROCEDURE — 96366 THER/PROPH/DIAG IV INF ADDON: CPT

## 2024-11-12 PROCEDURE — 85025 COMPLETE CBC W/AUTO DIFF WBC: CPT | Performed by: PHYSICIAN ASSISTANT

## 2024-11-12 PROCEDURE — 85730 THROMBOPLASTIN TIME PARTIAL: CPT | Performed by: PHYSICIAN ASSISTANT

## 2024-11-12 PROCEDURE — 36600 WITHDRAWAL OF ARTERIAL BLOOD: CPT

## 2024-11-12 PROCEDURE — 87077 CULTURE AEROBIC IDENTIFY: CPT | Performed by: PHYSICIAN ASSISTANT

## 2024-11-12 PROCEDURE — 81001 URINALYSIS AUTO W/SCOPE: CPT | Performed by: PHYSICIAN ASSISTANT

## 2024-11-12 PROCEDURE — 82805 BLOOD GASES W/O2 SATURATION: CPT

## 2024-11-12 PROCEDURE — P9612 CATHETERIZE FOR URINE SPEC: HCPCS

## 2024-11-12 PROCEDURE — 83605 ASSAY OF LACTIC ACID: CPT | Performed by: PHYSICIAN ASSISTANT

## 2024-11-12 PROCEDURE — 25810000003 SODIUM CHLORIDE 0.9 % SOLUTION: Performed by: PHYSICIAN ASSISTANT

## 2024-11-12 PROCEDURE — 71045 X-RAY EXAM CHEST 1 VIEW: CPT

## 2024-11-12 PROCEDURE — 84443 ASSAY THYROID STIM HORMONE: CPT | Performed by: PHYSICIAN ASSISTANT

## 2024-11-12 PROCEDURE — 76942 ECHO GUIDE FOR BIOPSY: CPT

## 2024-11-12 PROCEDURE — 83735 ASSAY OF MAGNESIUM: CPT | Performed by: PHYSICIAN ASSISTANT

## 2024-11-12 PROCEDURE — 82550 ASSAY OF CK (CPK): CPT | Performed by: PHYSICIAN ASSISTANT

## 2024-11-12 PROCEDURE — 86140 C-REACTIVE PROTEIN: CPT | Performed by: PHYSICIAN ASSISTANT

## 2024-11-12 PROCEDURE — 70450 CT HEAD/BRAIN W/O DYE: CPT | Performed by: RADIOLOGY

## 2024-11-12 PROCEDURE — 80053 COMPREHEN METABOLIC PANEL: CPT | Performed by: PHYSICIAN ASSISTANT

## 2024-11-12 RX ORDER — SODIUM CHLORIDE 0.9 % (FLUSH) 0.9 %
10 SYRINGE (ML) INJECTION AS NEEDED
Status: DISCONTINUED | OUTPATIENT
Start: 2024-11-12 | End: 2024-11-12 | Stop reason: HOSPADM

## 2024-11-12 RX ORDER — CEFDINIR 300 MG/1
300 CAPSULE ORAL 2 TIMES DAILY
Qty: 14 CAPSULE | Refills: 0 | Status: SHIPPED | OUTPATIENT
Start: 2024-11-12 | End: 2024-11-19

## 2024-11-12 RX ORDER — METOPROLOL TARTRATE 25 MG/1
25 TABLET, FILM COATED ORAL ONCE
Status: COMPLETED | OUTPATIENT
Start: 2024-11-12 | End: 2024-11-12

## 2024-11-12 RX ORDER — METOPROLOL TARTRATE 25 MG/1
25 TABLET, FILM COATED ORAL 2 TIMES DAILY
Qty: 60 TABLET | Refills: 0 | Status: SHIPPED | OUTPATIENT
Start: 2024-11-12 | End: 2024-11-20

## 2024-11-12 RX ORDER — LACTULOSE 10 G/15ML
20 SOLUTION ORAL ONCE
Status: COMPLETED | OUTPATIENT
Start: 2024-11-12 | End: 2024-11-12

## 2024-11-12 RX ADMIN — LACTULOSE 20 G: 20 SOLUTION ORAL at 13:42

## 2024-11-12 RX ADMIN — SODIUM CHLORIDE 2000 MG: 9 INJECTION, SOLUTION INTRAVENOUS at 13:43

## 2024-11-12 RX ADMIN — SODIUM CHLORIDE 5 MG/HR: 900 INJECTION, SOLUTION INTRAVENOUS at 12:21

## 2024-11-12 RX ADMIN — METOPROLOL TARTRATE 25 MG: 25 TABLET, FILM COATED ORAL at 15:30

## 2024-11-12 RX ADMIN — SODIUM CHLORIDE 1000 ML: 9 INJECTION, SOLUTION INTRAVENOUS at 12:20

## 2024-11-12 NOTE — DISCHARGE INSTRUCTIONS
Follow-up with her attending provider at the next available appointment.  She has been started on cefdinir for a UTI.  She can start this tomorrow.  She is also being started on metoprolol due to being in atrial fibrillation today.  She can take this twice a day.  Please hold the medication for heart rate less than 60 or a blood pressure less than 100/60.  She was not able to have her paracentesis today.

## 2024-11-12 NOTE — ED PROVIDER NOTES
Subjective   History of Present Illness  78-year-old female who presents to the ED today from [facility] for altered mental status.  They report that she has had decreased level of consciousness today and has been lethargic.  The patient has known cirrhosis of the liver and has had issues with hepatic encephalopathy in the past.  The patient is currently on hospice care at her skilled nursing facility.  The patient is awake and alert but confused.  She denies any pain at this time.  The patient is scheduled for a paracentesis tomorrow.  Family requested that she have that today if possible.  They are also requesting that her ammonia level be checked.  The patient does have a history of non-small cell cancer of the left lung.  She also has a history of CHF, COPD, diabetes, thyroid disease and fibromyalgia.    History provided by:  Patient and nursing home  History limited by:  Mental status change  Altered Mental Status  Presenting symptoms: lethargy    Severity:  Moderate  Most recent episode:  Today  Episode history:  Continuous  Timing:  Constant  Progression:  Unchanged  Chronicity:  New  Context: nursing home resident    Associated symptoms: no abdominal pain, no difficulty breathing, no fever, no headaches and no vomiting        Review of Systems   Unable to perform ROS: Mental status change   Constitutional:  Negative for fever.   Gastrointestinal:  Negative for abdominal pain and vomiting.   Neurological:  Negative for headaches.       Past Medical History:   Diagnosis Date    Anemia     Arthritis     Asthma     Cancer     squamous cell carinoma lung Left    CHF (congestive heart failure)     Cirrhosis     COPD (chronic obstructive pulmonary disease)     Diabetes mellitus     Disease of thyroid gland     Diverticulitis     Encounter for blood transfusion     Fibromyalgia     Headache     Low back pain     Osteoporosis        Allergies   Allergen Reactions    Ciprofloxacin Nausea Only, Rash and  Other (See Comments)     Unknown-flu symptoms    Sertraline Other (See Comments)       Past Surgical History:   Procedure Laterality Date    CHOLECYSTECTOMY  2012    HYSTERECTOMY  2003    PORTACATH PLACEMENT N/A 7/25/2024    Procedure: INSERTION OF PORTACATH;  Surgeon: Austen Mendez MD;  Location: [facility];  Service: General;  Laterality: N/A;    SINUS SURGERY  2006       Family History   Problem Relation Age of Onset    COPD Mother     Hypertension Father     Heart disease Father        Social History     Socioeconomic History    Marital status:    Tobacco Use    Smoking status: Former     Current packs/day: 0.25     Average packs/day: 0.3 packs/day for 10.9 years (2.7 ttl pk-yrs)     Types: Cigarettes     Start date: 2014     Passive exposure: Past    Smokeless tobacco: Never   Vaping Use    Vaping status: Never Used   Substance and Sexual Activity    Alcohol use: No    Drug use: No    Sexual activity: Defer           Objective   Physical Exam  Vitals and nursing note reviewed.   Constitutional:       General: She is not in acute distress.     Appearance: She is ill-appearing. She is not diaphoretic.   HENT:      Head: Normocephalic and atraumatic.   Eyes:      General: Scleral icterus present.      Extraocular Movements: Extraocular movements intact.      Pupils: Pupils are equal, round, and reactive to light.   Cardiovascular:      Rate and Rhythm: Tachycardia present. Rhythm irregular.      Heart sounds: Normal heart sounds.   Pulmonary:      Effort: Pulmonary effort is normal.      Breath sounds: Normal breath sounds.   Abdominal:      General: Bowel sounds are normal. There is distension (ascites noted).      Palpations: Abdomen is soft.      Tenderness: There is no abdominal tenderness.   Musculoskeletal:         General: Normal range of motion.      Cervical back: Normal range of motion and neck supple.   Skin:     General: Skin is warm and dry.      Capillary Refill: Capillary refill takes  less than 2 seconds.   Neurological:      Mental Status: She is alert. She is disoriented and confused.      Comments: Disoriented to place and says that Miguel Angel is the president.  She does know her name and date of birth and knows what year it is.         Procedures       Results for orders placed or performed during the hospital encounter of 11/12/24   ECG 12 Lead Tachycardia    Collection Time: 11/12/24 11:50 AM   Result Value Ref Range    QT Interval 278 ms   Blood Gas, Arterial With Co-Ox    Collection Time: 11/12/24 12:08 PM    Specimen: Arterial Blood   Result Value Ref Range    Site Left Brachial     Gal's Test N/A     pH, Arterial 7.501 (H) 7.350 - 7.450 pH units    pCO2, Arterial 31.6 (L) 35.0 - 45.0 mm Hg    pO2, Arterial 103.0 83.0 - 108.0 mm Hg    HCO3, Arterial 24.6 20.0 - 26.0 mmol/L    Base Excess, Arterial 1.7 0.0 - 2.0 mmol/L    O2 Saturation, Arterial 98.7 94.0 - 99.0 %    Hemoglobin, Blood Gas 8.9 (L) 13.5 - 17.5 g/dL    Hematocrit, Blood Gas 27.4 (L) 38.0 - 51.0 %    Oxyhemoglobin 96.7 94 - 99 %    Methemoglobin 0.30 0.00 - 3.00 %    Carboxyhemoglobin 1.7 0 - 5 %    A-a DO2 80.3 0.0 - 300.0 mmHg    CO2 Content 25.6 22 - 33 mmol/L    Barometric Pressure for Blood Gas 731 mmHg    Modality Nasal Cannula     FIO2 32 %    Flow Rate 3.0 lpm    Ventilator Mode NA     Notified Who ER PA AND RN     Notified By [id number]     Notified Time 11/12/2024 12:12     Collected by [id number]     pH, Temp Corrected      pCO2, Temperature Corrected      pO2, Temperature Corrected     Comprehensive Metabolic Panel    Collection Time: 11/12/24 12:25 PM    Specimen: Blood   Result Value Ref Range    Glucose 105 (H) 65 - 99 mg/dL    BUN 19 8 - 23 mg/dL    Creatinine 0.76 0.57 - 1.00 mg/dL    Sodium 141 136 - 145 mmol/L    Potassium 3.6 3.5 - 5.2 mmol/L    Chloride 106 98 - 107 mmol/L    CO2 22.3 22.0 - 29.0 mmol/L    Calcium 9.0 8.6 - 10.5 mg/dL    Total Protein 6.8 6.0 - 8.5 g/dL    Albumin 3.0 (L) 3.5 - 5.2 g/dL    ALT  (SGPT) 24 1 - 33 U/L    AST (SGOT) 45 (H) 1 - 32 U/L    Alkaline Phosphatase 224 (H) 39 - 117 U/L    Total Bilirubin 4.3 (H) 0.0 - 1.2 mg/dL    Globulin 3.8 gm/dL    A/G Ratio 0.8 g/dL    BUN/Creatinine Ratio 25.0 7.0 - 25.0    Anion Gap 12.7 5.0 - 15.0 mmol/L    eGFR 80.8 >60.0 mL/min/1.73   Protime-INR    Collection Time: 11/12/24 12:25 PM    Specimen: Blood   Result Value Ref Range    Protime 17.7 (H) 12.1 - 14.7 Seconds    INR 1.45 (H) 0.90 - 1.10   aPTT    Collection Time: 11/12/24 12:25 PM    Specimen: Blood   Result Value Ref Range    PTT 31.5 26.5 - 34.5 seconds   High Sensitivity Troponin T    Collection Time: 11/12/24 12:25 PM    Specimen: Blood   Result Value Ref Range    HS Troponin T 14 (H) <14 ng/L   Lactic Acid, Plasma    Collection Time: 11/12/24 12:25 PM    Specimen: Blood   Result Value Ref Range    Lactate 1.8 0.5 - 2.0 mmol/L   C-reactive Protein    Collection Time: 11/12/24 12:25 PM    Specimen: Blood   Result Value Ref Range    C-Reactive Protein 4.56 (H) 0.00 - 0.50 mg/dL   Ammonia    Collection Time: 11/12/24 12:25 PM    Specimen: Blood   Result Value Ref Range    Ammonia 57 (H) 11 - 51 umol/L   CK    Collection Time: 11/12/24 12:25 PM    Specimen: Blood   Result Value Ref Range    Creatine Kinase 115 20 - 180 U/L   Magnesium    Collection Time: 11/12/24 12:25 PM    Specimen: Blood   Result Value Ref Range    Magnesium 1.8 1.6 - 2.4 mg/dL   TSH    Collection Time: 11/12/24 12:25 PM    Specimen: Blood   Result Value Ref Range    TSH 6.410 (H) 0.270 - 4.200 uIU/mL   T4, Free    Collection Time: 11/12/24 12:25 PM    Specimen: Blood   Result Value Ref Range    Free T4 1.24 0.92 - 1.68 ng/dL   CBC Auto Differential    Collection Time: 11/12/24 12:25 PM    Specimen: Blood   Result Value Ref Range    WBC 5.59 3.40 - 10.80 10*3/mm3    RBC 3.37 (L) 3.77 - 5.28 10*6/mm3    Hemoglobin 9.6 (L) 12.0 - 15.9 g/dL    Hematocrit 31.9 (L) 34.0 - 46.6 %    MCV 94.7 79.0 - 97.0 fL    MCH 28.5 26.6 - 33.0 pg     MCHC 30.1 (L) 31.5 - 35.7 g/dL    RDW 16.5 (H) 12.3 - 15.4 %    RDW-SD 56.7 (H) 37.0 - 54.0 fl    MPV 10.7 6.0 - 12.0 fL    Platelets 149 140 - 450 10*3/mm3    Neutrophil % 70.1 42.7 - 76.0 %    Lymphocyte % 14.5 (L) 19.6 - 45.3 %    Monocyte % 13.4 (H) 5.0 - 12.0 %    Eosinophil % 1.4 0.3 - 6.2 %    Basophil % 0.4 0.0 - 1.5 %    Immature Grans % 0.2 0.0 - 0.5 %    Neutrophils, Absolute 3.92 1.70 - 7.00 10*3/mm3    Lymphocytes, Absolute 0.81 0.70 - 3.10 10*3/mm3    Monocytes, Absolute 0.75 0.10 - 0.90 10*3/mm3    Eosinophils, Absolute 0.08 0.00 - 0.40 10*3/mm3    Basophils, Absolute 0.02 0.00 - 0.20 10*3/mm3    Immature Grans, Absolute 0.01 0.00 - 0.05 10*3/mm3    nRBC 0.0 0.0 - 0.2 /100 WBC   Green Top (Gel)    Collection Time: 11/12/24 12:25 PM   Result Value Ref Range    Extra Tube Hold for add-ons.    Lavender Top    Collection Time: 11/12/24 12:25 PM   Result Value Ref Range    Extra Tube hold for add-on    Gold Top - SST    Collection Time: 11/12/24 12:25 PM   Result Value Ref Range    Extra Tube Hold for add-ons.    Light Blue Top    Collection Time: 11/12/24 12:25 PM   Result Value Ref Range    Extra Tube Hold for add-ons.    Urinalysis With Culture If Indicated - Straight Cath    Collection Time: 11/12/24 12:39 PM    Specimen: Straight Cath; Urine   Result Value Ref Range    Color, UA Orange (A) Yellow, Straw    Appearance, UA Turbid (A) Clear    pH, UA 5.5 5.0 - 8.0    Specific Gravity, UA 1.020 1.005 - 1.030    Glucose, UA Negative Negative    Ketones, UA Trace (A) Negative    Bilirubin, UA Small (1+) (A) Negative    Blood, UA Trace (A) Negative    Protein, UA Trace (A) Negative    Leuk Esterase, UA Large (3+) (A) Negative    Nitrite, UA Positive (A) Negative    Urobilinogen, UA 2.0 E.U./dL (A) 0.2 - 1.0 E.U./dL   Urinalysis, Microscopic Only - Straight Cath    Collection Time: 11/12/24 12:39 PM    Specimen: Straight Cath; Urine   Result Value Ref Range    RBC, UA 6-10 (A) None Seen, 0-2 /HPF    WBC, UA  Too Numerous to Count (A) None Seen, 0-2 /HPF    Bacteria, UA 3+ (A) None Seen /HPF    Squamous Epithelial Cells, UA 31-50 (A) None Seen, 0-2 /HPF    Hyaline Casts, UA None Seen None Seen /LPF    Methodology Manual Light Microscopy    ECG 12 Lead Rhythm Change    Collection Time: 11/12/24  1:55 PM   Result Value Ref Range    QT Interval 306 ms   High Sensitivity Troponin T 2Hr    Collection Time: 11/12/24  3:25 PM    Specimen: Blood   Result Value Ref Range    HS Troponin T 13 <14 ng/L    Troponin T Delta -1 >=-4 - <+4 ng/L          ED Course  ED Course as of 11/12/24 1734   Tue Nov 12, 2024   1242 CT Head Without Contrast  FINDINGS:    BRAIN AND EXTRA-AXIAL SPACES:  Areas of decreased attenuation in the  deep cerebral white matter are consistent with small vessel  ischemic/degenerative changes.  No hemorrhage.    BONES/JOINTS:  Unremarkable as visualized.  No acute fracture.    SOFT TISSUES:  Unremarkable as visualized.    SINUSES:  Unremarkable as visualized.  No acute sinusitis.    MASTOID AIR CELLS:  Unremarkable as visualized.  No mastoid effusion.     IMPRESSION:    Small vessel ischemic/degenerative changes.   [AH]   1242 XR Chest 1 View  FINDINGS:    LUNGS AND PLEURAL SPACES:  Minimal left lower lobe airspace disease in  the lingula.  Coarsened interstitial markings noted throughout the  lungs.  No consolidation.  No pneumothorax.    HEART:  Unremarkable as visualized.  No cardiomegaly.    MEDIASTINUM:  Unremarkable as visualized.  Normal mediastinal contour.    BONES/JOINTS:  Unremarkable as visualized.  No acute fracture.    TUBES, LINES AND DEVICES:  Right Port-A-Cath with tip in SVC.     IMPRESSION:  1.  Minimal left lower lobe airspace disease in the lingula.  2.  Coarsened interstitial markings noted throughout the lungs.   [AH]   1506 Dr. Alvarenga evaluated the patient for paracentesis.  She did not have enough fluid to perform at this time. [AH]   1522 US Paracentesis  FINDINGS:    CONSENT:  The risks,  benefits and alternatives to image-guided  paracentesis were explained to the patient who understood and elected to  proceed.  The risks discussed included but were not limited to bleeding,  infection, bowel perforation, hypotension and pain.  Written consent was  obtained.  FINDINGS:  Multiple locations within all 4 quadrants were evaluated and  no large pocket to safely perform paracentesis was identified. There is  small to moderate fluid but bowel loops are present in all these  regions.     IMPRESSION:    Multiple locations within all 4 quadrants were evaluated and no large  pocket to safely perform paracentesis was identified. There is small to  moderate fluid but bowel loops are present in all these regions.   [AH]      ED Course User Index  [AH] Danica Teixeira, PA                    No data recorded                             Medical Decision Making  78-year-old female who presents to the ED today from [facility] for altered mental status.  Patient was found to have a UTI.  She was given IV fluids as well as IV Rocephin.  She did present to the ED today in A-fib with RVR.  I cannot find a previous history of atrial fibrillation.  She was started on IV Cardizem and her rate was controlled on 5 mg/h.  I did give her 25 mg of metoprolol p.o. and was able to stop the Cardizem IV and her heart rate remained stable.  She did convert back to a sinus rhythm.  CT of the head showed no acute abnormalities.  Chest x-ray was unremarkable.  Radiology did attempt to do a paracentesis however the patient no large pocket to safely perform the paracentesis, bowel loops were present in all quadrants.  Ammonia level was 57 which is improved from a recent check a few days ago.  She was given a dose of lactulose in the ED.  I discussed the patient with Dr. Shepherd with our hospital service team.  He advised the patient can be discharged back to her skilled nursing facility to continue her hospice care.  He advised to  start her on metoprolol 25 mg twice a day for rate control.  He advised to not start her on any anticoagulation due to not being able to rule out brain metastases from her known history of cancer.  She will be started on cefdinir to treat her UTI with a urine culture pending.  Patient was advised she can return to the ED at any time if symptoms change or worsen.    Problems Addressed:  Acute UTI: complicated acute illness or injury  Paroxysmal atrial fibrillation: complicated acute illness or injury    Amount and/or Complexity of Data Reviewed  Labs: ordered.  Radiology: ordered. Decision-making details documented in ED Course.  ECG/medicine tests: ordered.    Risk  Prescription drug management.        Final diagnoses:   Acute UTI   Paroxysmal atrial fibrillation       ED Disposition  ED Disposition       ED Disposition   Discharge    Condition   Stable    Comment   --               Jacqueline Ortega MD  [address] [place] [id number] [phone]    Schedule an appointment as soon as possible for a visit in 2 days           Medication List        New Prescriptions      cefdinir 300 MG capsule  Commonly known as: OMNICEF  Take 1 capsule by mouth 2 (Two) Times a Day for 7 days.     metoprolol tartrate 25 MG tablet  Commonly known as: LOPRESSOR  Take 1 tablet by mouth 2 (Two) Times a Day. Hold for heart rate less than 60 or blood pressure less than 100/60               Where to Get Your Medications        You can get these medications from any pharmacy    Bring a paper prescription for each of these medications  cefdinir 300 MG capsule  metoprolol tartrate 25 MG tablet            Danica Teixeira PA  11/12/24 [id number]

## 2024-11-15 ENCOUNTER — HOSPITAL ENCOUNTER (EMERGENCY)
Facility: HOSPITAL | Age: 79
Discharge: HOME | End: 2024-11-16
Attending: EMERGENCY MEDICINE
Payer: MEDICARE

## 2024-11-15 ENCOUNTER — APPOINTMENT (OUTPATIENT)
Dept: GENERAL RADIOLOGY | Facility: HOSPITAL | Age: 79
End: 2024-11-15
Payer: MEDICARE

## 2024-11-15 ENCOUNTER — LAB REQUISITION (OUTPATIENT)
Dept: LAB | Facility: HOSPITAL | Age: 79
End: 2024-11-15
Payer: MEDICARE

## 2024-11-15 DIAGNOSIS — R04.2 HEMOPTYSIS: Primary | ICD-10-CM

## 2024-11-15 DIAGNOSIS — R18.8 OTHER ASCITES: ICD-10-CM

## 2024-11-15 DIAGNOSIS — R18.8 CIRRHOSIS OF LIVER WITH ASCITES, UNSPECIFIED HEPATIC CIRRHOSIS TYPE: ICD-10-CM

## 2024-11-15 DIAGNOSIS — G89.3 NEOPLASM RELATED PAIN (ACUTE) (CHRONIC): ICD-10-CM

## 2024-11-15 DIAGNOSIS — C34.90 MALIGNANT NEOPLASM OF UNSPECIFIED PART OF UNSPECIFIED BRONCHUS OR LUNG: ICD-10-CM

## 2024-11-15 DIAGNOSIS — J44.9 CHRONIC OBSTRUCTIVE PULMONARY DISEASE, UNSPECIFIED: ICD-10-CM

## 2024-11-15 DIAGNOSIS — K74.60 CIRRHOSIS OF LIVER WITH ASCITES, UNSPECIFIED HEPATIC CIRRHOSIS TYPE: ICD-10-CM

## 2024-11-15 DIAGNOSIS — K74.60 UNSPECIFIED CIRRHOSIS OF LIVER: ICD-10-CM

## 2024-11-15 LAB
ALBUMIN SERPL-MCNC: 2.9 G/DL (ref 3.5–5.2)
ALBUMIN/GLOB SERPL: 0.8 G/DL
ALP SERPL-CCNC: 215 U/L (ref 39–117)
ALT SERPL W P-5'-P-CCNC: 23 U/L (ref 1–33)
AMMONIA BLD-SCNC: 77 UMOL/L (ref 11–51)
ANION GAP SERPL CALCULATED.3IONS-SCNC: 9.6 MMOL/L (ref 5–15)
AST SERPL-CCNC: 37 U/L (ref 1–32)
BACTERIA SPEC AEROBE CULT: ABNORMAL
BACTERIA SPEC AEROBE CULT: ABNORMAL
BASOPHILS # BLD AUTO: 0.03 10*3/MM3 (ref 0–0.2)
BASOPHILS # BLD AUTO: 0.07 10*3/MM3 (ref 0–0.2)
BASOPHILS NFR BLD AUTO: 0.6 % (ref 0–1.5)
BASOPHILS NFR BLD AUTO: 0.9 % (ref 0–1.5)
BILIRUB SERPL-MCNC: 2.2 MG/DL (ref 0–1.2)
BUN SERPL-MCNC: 27 MG/DL (ref 8–23)
BUN/CREAT SERPL: 32.9 (ref 7–25)
CALCIUM SPEC-SCNC: 8.8 MG/DL (ref 8.6–10.5)
CHLORIDE SERPL-SCNC: 109 MMOL/L (ref 98–107)
CO2 SERPL-SCNC: 24.4 MMOL/L (ref 22–29)
CREAT SERPL-MCNC: 0.82 MG/DL (ref 0.57–1)
DEPRECATED RDW RBC AUTO: 57.4 FL (ref 37–54)
DEPRECATED RDW RBC AUTO: 59.5 FL (ref 37–54)
EGFRCR SERPLBLD CKD-EPI 2021: 73.8 ML/MIN/1.73
EOSINOPHIL # BLD AUTO: 0.11 10*3/MM3 (ref 0–0.4)
EOSINOPHIL # BLD AUTO: 0.15 10*3/MM3 (ref 0–0.4)
EOSINOPHIL NFR BLD AUTO: 1.9 % (ref 0.3–6.2)
EOSINOPHIL NFR BLD AUTO: 2.1 % (ref 0.3–6.2)
ERYTHROCYTE [DISTWIDTH] IN BLOOD BY AUTOMATED COUNT: 17.4 % (ref 12.3–15.4)
ERYTHROCYTE [DISTWIDTH] IN BLOOD BY AUTOMATED COUNT: 17.7 % (ref 12.3–15.4)
GLOBULIN UR ELPH-MCNC: 3.5 GM/DL
GLUCOSE SERPL-MCNC: 112 MG/DL (ref 65–99)
HCT VFR BLD AUTO: 27.9 % (ref 34–46.6)
HCT VFR BLD AUTO: 32.3 % (ref 34–46.6)
HGB BLD-MCNC: 8.4 G/DL (ref 12–15.9)
HGB BLD-MCNC: 9.8 G/DL (ref 12–15.9)
IMM GRANULOCYTES # BLD AUTO: 0.02 10*3/MM3 (ref 0–0.05)
IMM GRANULOCYTES # BLD AUTO: 0.02 10*3/MM3 (ref 0–0.05)
IMM GRANULOCYTES NFR BLD AUTO: 0.3 % (ref 0–0.5)
IMM GRANULOCYTES NFR BLD AUTO: 0.4 % (ref 0–0.5)
LYMPHOCYTES # BLD AUTO: 0.86 10*3/MM3 (ref 0.7–3.1)
LYMPHOCYTES # BLD AUTO: 1.17 10*3/MM3 (ref 0.7–3.1)
LYMPHOCYTES NFR BLD AUTO: 15 % (ref 19.6–45.3)
LYMPHOCYTES NFR BLD AUTO: 16.6 % (ref 19.6–45.3)
MCH RBC QN AUTO: 28.7 PG (ref 26.6–33)
MCH RBC QN AUTO: 29.3 PG (ref 26.6–33)
MCHC RBC AUTO-ENTMCNC: 30.1 G/DL (ref 31.5–35.7)
MCHC RBC AUTO-ENTMCNC: 30.3 G/DL (ref 31.5–35.7)
MCV RBC AUTO: 95.2 FL (ref 79–97)
MCV RBC AUTO: 96.4 FL (ref 79–97)
MONOCYTES # BLD AUTO: 0.66 10*3/MM3 (ref 0.1–0.9)
MONOCYTES # BLD AUTO: 1.13 10*3/MM3 (ref 0.1–0.9)
MONOCYTES NFR BLD AUTO: 12.7 % (ref 5–12)
MONOCYTES NFR BLD AUTO: 14.5 % (ref 5–12)
NEUTROPHILS NFR BLD AUTO: 3.5 10*3/MM3 (ref 1.7–7)
NEUTROPHILS NFR BLD AUTO: 5.25 10*3/MM3 (ref 1.7–7)
NEUTROPHILS NFR BLD AUTO: 67.4 % (ref 42.7–76)
NEUTROPHILS NFR BLD AUTO: 67.6 % (ref 42.7–76)
NRBC BLD AUTO-RTO: 0 /100 WBC (ref 0–0.2)
NRBC BLD AUTO-RTO: 0 /100 WBC (ref 0–0.2)
PLATELET # BLD AUTO: 133 10*3/MM3 (ref 140–450)
PLATELET # BLD AUTO: 144 10*3/MM3 (ref 140–450)
PMV BLD AUTO: 10.8 FL (ref 6–12)
PMV BLD AUTO: 11 FL (ref 6–12)
POTASSIUM SERPL-SCNC: 3.4 MMOL/L (ref 3.5–5.2)
PROT SERPL-MCNC: 6.4 G/DL (ref 6–8.5)
RBC # BLD AUTO: 2.93 10*6/MM3 (ref 3.77–5.28)
RBC # BLD AUTO: 3.35 10*6/MM3 (ref 3.77–5.28)
SODIUM SERPL-SCNC: 143 MMOL/L (ref 136–145)
WBC NRBC COR # BLD AUTO: 5.18 10*3/MM3 (ref 3.4–10.8)
WBC NRBC COR # BLD AUTO: 7.79 10*3/MM3 (ref 3.4–10.8)

## 2024-11-15 PROCEDURE — 94640 AIRWAY INHALATION TREATMENT: CPT

## 2024-11-15 PROCEDURE — 80053 COMPREHEN METABOLIC PANEL: CPT | Performed by: INTERNAL MEDICINE

## 2024-11-15 PROCEDURE — 85025 COMPLETE CBC W/AUTO DIFF WBC: CPT | Performed by: EMERGENCY MEDICINE

## 2024-11-15 PROCEDURE — 94761 N-INVAS EAR/PLS OXIMETRY MLT: CPT

## 2024-11-15 PROCEDURE — 80053 COMPREHEN METABOLIC PANEL: CPT | Performed by: EMERGENCY MEDICINE

## 2024-11-15 PROCEDURE — 87040 BLOOD CULTURE FOR BACTERIA: CPT | Performed by: EMERGENCY MEDICINE

## 2024-11-15 PROCEDURE — 96365 THER/PROPH/DIAG IV INF INIT: CPT

## 2024-11-15 PROCEDURE — 99285 EMERGENCY DEPT VISIT HI MDM: CPT

## 2024-11-15 PROCEDURE — 83605 ASSAY OF LACTIC ACID: CPT | Performed by: EMERGENCY MEDICINE

## 2024-11-15 PROCEDURE — 85025 COMPLETE CBC W/AUTO DIFF WBC: CPT | Performed by: INTERNAL MEDICINE

## 2024-11-15 PROCEDURE — 82140 ASSAY OF AMMONIA: CPT | Performed by: INTERNAL MEDICINE

## 2024-11-15 PROCEDURE — 36415 COLL VENOUS BLD VENIPUNCTURE: CPT

## 2024-11-15 PROCEDURE — 96375 TX/PRO/DX INJ NEW DRUG ADDON: CPT

## 2024-11-15 PROCEDURE — 71045 X-RAY EXAM CHEST 1 VIEW: CPT

## 2024-11-15 PROCEDURE — 94799 UNLISTED PULMONARY SVC/PX: CPT

## 2024-11-15 RX ORDER — IPRATROPIUM BROMIDE AND ALBUTEROL SULFATE 2.5; .5 MG/3ML; MG/3ML
3 SOLUTION RESPIRATORY (INHALATION) ONCE
Status: COMPLETED | OUTPATIENT
Start: 2024-11-15 | End: 2024-11-15

## 2024-11-15 RX ORDER — SODIUM CHLORIDE 0.9 % (FLUSH) 0.9 %
10 SYRINGE (ML) INJECTION AS NEEDED
Status: DISCONTINUED | OUTPATIENT
Start: 2024-11-15 | End: 2024-11-16 | Stop reason: HOSPADM

## 2024-11-15 RX ORDER — DEXAMETHASONE SODIUM PHOSPHATE 10 MG/ML
10 INJECTION, SOLUTION INTRAMUSCULAR; INTRAVENOUS ONCE
Status: COMPLETED | OUTPATIENT
Start: 2024-11-15 | End: 2024-11-16

## 2024-11-15 RX ADMIN — IPRATROPIUM BROMIDE AND ALBUTEROL SULFATE 3 ML: .5; 3 SOLUTION RESPIRATORY (INHALATION) at 23:58

## 2024-11-16 ENCOUNTER — APPOINTMENT (OUTPATIENT)
Dept: CT IMAGING | Facility: HOSPITAL | Age: 79
End: 2024-11-16
Payer: MEDICARE

## 2024-11-16 VITALS
WEIGHT: 125 LBS | DIASTOLIC BLOOD PRESSURE: 62 MMHG | SYSTOLIC BLOOD PRESSURE: 110 MMHG | HEART RATE: 67 BPM | OXYGEN SATURATION: 100 % | BODY MASS INDEX: 20.09 KG/M2 | RESPIRATION RATE: 16 BRPM | HEIGHT: 66 IN | TEMPERATURE: 98.9 F

## 2024-11-16 LAB
ALBUMIN SERPL-MCNC: 2.7 G/DL (ref 3.5–5.2)
ALBUMIN/GLOB SERPL: 0.7 G/DL
ALP SERPL-CCNC: 224 U/L (ref 39–117)
ALT SERPL W P-5'-P-CCNC: 22 U/L (ref 1–33)
AMMONIA BLD-SCNC: 47 UMOL/L (ref 11–51)
ANION GAP SERPL CALCULATED.3IONS-SCNC: 10.1 MMOL/L (ref 5–15)
AST SERPL-CCNC: 41 U/L (ref 1–32)
B PARAPERT DNA SPEC QL NAA+PROBE: NOT DETECTED
B PERT DNA SPEC QL NAA+PROBE: NOT DETECTED
BILIRUB SERPL-MCNC: 2.1 MG/DL (ref 0–1.2)
BUN SERPL-MCNC: 21 MG/DL (ref 8–23)
BUN/CREAT SERPL: 30.9 (ref 7–25)
C PNEUM DNA NPH QL NAA+NON-PROBE: NOT DETECTED
CALCIUM SPEC-SCNC: 8.4 MG/DL (ref 8.6–10.5)
CHLORIDE SERPL-SCNC: 108 MMOL/L (ref 98–107)
CO2 SERPL-SCNC: 21.9 MMOL/L (ref 22–29)
CREAT SERPL-MCNC: 0.68 MG/DL (ref 0.57–1)
D-LACTATE SERPL-SCNC: 3.4 MMOL/L (ref 0.5–2)
EGFRCR SERPLBLD CKD-EPI 2021: 89.8 ML/MIN/1.73
FLUAV SUBTYP SPEC NAA+PROBE: NOT DETECTED
FLUBV RNA ISLT QL NAA+PROBE: NOT DETECTED
GLOBULIN UR ELPH-MCNC: 3.8 GM/DL
GLUCOSE SERPL-MCNC: 142 MG/DL (ref 65–99)
HADV DNA SPEC NAA+PROBE: NOT DETECTED
HCOV 229E RNA SPEC QL NAA+PROBE: NOT DETECTED
HCOV HKU1 RNA SPEC QL NAA+PROBE: NOT DETECTED
HCOV NL63 RNA SPEC QL NAA+PROBE: NOT DETECTED
HCOV OC43 RNA SPEC QL NAA+PROBE: NOT DETECTED
HMPV RNA NPH QL NAA+NON-PROBE: NOT DETECTED
HPIV1 RNA ISLT QL NAA+PROBE: NOT DETECTED
HPIV2 RNA SPEC QL NAA+PROBE: NOT DETECTED
HPIV3 RNA NPH QL NAA+PROBE: NOT DETECTED
HPIV4 P GENE NPH QL NAA+PROBE: NOT DETECTED
M PNEUMO IGG SER IA-ACNC: NOT DETECTED
POTASSIUM SERPL-SCNC: 3.4 MMOL/L (ref 3.5–5.2)
PROT SERPL-MCNC: 6.5 G/DL (ref 6–8.5)
RHINOVIRUS RNA SPEC NAA+PROBE: NOT DETECTED
RSV RNA NPH QL NAA+NON-PROBE: NOT DETECTED
SARS-COV-2 RNA NPH QL NAA+NON-PROBE: NOT DETECTED
SODIUM SERPL-SCNC: 140 MMOL/L (ref 136–145)

## 2024-11-16 PROCEDURE — 96375 TX/PRO/DX INJ NEW DRUG ADDON: CPT

## 2024-11-16 PROCEDURE — 74177 CT ABD & PELVIS W/CONTRAST: CPT

## 2024-11-16 PROCEDURE — 93005 ELECTROCARDIOGRAM TRACING: CPT | Performed by: EMERGENCY MEDICINE

## 2024-11-16 PROCEDURE — 25010000002 DEXAMETHASONE SODIUM PHOSPHATE 10 MG/ML SOLUTION: Performed by: EMERGENCY MEDICINE

## 2024-11-16 PROCEDURE — 82140 ASSAY OF AMMONIA: CPT | Performed by: EMERGENCY MEDICINE

## 2024-11-16 PROCEDURE — 0202U NFCT DS 22 TRGT SARS-COV-2: CPT | Performed by: EMERGENCY MEDICINE

## 2024-11-16 PROCEDURE — 96365 THER/PROPH/DIAG IV INF INIT: CPT

## 2024-11-16 PROCEDURE — 71275 CT ANGIOGRAPHY CHEST: CPT

## 2024-11-16 PROCEDURE — 71275 CT ANGIOGRAPHY CHEST: CPT | Performed by: RADIOLOGY

## 2024-11-16 PROCEDURE — 25510000001 IOPAMIDOL PER 1 ML: Performed by: EMERGENCY MEDICINE

## 2024-11-16 PROCEDURE — 74177 CT ABD & PELVIS W/CONTRAST: CPT | Performed by: RADIOLOGY

## 2024-11-16 PROCEDURE — 25010000002 PIPERACILLIN SOD-TAZOBACTAM PER 1 G: Performed by: EMERGENCY MEDICINE

## 2024-11-16 PROCEDURE — 71045 X-RAY EXAM CHEST 1 VIEW: CPT | Performed by: RADIOLOGY

## 2024-11-16 RX ORDER — IOPAMIDOL 755 MG/ML
100 INJECTION, SOLUTION INTRAVASCULAR
Status: COMPLETED | OUTPATIENT
Start: 2024-11-16 | End: 2024-11-16

## 2024-11-16 RX ORDER — CODEINE PHOSPHATE AND GUAIFENESIN 10; 100 MG/5ML; MG/5ML
5 SOLUTION ORAL ONCE
Status: COMPLETED | OUTPATIENT
Start: 2024-11-16 | End: 2024-11-16

## 2024-11-16 RX ORDER — POTASSIUM CHLORIDE 1.5 G/1.58G
40 POWDER, FOR SOLUTION ORAL ONCE
Status: COMPLETED | OUTPATIENT
Start: 2024-11-16 | End: 2024-11-16

## 2024-11-16 RX ADMIN — PIPERACILLIN AND TAZOBACTAM 3.38 G: 3; .375 INJECTION, POWDER, FOR SOLUTION INTRAVENOUS at 00:29

## 2024-11-16 RX ADMIN — GUAIFENESIN AND CODEINE PHOSPHATE 5 ML: 100; 10 SOLUTION ORAL at 02:56

## 2024-11-16 RX ADMIN — POTASSIUM CHLORIDE 40 MEQ: 1.5 POWDER, FOR SOLUTION ORAL at 02:15

## 2024-11-16 RX ADMIN — IOPAMIDOL 60 ML: 755 INJECTION, SOLUTION INTRAVENOUS at 01:05

## 2024-11-16 RX ADMIN — DEXAMETHASONE SODIUM PHOSPHATE 10 MG: 10 INJECTION INTRAMUSCULAR; INTRAVENOUS at 00:00

## 2024-11-16 NOTE — ED PROVIDER NOTES
Subjective   History of Present Illness  Patient coming from [facility] with hemoptysis.  Patient has had multiple recent visits.  Patient has non-small cell carcinoma of the lung.  Other medical history includes diabetes degenerative joint disease CHF fibromyalgia COPD and cirrhosis.  Patient reports several days of productive cough and today there was shwetha mopped assist.  Patient is not experiencing fever or chills.  She is experiencing some increased wheeze and shortness of breath.  No chest pain.  Patient does have chronic abdominal distention which is somewhat worse today.  No dysuria hematuria frequency.        Review of Systems   Constitutional:  Negative for fever.   HENT:  Negative for trouble swallowing.    Eyes:  Negative for visual disturbance.   Respiratory:  Positive for cough, shortness of breath and wheezing.    Cardiovascular:  Negative for chest pain.   Gastrointestinal:  Positive for abdominal distention. Negative for abdominal pain, blood in stool and diarrhea.   Endocrine: Negative for polydipsia.   Genitourinary:  Negative for dysuria, frequency and hematuria.   Musculoskeletal:  Negative for back pain.   Skin:  Negative for rash.   Neurological:  Negative for speech difficulty.   Hematological:  Does not bruise/bleed easily.   Psychiatric/Behavioral:  Negative for hallucinations and suicidal ideas.        Past Medical History:   Diagnosis Date    Anemia     Arthritis     Asthma     Cancer     squamous cell carinoma lung Left    CHF (congestive heart failure)     Cirrhosis     COPD (chronic obstructive pulmonary disease)     Diabetes mellitus     Disease of thyroid gland     Diverticulitis     Encounter for blood transfusion     Fibromyalgia     Headache     Low back pain     Osteoporosis        Allergies   Allergen Reactions    Ciprofloxacin Nausea Only, Rash and Other (See Comments)     Unknown-flu symptoms    Sertraline Other (See Comments)       Past Surgical History:    Procedure Laterality Date    CHOLECYSTECTOMY  2012    HYSTERECTOMY  2003    PORTACATH PLACEMENT N/A 7/25/2024    Procedure: INSERTION OF PORTACATH;  Surgeon: Austen Mendez MD;  Location: [facility];  Service: General;  Laterality: N/A;    SINUS SURGERY  2006       Family History   Problem Relation Age of Onset    COPD Mother     Hypertension Father     Heart disease Father        Social History     Socioeconomic History    Marital status:    Tobacco Use    Smoking status: Former     Current packs/day: 0.25     Average packs/day: 0.3 packs/day for 10.9 years (2.7 ttl pk-yrs)     Types: Cigarettes     Start date: 2014     Passive exposure: Past    Smokeless tobacco: Never   Vaping Use    Vaping status: Never Used   Substance and Sexual Activity    Alcohol use: No    Drug use: No    Sexual activity: Defer           Objective   Physical Exam  Constitutional:       General: She is not in acute distress.     Appearance: Normal appearance.   HENT:      Head: Normocephalic and atraumatic.      Nose: No rhinorrhea.      Mouth/Throat:      Pharynx: Oropharynx is clear.   Eyes:      Extraocular Movements: Extraocular movements intact.   Cardiovascular:      Rate and Rhythm: Normal rate and regular rhythm.      Heart sounds: Normal heart sounds.   Pulmonary:      Effort: Pulmonary effort is normal.      Breath sounds: Wheezing and rhonchi present.   Abdominal:      General: There is distension.      Palpations: Abdomen is soft.      Tenderness: There is no abdominal tenderness. There is no guarding or rebound.   Musculoskeletal:         General: No swelling or tenderness.      Cervical back: Neck supple. No rigidity.   Neurological:      General: No focal deficit present.      Mental Status: She is alert.      Sensory: No sensory deficit.   Psychiatric:         Behavior: Behavior normal.         Procedures           ED Course  ED Course as of 11/16/24 0711   Sat Nov 16, 2024   0113 EKG performed at 111 shows  sinus rhythm rate of 80 axis within normal limits no acute ischemic changes  Electronically signed by Nia Arriaza MD, 11/16/24, 1:13 AM EST.   [PL]      ED Course User Index  [PL] Nia Arriaza MD                                                       Medical Decision Making  Patient with non-small cell carcinoma presents with hemoptysis.  Not high-volume.  No drop in H&H.  Hemodynamically stable.  No threat to airway.    Patient also complains that her distention related to her cirrhosis and ascites is worse.  Obtaining a CT angiogram chest as well as a CT abdomen pelvis.  Signed out at 1:45 AM to the overnight provider.      2 AM received patient in signout she is pending a CT angio of her chest.   Note lactic acid elevated however this is likely secondary to her cirrhosis and liver disease, family states the patient is otherwise acting herself in fact seems improved from her normal baseline      Diagnosis: Hemoptysis likely secondary to lung cancer known diagnosis  Disposition: Discharged to living facility    Problems Addressed:  Cirrhosis of liver with ascites, unspecified hepatic cirrhosis type: complicated acute illness or injury  Hemoptysis: complicated acute illness or injury  Other ascites: complicated acute illness or injury    Amount and/or Complexity of Data Reviewed  Labs: ordered.  Radiology: ordered.  ECG/medicine tests: ordered.    Risk  OTC drugs.  Prescription drug management.        Final diagnoses:   Hemoptysis   Other ascites   Cirrhosis of liver with ascites, unspecified hepatic cirrhosis type       ED Disposition  ED Disposition       ED Disposition   Discharge    Condition   Stable    Comment   --               Vernon Kaur PA-C  [address] [place] [address]  [phone]    In 3 days           Medication List      No changes were made to your prescriptions during this visit.            Chicho Miller, DO  11/16/24 0711

## 2024-11-17 LAB
BACTERIA SPEC AEROBE CULT: NORMAL
BACTERIA SPEC AEROBE CULT: NORMAL

## 2024-11-18 ENCOUNTER — LAB REQUISITION (OUTPATIENT)
Dept: LAB | Facility: HOSPITAL | Age: 79
End: 2024-11-18
Payer: MEDICARE

## 2024-11-18 DIAGNOSIS — E72.20 DISORDER OF UREA CYCLE METABOLISM, UNSPECIFIED: ICD-10-CM

## 2024-11-18 LAB
AMMONIA BLD-SCNC: 41 UMOL/L (ref 11–51)
QT INTERVAL: 406 MS
QTC INTERVAL: 468 MS

## 2024-11-18 PROCEDURE — 82140 ASSAY OF AMMONIA: CPT | Performed by: INTERNAL MEDICINE

## 2024-11-19 ENCOUNTER — HOSPITAL ENCOUNTER (EMERGENCY)
Facility: HOSPITAL | Age: 79
Discharge: NURSING, REHAB OR LONG-TERM CARE | End: 2024-11-20
Attending: STUDENT IN AN ORGANIZED HEALTH CARE EDUCATION/TRAINING PROGRAM | Admitting: STUDENT IN AN ORGANIZED HEALTH CARE EDUCATION/TRAINING PROGRAM
Payer: MEDICARE

## 2024-11-19 DIAGNOSIS — C34.90 NON-SMALL CELL LUNG CANCER, UNSPECIFIED LATERALITY: ICD-10-CM

## 2024-11-19 DIAGNOSIS — Z51.5 HOSPICE CARE: Primary | ICD-10-CM

## 2024-11-19 PROCEDURE — 99283 EMERGENCY DEPT VISIT LOW MDM: CPT

## 2024-11-19 PROCEDURE — 93005 ELECTROCARDIOGRAM TRACING: CPT | Performed by: STUDENT IN AN ORGANIZED HEALTH CARE EDUCATION/TRAINING PROGRAM

## 2024-11-19 RX ORDER — OLANZAPINE 10 MG/1
10 TABLET, ORALLY DISINTEGRATING ORAL NIGHTLY
Status: DISCONTINUED | OUTPATIENT
Start: 2024-11-19 | End: 2024-11-20 | Stop reason: HOSPADM

## 2024-11-19 RX ORDER — OXYCODONE HYDROCHLORIDE 10 MG/1
5 TABLET ORAL EVERY 4 HOURS PRN
Status: DISCONTINUED | OUTPATIENT
Start: 2024-11-19 | End: 2024-11-20 | Stop reason: HOSPADM

## 2024-11-19 RX ORDER — LACTULOSE 10 G/15ML
30 SOLUTION ORAL ONCE
Status: COMPLETED | OUTPATIENT
Start: 2024-11-19 | End: 2024-11-20

## 2024-11-20 VITALS
SYSTOLIC BLOOD PRESSURE: 95 MMHG | BODY MASS INDEX: 18 KG/M2 | RESPIRATION RATE: 20 BRPM | TEMPERATURE: 97.8 F | OXYGEN SATURATION: 99 % | HEART RATE: 76 BPM | WEIGHT: 112 LBS | DIASTOLIC BLOOD PRESSURE: 53 MMHG | HEIGHT: 66 IN

## 2024-11-20 RX ORDER — LORAZEPAM 2 MG/ML
1 CONCENTRATE ORAL EVERY 4 HOURS PRN
Qty: 30 ML | Refills: 0 | Status: SHIPPED | OUTPATIENT
Start: 2024-11-20 | End: 2024-11-30

## 2024-11-20 RX ORDER — METOPROLOL TARTRATE 25 MG/1
25 TABLET, FILM COATED ORAL 2 TIMES DAILY
Qty: 10 TABLET | Refills: 0 | Status: SHIPPED | OUTPATIENT
Start: 2024-11-20 | End: 2024-11-25

## 2024-11-20 RX ORDER — INSULIN DEGLUDEC 100 U/ML
8 INJECTION, SOLUTION SUBCUTANEOUS NIGHTLY
Qty: 15 ML | Refills: 0 | Status: SHIPPED | OUTPATIENT
Start: 2024-11-20 | End: 2025-05-26

## 2024-11-20 RX ORDER — MONTELUKAST SODIUM 10 MG/1
10 TABLET ORAL NIGHTLY
Qty: 5 TABLET | Refills: 0 | Status: SHIPPED | OUTPATIENT
Start: 2024-11-20 | End: 2024-11-25

## 2024-11-20 RX ORDER — DOXEPIN HYDROCHLORIDE 50 MG/1
50 CAPSULE ORAL NIGHTLY
Qty: 5 CAPSULE | Refills: 0 | Status: SHIPPED | OUTPATIENT
Start: 2024-11-20 | End: 2024-11-25

## 2024-11-20 RX ORDER — BUSPIRONE HYDROCHLORIDE 5 MG/1
5 TABLET ORAL EVERY 12 HOURS SCHEDULED
Qty: 10 TABLET | Refills: 0 | Status: SHIPPED | OUTPATIENT
Start: 2024-11-20 | End: 2024-11-25

## 2024-11-20 RX ORDER — MORPHINE SULFATE 100 MG/5ML
5 SOLUTION ORAL EVERY 4 HOURS PRN
Qty: 30 ML | Refills: 0 | Status: SHIPPED | OUTPATIENT
Start: 2024-11-20 | End: 2024-12-10

## 2024-11-20 RX ORDER — PANTOPRAZOLE SODIUM 40 MG/1
40 TABLET, DELAYED RELEASE ORAL DAILY
Qty: 5 TABLET | Refills: 0 | Status: SHIPPED | OUTPATIENT
Start: 2024-11-20 | End: 2024-11-25

## 2024-11-20 RX ORDER — LEVOTHYROXINE SODIUM 75 UG/1
75 TABLET ORAL DAILY
Qty: 5 TABLET | Refills: 0 | Status: SHIPPED | OUTPATIENT
Start: 2024-11-20 | End: 2024-11-25

## 2024-11-20 RX ORDER — TRAMADOL HYDROCHLORIDE 50 MG/1
50 TABLET ORAL DAILY PRN
Qty: 5 TABLET | Refills: 0 | Status: SHIPPED | OUTPATIENT
Start: 2024-11-20 | End: 2024-11-25

## 2024-11-20 RX ADMIN — OLANZAPINE 10 MG: 10 TABLET, ORALLY DISINTEGRATING ORAL at 00:03

## 2024-11-20 RX ADMIN — LACTULOSE 30 G: 20 SOLUTION ORAL at 00:03

## 2024-11-20 NOTE — ED NOTES
Called [facility] to give report and they state they will have the on call nurse call back. The number for them is [phone]

## 2024-11-20 NOTE — ED NOTES
Called [facility] and [phone] to give report. They state they will have the on call nurse call back.

## 2024-11-20 NOTE — CASE MANAGEMENT/SOCIAL WORK
Case Management/Social Work    Patient Name:  Conchita Ross  YOB: 1945  MRN: [id number]  Admit Date:  11/19/2024     request SS to follow up with  at [facility]. SS attempted contact with  at [facility] and message was sent. SS contacted [facility] per Mally. Mally will discuss pt with  and contact SS back. SS to follow.     Addendum @ 9:45: SS spoke to .  spoke to  at [facility] and they will accept pt back with hospice services. SS received call from [facility] per Mally. Mally states son notified her that he plans to take pt home and has requested them to pack pt's belongings. SS plans to visit son upon his arrival. SS to follow.     Addendum @ 11:49: SS visited son who is at pt's bedside. Son voices pt was discharged by [facility] and he picked her belongings up this am. Son does not want pt to return to [facility] and [facility] even if they would accept her back. Son voices agreement to take pt home with home hospice if another nursing home placement cannot be arranged. [facility] was arranged yesterday. Son has no preference of nursing home facility. SS to follow.    Electronically signed by:  MARJAN Moeller  11/20/24 09:18 EST

## 2024-11-20 NOTE — CASE MANAGEMENT/SOCIAL WORK
Case Management/Social Work    Patient Name:  Conchita Ross  YOB: 1945  MRN: [id number]  Admit Date:  11/19/2024        Pt's son/Guardian Andrés is agreeable to long term care placement at nursing home with [facility] hospice following. Son has no preference of nursing home facility. SS contacted [facility] per Kristyn and faxed referral to fax [phone] for review. SS to follow.     12:06pm: SS notified by [facility] per Simran that facility does not have any long term care/hospice beds available at this time. SS contacted [facility] per Deidra and left a message requesting a return call about possible bed availability. SS to follow.     13:16pm: SS notified by [facility] per Kristyn that facility will need additional medical records before making decision about possible admission. SS contacted [facility] per Raman who has provided additional medical records. SS faxed records to [facility] fax [phone] for review. SS to follow.     16:55pm: Pt's son is agreeable to take Pt home to [address] [place] [address] [address] [place] [id number] while SS continues to work on NHP from home. SS notified RN that [facility] will need contacted at [phone]. Pt will need meds to beds arranged for her discharge medications. Pt will need EMS transport. Son will need notified when EMS is on the way [phone].  updated Lead RN, RN and Provider.     17:13pm: SS spoke with [facility] RN Randa who has requested at least a 5 day supply of Pt's medications due to hospice agency mail ordering their medications. SS updated Lead RN, RN and Provider.       Electronically signed by:  ESAU Alvarado  11/20/24 11:54 EST

## 2024-11-20 NOTE — ED PROVIDER NOTES
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  Patient is awake, alert to self, place, year but not president.  She says \"every time I get in trouble they sent me here \".  She denies any acute complaints.      Review of Systems   Constitutional:  Negative for chills, fatigue and fever.   HENT:  Negative for ear pain, sinus pain and sore throat.    Respiratory:  Negative for cough, chest tightness, shortness of breath and wheezing.    Cardiovascular:  Negative for chest pain, palpitations and leg swelling.   Gastrointestinal:  Negative for abdominal pain, constipation, diarrhea, nausea and vomiting.   Genitourinary:  Negative for dysuria, hematuria and urgency.   Musculoskeletal:  Negative for arthralgias and myalgias.   Neurological:  Negative for dizziness, syncope and light-headedness.   Psychiatric/Behavioral:  Negative for confusion.        Past Medical History:   Diagnosis Date    Anemia     Arthritis     Asthma     Cancer     squamous cell carinoma lung Left    CHF (congestive heart failure)     Cirrhosis     COPD (chronic obstructive pulmonary disease)     Diabetes mellitus     Disease of thyroid gland     Diverticulitis     Encounter for blood transfusion     Fibromyalgia     Headache     Low back pain     Osteoporosis        Allergies   Allergen Reactions    Ciprofloxacin Nausea Only, Rash and Other (See Comments)     Unknown-flu symptoms    Sertraline Other (See Comments)       Past Surgical History:   Procedure Laterality Date    CHOLECYSTECTOMY  2012    HYSTERECTOMY  2003    PORTACATH PLACEMENT N/A 7/25/2024    Procedure: INSERTION OF PORTACATH;  Surgeon: Austen Mendez MD;  Location: [facility];  Service: General;  Laterality: N/A;    SINUS SURGERY  2006       Family " History   Problem Relation Age of Onset    COPD Mother     Hypertension Father     Heart disease Father        Social History     Socioeconomic History    Marital status:    Tobacco Use    Smoking status: Former     Current packs/day: 0.25     Average packs/day: 0.3 packs/day for 10.9 years (2.7 ttl pk-yrs)     Types: Cigarettes     Start date: 2014     Passive exposure: Past    Smokeless tobacco: Never   Vaping Use    Vaping status: Never Used   Substance and Sexual Activity    Alcohol use: No    Drug use: No    Sexual activity: Defer           Objective   Physical Exam  Vitals and nursing note reviewed.   Constitutional:       Appearance: She is well-developed and normal weight.   HENT:      Head: Normocephalic and atraumatic.      Mouth/Throat:      Mouth: Mucous membranes are moist.      Pharynx: Oropharynx is clear.   Eyes:      Extraocular Movements: Extraocular movements intact.      Pupils: Pupils are equal, round, and reactive to light.   Cardiovascular:      Rate and Rhythm: Normal rate and regular rhythm.      Heart sounds: Normal heart sounds.   Pulmonary:      Effort: Pulmonary effort is normal.      Breath sounds: Normal breath sounds.   Abdominal:      General: There is distension.      Palpations: Abdomen is soft.      Tenderness: There is no abdominal tenderness.   Musculoskeletal:         General: Normal range of motion.      Cervical back: Normal range of motion and neck supple.   Skin:     General: Skin is warm and dry.      Capillary Refill: Capillary refill takes less than 2 seconds.   Neurological:      Mental Status: She is alert. Mental status is at baseline.   Psychiatric:         Mood and Affect: Mood normal.         Behavior: Behavior normal.         Procedures           ED Course  ED Course as of 11/25/24 0808   Wed Nov 20, 2024   0132 Discussed with case management, they will evaluate in a.m. []   1446 Waiting for case management to place at SNF or find discharge placement.  [MB]      ED Course User Index  [CW] Norberto Shepherd DO  [MB] Maxine De La Vega APRN                                                       Medical Decision Making  --Patient is hemodynamically stable.  I spoke to her son at length.  He told me that he had until 2 PM tomorrow to pick the patient up as she was being discharged from the nursing home as they could no longer care for her.  He said he signed her up for hospice today.  I discussed treatment and plan of care, he is agreeable to no labs or imaging.  I will give her lactulose and as needed pain meds if needed.  --Her son sounds exhausted and frustrated with this whole process as patient has been sent to the ER 4-5 times in the past 2 to 3 weeks.  I discussed with case management.   will evaluate in the morning, we need to sort out treatment plan at the nursing facility, meds so she quits being sent to the emergency department.  Her son does not want her sent, she does not want to be sent-she stated that today  --Ultimate plan would be to send back with hospice to the nursing facility    Amount and/or Complexity of Data Reviewed  ECG/medicine tests: ordered.    Risk  Prescription drug management.        Final diagnoses:   Hospice care       ED Disposition  ED Disposition       ED Disposition   Discharge    Condition   Stable    Comment   --               Vernon Kaur PA-C  [address] [place] [id number] [phone]    Call in 2 days           Medication List        Stop      cefdinir 300 MG capsule  Commonly known as: OMNICEF               Where to Get Your Medications        These medications were sent to [address] [address]      Hours: Monday to Friday 7 AM to 6 PM Phone: [phone]   busPIRone 5 MG tablet  doxepin 50 MG capsule  levothyroxine 75 MCG tablet  LORazepam 2 MG/ML concentrated solution  metoprolol tartrate 25 MG tablet  montelukast 10 MG tablet  morphine  20 MG/ML concentrated solution 20mg/ml  pantoprazole 40 MG EC tablet  traMADol 50 MG tablet  Tresiba FlexTouch 100 UNIT/ML solution pen-injector injection            Norberto Shepherd,   11/20/24 0212       Norberto Shepherd,   11/25/24 0899

## 2024-11-20 NOTE — DISCHARGE PLACEMENT REQUEST
"Conchita Ross (78 y.o. Female)       Date of Birth   1945    Social Security Number       Address   [address] [place] [address]    Home Phone   [phone]    MRN   [id number]       [facility]    Marital Status                               Admission Date   11/19/24    Admission Type   Emergency    Admitting Provider       Attending Provider   Nia Arriaza MD    Department, Room/Bed   [facility] EMERGENCY DEPARTMENT, 102/02       Discharge Date       Discharge Disposition       Discharge Destination                                 Attending Provider: Nia Arriaza MD    Allergies: Ciprofloxacin, Sertraline    Isolation: None   Infection: None   Code Status: Prior    Ht: 167.6 cm (66\")   Wt: 50.8 kg (112 lb)    Admission Cmt: None   Principal Problem: None                  Active Insurance as of 11/19/2024       Primary Coverage       Payor Plan Insurance Group Employer/Plan Group    ANTHEM MEDICARE REPLACEMENT ANTHEM MEDICARE ADVANTAGE [id number]       Payor Plan Address Payor Plan Phone Number Payor Plan Fax Number Effective Dates    [address] [phone]  1/1/2024 - None Entered    [facility] [id number]         Subscriber Name Subscriber Birth Date Member ID       CONCHITA ROSS 1945 [id number]               Secondary Coverage       Payor Plan Insurance Group Employer/Plan Group    [employer] [place] WE[place]ARE MEDICAID        Payor Plan Address Payor Plan Phone Number Payor Plan Fax Number Effective Dates    [address] [address] [phone]  6/20/2016 - None Entered    [facility] [id number]         Subscriber Name Subscriber Birth Date Member ID       CONCHITA ROSS 1945 [id number]                     Emergency Contacts        (Rel.) Home Phone Work Phone Mobile Phone    BENNETT ROSS (GUARDIAN) (Son) -- -- [phone]    IRA ROSS (Relative) -- -- [phone]              History & Physical    No notes of this type exist " for this encounter.       Vital Signs (last day)       Date/Time Temp Temp src Pulse Resp BP Patient Position SpO2    11/20/24 0600 -- -- 80 -- 115/91 -- 100    11/20/24 0545 -- -- 74 -- 114/68 -- 95    11/20/24 0530 -- -- 64 -- 114/78 -- 100    11/20/24 0515 -- -- 82 -- 109/63 -- 99    11/20/24 0500 -- -- 77 -- 124/63 -- 98    11/20/24 0315 -- -- 76 -- 93/55 -- 96    11/20/24 0245 -- -- 70 -- 105/53 -- 97    11/20/24 0230 -- -- 72 -- 97/60 -- 97    11/20/24 0145 -- -- 70 -- 90/56 -- 99    11/20/24 0130 -- -- 70 -- 95/54 -- 99    11/20/24 0115 -- -- 73 -- 95/67 -- 100    11/20/24 0045 -- -- 70 -- 94/59 -- 100    11/20/24 0030 -- -- 70 -- 104/63 -- 100    11/20/24 0015 -- -- 64 -- 113/63 -- 100    11/20/24 0000 -- -- 68 -- 110/62 -- 100    11/19/24 2345 -- -- 90 -- 112/68 -- 100    11/19/24 2330 -- -- 68 -- 121/69 -- 100    11/19/24 2315 -- -- 71 -- 120/70 -- 100    11/19/24 2300 -- -- 71 -- 120/71 -- 100    11/19/24 2255 97.8 (36.6) Oral 75 20 129/77 Sitting 100          Lines, Drains & Airways       Active LDAs       Name Placement date Placement time Site Days    Single Lumen Implantable Port 07/25/24 Right Subclavian 07/25/24  1142  Subclavian  118                  Current Facility-Administered Medications   Medication Dose Route Frequency Provider Last Rate Last Admin    OLANZapine zydis (ZyPREXA) disintegrating tablet 10 mg  10 mg Oral Nightly Norberto Shepherd DO   10 mg at 11/20/24 0003    oxyCODONE (ROXICODONE) immediate release tablet 5 mg  5 mg Oral Q4H PRN Norberto Shepherd, DO         Current Outpatient Medications   Medication Sig Dispense Refill    albuterol (PROVENTIL) (2.5 MG/3ML) 0.083% nebulizer solution Take 2.5 mg by nebulization Every 4 (Four) Hours As Needed for Wheezing.      albuterol sulfate  (90 Base) MCG/ACT inhaler Inhale 2 puffs Every 4 (Four) Hours As Needed for Wheezing or Shortness of Air.      Budeson-Glycopyrrol-Formoterol (Breztri Aerosphere) 160-9-4.8 MCG/ACT  aerosol inhaler Inhale 2 puffs 2 (Two) Times a Day.      busPIRone (BUSPAR) 5 MG tablet Take 1 tablet by mouth Every 12 (Twelve) Hours. Indications: Anxiety Disorder 60 tablet 0    doxepin (SINEquan) 50 MG capsule Take 1 capsule by mouth Every Night.      guaiFENesin-codeine (GUAIFENESIN AC) 100-10 MG/5ML liquid Take 5 mL by mouth 4 (Four) Times a Day As Needed for Cough. 280 mL 0    insulin degludec (Tresiba FlexTouch) 100 UNIT/ML solution pen-injector injection Inject 8 Units under the skin into the appropriate area as directed Every Night.      lactulose (CHRONULAC) 10 GM/15ML solution Take 30 mL by mouth 3 (Three) Times a Day.      levocetirizine (XYZAL) 5 MG tablet Take 1 tablet by mouth Every Night.      levothyroxine (SYNTHROID, LEVOTHROID) 75 MCG tablet Take 1 tablet by mouth Daily.      lidocaine-prilocaine (EMLA) 2.5-2.5 % cream Apply to port-a-cath site 30 minutes prior to arrival at infusion center. Cover with plastic wrap. 30 g 1    LORazepam (LORazepam Intensol) 2 MG/ML concentrated solution Take 0.5 mL by mouth Every 4 (Four) Hours As Needed (anxiety, restlessness, nausea). 30 mL 0    metoprolol tartrate (LOPRESSOR) 25 MG tablet Take 1 tablet by mouth 2 (Two) Times a Day. Hold for heart rate less than 60 or blood pressure less than 100/60 60 tablet 0    montelukast (SINGULAIR) 10 MG tablet Take 1 tablet by mouth Every Night.      morphine 20 MG/ML concentrated solution Take 0.25 mL by mouth Every 4 (Four) Hours As Needed (pain, restlessness, difficulty breathing). 30 mL 0    pantoprazole (PROTONIX) 40 MG EC tablet Take 1 tablet by mouth Daily.      traMADol (ULTRAM) 50 MG tablet Take 1 tablet by mouth Daily As Needed for Moderate Pain or Severe Pain.       Operative/Procedure Notes (most recent note)    No notes of this type exist for this encounter.       Physician Progress Notes (most recent note)    No notes of this type exist for this encounter.       Consult Notes (most recent note)    No notes  of this type exist for this encounter.

## 2024-11-21 ENCOUNTER — TELEPHONE (OUTPATIENT)
Dept: SOCIAL WORK | Facility: HOSPITAL | Age: 79
End: 2024-11-21
Payer: MEDICARE

## 2024-11-21 LAB
BACTERIA SPEC AEROBE CULT: NORMAL
BACTERIA SPEC AEROBE CULT: NORMAL
QT INTERVAL: 402 MS
QTC INTERVAL: 452 MS

## 2024-11-21 NOTE — TELEPHONE ENCOUNTER
SS notified by [facility] that facility has denied acceptance. SS contacted [facility] per Deidra who states she does not have any long term care nursing homes available. [facility] to place referral on waiting list. SS contacted Guardian Andrés and made him aware of denials. SS offered to fax referral to [facility]. Guardian states he does not think Mrs. Ross will agree to go to a nursing home from home that [facility] is supposed to be to home today.

## 2024-11-21 NOTE — ED NOTES
Patient was sent home with medications (Montelukast).  Kayley PUENTE RN called son to inform him that the patient is on her way home.

## (undated) DEVICE — DRAPE,UTILTY,TAPE,15X26, 4EA/PK: Brand: MEDLINE

## (undated) DEVICE — SUT VIC 3/0 SH 27IN J416H

## (undated) DEVICE — DRP C/ARM W/BAND W/CLIPS 41X74IN

## (undated) DEVICE — DRAPE,T,LAPARO,TRANS,STERILE: Brand: MEDLINE

## (undated) DEVICE — INTENDED FOR TISSUE SEPARATION, AND OTHER PROCEDURES THAT REQUIRE A SHARP SURGICAL BLADE TO PUNCTURE OR CUT.: Brand: BARD-PARKER ® CARBON RIB-BACK BLADES

## (undated) DEVICE — NDL HYPO ECLPS SFTY 18G 1 1/2IN

## (undated) DEVICE — GLV SURG PREMIERPRO MIC LTX PF SZ7.5 BRN

## (undated) DEVICE — SUT MNCRYL PLS ANTIB UD 4/0 PS2 18IN

## (undated) DEVICE — SYR LUERLOK 30CC

## (undated) DEVICE — PK BASIC 70

## (undated) DEVICE — PATIENT RETURN ELECTRODE, SINGLE-USE, CONTACT QUALITY MONITORING, ADULT, WITH 9FT CORD, FOR PATIENTS WEIGING OVER 33LBS. (15KG): Brand: MEGADYNE

## (undated) DEVICE — PENCL SMOKE/EVAC MEGADYNE TELESCP 10FT

## (undated) DEVICE — HYPODERMIC SAFETY NEEDLE: Brand: MONOJECT

## (undated) DEVICE — ELECTRD BLD EZ CLN MOD XLNG 2.75IN

## (undated) DEVICE — HOLDER: Brand: DEROYAL

## (undated) DEVICE — UNDERGLV SURG BIOGEL INDICAT PF 8 GRN

## (undated) DEVICE — SUT PROLN 3/0 8832H

## (undated) DEVICE — CVR PROB ULTRASND CIVFLEX GEN/PURP TELESCP/FOLD 5.5X58IN LF